# Patient Record
Sex: FEMALE | Race: WHITE | NOT HISPANIC OR LATINO | Employment: FULL TIME | URBAN - NONMETROPOLITAN AREA
[De-identification: names, ages, dates, MRNs, and addresses within clinical notes are randomized per-mention and may not be internally consistent; named-entity substitution may affect disease eponyms.]

---

## 2018-04-03 ENCOUNTER — INITIAL PRENATAL (OUTPATIENT)
Dept: OBSTETRICS AND GYNECOLOGY | Facility: CLINIC | Age: 28
End: 2018-04-03

## 2018-04-03 ENCOUNTER — APPOINTMENT (OUTPATIENT)
Dept: LAB | Facility: HOSPITAL | Age: 28
End: 2018-04-03

## 2018-04-03 VITALS
BODY MASS INDEX: 31.98 KG/M2 | HEIGHT: 68 IN | WEIGHT: 211 LBS | SYSTOLIC BLOOD PRESSURE: 100 MMHG | DIASTOLIC BLOOD PRESSURE: 72 MMHG

## 2018-04-03 DIAGNOSIS — O34.219 MATERNAL CARE FOR SCAR FROM PREVIOUS CESAREAN DELIVERY, UNSPECIFIED PRIOR CESAREAN DELIVERY TYPE: Primary | ICD-10-CM

## 2018-04-03 DIAGNOSIS — Z3A.00 WEEKS OF GESTATION OF PREGNANCY NOT SPECIFIED: ICD-10-CM

## 2018-04-03 LAB
ABO GROUP BLD: NORMAL
AMPHET+METHAMPHET UR QL: NEGATIVE
BARBITURATES UR QL SCN: NEGATIVE
BASOPHILS # BLD AUTO: 0.02 10*3/MM3 (ref 0–0.2)
BASOPHILS NFR BLD AUTO: 0.2 % (ref 0–2)
BENZODIAZ UR QL SCN: NEGATIVE
BILIRUB UR QL STRIP: NEGATIVE
BLD GP AB SCN SERPL QL: NEGATIVE
CANDIDA ALBICANS: NEGATIVE
CANNABINOIDS SERPL QL: NEGATIVE
CLARITY UR: ABNORMAL
COCAINE UR QL: NEGATIVE
COLOR UR: YELLOW
DEPRECATED RDW RBC AUTO: 47.3 FL (ref 36.4–46.3)
EOSINOPHIL # BLD AUTO: 0.1 10*3/MM3 (ref 0–0.7)
EOSINOPHIL NFR BLD AUTO: 0.8 % (ref 0–7)
ERYTHROCYTE [DISTWIDTH] IN BLOOD BY AUTOMATED COUNT: 14.5 % (ref 11.5–14.5)
GARDNERELLA VAGINALIS: POSITIVE
GLUCOSE UR STRIP-MCNC: NEGATIVE MG/DL
HCT VFR BLD AUTO: 36.6 % (ref 35–45)
HGB BLD-MCNC: 12.6 G/DL (ref 12–15.5)
HGB UR QL STRIP.AUTO: NEGATIVE
IMM GRANULOCYTES # BLD: 0.04 10*3/MM3 (ref 0–0.02)
IMM GRANULOCYTES NFR BLD: 0.3 % (ref 0–0.5)
KETONES UR QL STRIP: NEGATIVE
LEUKOCYTE ESTERASE UR QL STRIP.AUTO: ABNORMAL
LYMPHOCYTES # BLD AUTO: 3.08 10*3/MM3 (ref 0.6–4.2)
LYMPHOCYTES NFR BLD AUTO: 26.1 % (ref 10–50)
Lab: NORMAL
MCH RBC QN AUTO: 30.5 PG (ref 26.5–34)
MCHC RBC AUTO-ENTMCNC: 34.4 G/DL (ref 31.4–36)
MCV RBC AUTO: 88.6 FL (ref 80–98)
METHADONE UR QL SCN: NEGATIVE
MONOCYTES # BLD AUTO: 0.77 10*3/MM3 (ref 0–0.9)
MONOCYTES NFR BLD AUTO: 6.5 % (ref 0–12)
NEUTROPHILS # BLD AUTO: 7.77 10*3/MM3 (ref 2–8.6)
NEUTROPHILS NFR BLD AUTO: 66.1 % (ref 37–80)
NITRITE UR QL STRIP: NEGATIVE
OPIATES UR QL: NEGATIVE
OXYCODONE UR QL SCN: NEGATIVE
PH UR STRIP.AUTO: 6 [PH] (ref 5–9)
PLATELET # BLD AUTO: 295 10*3/MM3 (ref 150–450)
PMV BLD AUTO: 11.4 FL (ref 8–12)
PROT UR QL STRIP: NEGATIVE
RBC # BLD AUTO: 4.13 10*6/MM3 (ref 3.77–5.16)
RH BLD: POSITIVE
SP GR UR STRIP: 1.02 (ref 1–1.03)
TRICHOMONAS VAGINALIS PCR: NEGATIVE
UROBILINOGEN UR QL STRIP: ABNORMAL
WBC NRBC COR # BLD: 11.78 10*3/MM3 (ref 3.2–9.8)

## 2018-04-03 PROCEDURE — 82677 ASSAY OF ESTRIOL: CPT | Performed by: OBSTETRICS & GYNECOLOGY

## 2018-04-03 PROCEDURE — 86336 INHIBIN A: CPT | Performed by: OBSTETRICS & GYNECOLOGY

## 2018-04-03 PROCEDURE — 86592 SYPHILIS TEST NON-TREP QUAL: CPT | Performed by: OBSTETRICS & GYNECOLOGY

## 2018-04-03 PROCEDURE — 86901 BLOOD TYPING SEROLOGIC RH(D): CPT | Performed by: OBSTETRICS & GYNECOLOGY

## 2018-04-03 PROCEDURE — 99202 OFFICE O/P NEW SF 15 MIN: CPT | Performed by: OBSTETRICS & GYNECOLOGY

## 2018-04-03 PROCEDURE — 80307 DRUG TEST PRSMV CHEM ANLYZR: CPT | Performed by: OBSTETRICS & GYNECOLOGY

## 2018-04-03 PROCEDURE — 86900 BLOOD TYPING SEROLOGIC ABO: CPT | Performed by: OBSTETRICS & GYNECOLOGY

## 2018-04-03 PROCEDURE — 81003 URINALYSIS AUTO W/O SCOPE: CPT | Performed by: OBSTETRICS & GYNECOLOGY

## 2018-04-03 PROCEDURE — 87086 URINE CULTURE/COLONY COUNT: CPT | Performed by: OBSTETRICS & GYNECOLOGY

## 2018-04-03 PROCEDURE — 85025 COMPLETE CBC W/AUTO DIFF WBC: CPT | Performed by: OBSTETRICS & GYNECOLOGY

## 2018-04-03 PROCEDURE — 87186 SC STD MICRODIL/AGAR DIL: CPT | Performed by: OBSTETRICS & GYNECOLOGY

## 2018-04-03 PROCEDURE — 87491 CHLMYD TRACH DNA AMP PROBE: CPT | Performed by: OBSTETRICS & GYNECOLOGY

## 2018-04-03 PROCEDURE — 82105 ALPHA-FETOPROTEIN SERUM: CPT | Performed by: OBSTETRICS & GYNECOLOGY

## 2018-04-03 PROCEDURE — 86850 RBC ANTIBODY SCREEN: CPT | Performed by: OBSTETRICS & GYNECOLOGY

## 2018-04-03 PROCEDURE — 87591 N.GONORRHOEAE DNA AMP PROB: CPT | Performed by: OBSTETRICS & GYNECOLOGY

## 2018-04-03 PROCEDURE — 87340 HEPATITIS B SURFACE AG IA: CPT | Performed by: OBSTETRICS & GYNECOLOGY

## 2018-04-03 PROCEDURE — 87077 CULTURE AEROBIC IDENTIFY: CPT | Performed by: OBSTETRICS & GYNECOLOGY

## 2018-04-03 PROCEDURE — 36415 COLL VENOUS BLD VENIPUNCTURE: CPT | Performed by: OBSTETRICS & GYNECOLOGY

## 2018-04-03 PROCEDURE — 84702 CHORIONIC GONADOTROPIN TEST: CPT | Performed by: OBSTETRICS & GYNECOLOGY

## 2018-04-03 PROCEDURE — G0123 SCREEN CERV/VAG THIN LAYER: HCPCS | Performed by: OBSTETRICS & GYNECOLOGY

## 2018-04-03 PROCEDURE — 87661 TRICHOMONAS VAGINALIS AMPLIF: CPT | Performed by: OBSTETRICS & GYNECOLOGY

## 2018-04-03 PROCEDURE — 87660 TRICHOMONAS VAGIN DIR PROBE: CPT | Performed by: OBSTETRICS & GYNECOLOGY

## 2018-04-03 PROCEDURE — 87480 CANDIDA DNA DIR PROBE: CPT | Performed by: OBSTETRICS & GYNECOLOGY

## 2018-04-03 PROCEDURE — 86762 RUBELLA ANTIBODY: CPT | Performed by: OBSTETRICS & GYNECOLOGY

## 2018-04-03 PROCEDURE — 86803 HEPATITIS C AB TEST: CPT | Performed by: OBSTETRICS & GYNECOLOGY

## 2018-04-03 PROCEDURE — G0432 EIA HIV-1/HIV-2 SCREEN: HCPCS | Performed by: OBSTETRICS & GYNECOLOGY

## 2018-04-03 PROCEDURE — 87510 GARDNER VAG DNA DIR PROBE: CPT | Performed by: OBSTETRICS & GYNECOLOGY

## 2018-04-03 RX ORDER — ONDANSETRON 8 MG/1
8 TABLET, ORALLY DISINTEGRATING ORAL EVERY 8 HOURS PRN
COMMUNITY
End: 2022-12-01

## 2018-04-03 RX ORDER — PRENATAL VIT/IRON FUM/FOLIC AC 27MG-0.8MG
TABLET ORAL DAILY
COMMUNITY
End: 2022-12-01

## 2018-04-03 RX ORDER — OMEPRAZOLE 20 MG/1
20 CAPSULE, DELAYED RELEASE ORAL DAILY
COMMUNITY
End: 2022-12-01

## 2018-04-03 NOTE — PROGRESS NOTES
Chief Complaint   Patient presents with   • Initial Prenatal Visit       Kath Uribe is a 27 y.o. year old .  Patient's last menstrual period was 2017 (within days).  She presents to be seen to initiate prenatal care.    Smoking status: Current Every Day Smoker                                                   Packs/day: 0.00      Years: 0.00         Types: Electronic Cigarette  Smokeless tobacco: Never Used                          The following portions of the patient's history were reviewed and updated as appropriate:vital signs, allergies, current medications, past medical history, past social history, past surgical history and problem list.    Lab Review   No data reviewed    Imaging   No data reviewed    Assessment/Plan   ASSESSMENT  1. IUP at Unknown  Kath was seen today for initial prenatal visit.    Diagnoses and all orders for this visit:    Maternal care for scar from previous  delivery, unspecified prior  delivery type  -     US Ob 14 + Weeks Single or First Gestation; Future  -     OB Panel With HIV  -     Urine Drug Screen - Urine, Clean Catch  -     Urine Culture - Urine, Urine, Clean Catch  -     Urinalysis - Urine, Clean Catch  -     Gardnerella vaginalis, Trichomonas vaginalis, Candida albicans, PCR - Swab, Vagina  -     Chlamydia trachomatis, Neisseria gonorrhoeae, Trichomonas vaginalis, PCR - Swab, Vagina  -     Liquid-based Pap Smear, Screening  -     Maternal Screen 4  -     RPR  -     CBC Auto Differential  -     Hepatitis B Surface Antigen  -     Rubella Antibody, IgG  -     OB Panel Type & Screen  -     HIV-1 & HIV-2 Antibodies  -     Hepatitis C Antibody    Weeks of gestation of pregnancy not specified    2.     PLAN  1. Tests ordered today:  Orders Placed This Encounter   Procedures   • Urine Culture - Urine, Urine, Clean Catch   • Gardnerella vaginalis, Trichomonas vaginalis, Candida albicans, PCR - Swab, Vagina   • Chlamydia trachomatis, Neisseria  gonorrhoeae, Trichomonas vaginalis, PCR - Swab, Vagina   • US Ob 14 + Weeks Single or First Gestation     Standing Status:   Future     Standing Expiration Date:   4/3/2019     Order Specific Question:   Reason for Exam:     Answer:   Anatomy z36   • OB Panel With HIV   • Urine Drug Screen - Urine, Clean Catch   • Urinalysis - Urine, Clean Catch   • Maternal Screen 4     , edc 9/4/18     Order Specific Question:   [facility] Is egg from donor?     Answer:   No     Order Specific Question:   [facility] Date the gestational age was calculated:     Answer:   4/3/2018     Order Specific Question:   [facility] Gestational age of fetus (weeks):     Answer:   18     Order Specific Question:   [facility] Is patient insulin-dependent     Answer:   No     Order Specific Question:   [facility] Number of fetuses:     Answer:   1     Order Specific Question:   [facility] Reason for screen:     Answer:   OTHER     Order Specific Question:   [facility] Gestational age calculation method:     Answer:   ULTRASOUND     Order Specific Question:   [facility] Patient weight (lbs):     Answer:   211   • RPR   • CBC Auto Differential   • Hepatitis B Surface Antigen   • Rubella Antibody, IgG   • HIV-1 & HIV-2 Antibodies   • Hepatitis C Antibody   • OB Panel Type & Screen     2. Medications prescribed today:  New Medications Ordered This Visit   Medications   • omeprazole (priLOSEC) 20 MG capsule     Sig: Take 20 mg by mouth Daily.   • Prenatal Vit-Fe Fumarate-FA (PRENATAL VITAMIN 27-0.8) 27-0.8 MG tablet tablet     Sig: Take  by mouth Daily.   • ondansetron 24 mg in sodium chloride 0.9 % 50 mL IVPB     Sig: Infuse 24 mg into a venous catheter 1 (One) Time.   • ondansetron ODT (ZOFRAN-ODT) 8 MG disintegrating tablet     Sig: Take 8 mg by mouth Every 8 (Eight) Hours As Needed for Nausea or Vomiting.       Follow up: 2 week(s)       This note was electronically signed.    Adriano Long MD  April 3, 2018

## 2018-04-04 LAB
C TRACH RRNA CVX QL NAA+PROBE: NEGATIVE
HBV SURFACE AG SERPL QL IA: NEGATIVE
HCV AB SER DONR QL: NEGATIVE
HIV1+2 AB SER QL: NEGATIVE
N GONORRHOEA RRNA SPEC QL NAA+PROBE: NEGATIVE
RUBV IGG SER QL: ABNORMAL
RUBV IGG SER-ACNC: 23 IU/ML (ref 0–9.9)
T VAGINALIS DNA VAG QL PROBE+SIG AMP: NEGATIVE

## 2018-04-05 LAB
2ND TRIMESTER 4 SCREEN SERPL-IMP: NORMAL
2ND TRIMESTER 4 SCREEN SERPL-IMP: NORMAL
AFP ADJ MOM SERPL: 0.72
AFP SERPL-MCNC: 22.1 NG/ML
AGE AT DELIVERY: 26.2 YEARS
BACTERIA SPEC AEROBE CULT: ABNORMAL
BACTERIA SPEC AEROBE CULT: ABNORMAL
FET TS 18 RISK FROM MAT AGE: NORMAL
FET TS 21 RISK FROM MAT AGE: 971
GA METHOD: NORMAL
GA: 17.1 WEEKS
HCG ADJ MOM SERPL: 1.27
HCG SERPL-ACNC: NORMAL MIU/ML
IDDM PATIENT QL: NO
INHIBIN A ADJ MOM SERPL: 1.16
INHIBIN A SERPL-MCNC: 157.36 PG/ML
LAB AP CASE REPORT: NORMAL
LAB AP GYN ADDITIONAL INFORMATION: NORMAL
LAB AP GYN OTHER FINDINGS: NORMAL
LABORATORY COMMENT REPORT: NORMAL
Lab: NORMAL
MULTIPLE PREGNANCY: NO
NEURAL TUBE DEFECT RISK FETUS: NORMAL %
PATH INTERP SPEC-IMP: NORMAL
RESULT: NORMAL
RPR SER QL: NORMAL
STAT OF ADQ CVX/VAG CYTO-IMP: NORMAL
TS 18 RISK FETUS: NORMAL
TS 21 RISK FETUS: 2405
U ESTRIOL ADJ MOM SERPL: 1.55
U ESTRIOL SERPL-MCNC: 1.45 NG/ML

## 2022-10-20 ENCOUNTER — TRANSCRIBE ORDERS (OUTPATIENT)
Dept: PHYSICAL THERAPY | Facility: CLINIC | Age: 32
End: 2022-10-20

## 2022-10-20 DIAGNOSIS — M54.50 LOW BACK PAIN, UNSPECIFIED BACK PAIN LATERALITY, UNSPECIFIED CHRONICITY, UNSPECIFIED WHETHER SCIATICA PRESENT: Primary | ICD-10-CM

## 2022-10-24 ENCOUNTER — TREATMENT (OUTPATIENT)
Dept: PHYSICAL THERAPY | Facility: CLINIC | Age: 32
End: 2022-10-24

## 2022-10-24 DIAGNOSIS — M54.50 LEFT LUMBAR PAIN: Primary | ICD-10-CM

## 2022-10-24 PROCEDURE — 97162 PT EVAL MOD COMPLEX 30 MIN: CPT | Performed by: PHYSICAL THERAPIST

## 2022-10-24 PROCEDURE — 97140 MANUAL THERAPY 1/> REGIONS: CPT | Performed by: PHYSICAL THERAPIST

## 2022-10-24 NOTE — PROGRESS NOTES
"                                                        Physical Therapy Initial Evaluation and Plan of Care  [address] [place] [address]    Patient: Kath Uribe               : 1990  Visit Date: 10/24/2022  Referring practitioner: YANN Arias  Date of Initial Visit: 10/24/2022  Patient seen for 1 sessions    Visit Diagnoses:    ICD-10-CM ICD-9-CM   1. Left lumbar pain  M54.50 724.2     Past Medical History:   Diagnosis Date   • Anxiety    • Depression    • History of postpartum depression, currently pregnant    • Kidney stone    • Urinary tract infection      Past Surgical History:   Procedure Laterality Date   •  SECTION      X2   • EAR TUBES           SUBJECTIVE     Subjective Evaluation    History of Present Illness  Date of onset: 2022  Mechanism of injury: She was loading boxes (30-60#) \"by myself). She says the first time she felt pain, it wasn't has bad the first strain. Almost 1-2 weeks later, she was picking up 30-60# boxes and felt a worse strain. She has worked there for 1.5 months. She hadn't \"worked strenuous for a long time\" prior to this job.       Patient Occupation: [employer]  Current pain ratin  At best pain ratin  At worst pain ratin  Location: left lumbar  Quality: burning (swollen, strained)  Relieving factors: rest and support  Exacerbated by: bending/twisting.  Progression: no change    Social Support  Lives with: friend.    Diagnostic Tests  X-ray: normal    Treatments  Current treatment: medication  Patient Goals  Patient goals for therapy: decreased pain, increased motion, independence with ADLs/IADLs, return to sport/leisure activities and return to work         Outcome Measure:   JONATHON:        OBJECTIVE     Objective          Palpation   Left   Hypertonic in the lumbar paraspinals and piriformis.   Muscle spasm in the lumbar paraspinals.   Tenderness of the lumbar paraspinals and piriformis.     Tenderness     Lumbar " "Spine  Tenderness in the facet joint (tender left L5 and left T12).     Left Hip   Tenderness in the sacroiliac joint.     Active Range of Motion     Lumbar   Flexion: Active lumbar flexion: 50% with pain  Extension: Active lumbar extension: 75% with pain    Additional Active Range of Motion Details  Flexion caused more \"pulling in left lumbar\". Extension in standing caused more left mid thoracic pain.     Strength/Myotome Testing     Left Hip   Planes of Motion   Flexion: WFL    Right Hip   Planes of Motion   Flexion: WFL    Left Knee   Flexion: WFL  Extension: WFL    Right Knee   Flexion: WFL  Extension: WFL    Left Ankle/Foot   Dorsiflexion: WFL.   Plantar flexion: WFL.     Right Ankle/Foot   Dorsiflexion: WFL.   Plantar flexion: WFL.     Functional Assessment     Single Leg Stance   Left: 10 seconds  Right: 10 seconds      Manual Therapy     [id number]  Comments   Percussive IASTM using Powerboost to left thoracolumbar paraspinals and left piriformis at L3 using Y attachment      Left hip flexor stretch off edge of mat Modified Jt Test position; low load prolonged stretch               Timed Minutes 15            Therapy Education/Self Care 84293   Education offered today HEP below.   Educated on anatomy of her spine and origin of her pain   [employer] Code    Ongoing HEP   Access Code: [email or web address]  URL: [email or web address]  Date: 10/24/2022  Prepared by: Darrel Koenig    Exercises  Cat Cow - 2 x daily - 7 x weekly - 1 sets - 10 reps  Child's Pose Stretch - 5 x daily - 7 x weekly - 2 sets - 30 hold  Child's Pose with Sidebending - 5 x daily - 7 x weekly - 2 sets - 30 hold  Quadruped Full Range Thoracic Rotation with Reach - 5 x daily - 7 x weekly - 1 sets - 10 reps     Timed Minutes        Total Timed Treatment:     15   mins  Total Time of Visit:            45   mins    ASSESSMENT/PLAN     GOALS:  Goals                                                  Progress Note due by 11/23/22                             "                                  Recert due by 1/21/23   STG by: 3 weeks Comments Date Status   Improve christine thoracolumbar mobility       Improve muscle relaxation of left TL paraspinals       Left hip flexion to passive full ROM              LTG by: 6 weeks       Able to forward bend to touch toes without exacerbation of pain       Reports pain no greater than 1-2/10 when it does occur.       Improve Modified Oswestry to 5 or less      Independent with HEP for flexibility and core/hip stability.           Assessment & Plan     Assessment  Impairments: abnormal muscle firing, abnormal muscle tone, abnormal or restricted ROM, activity intolerance, impaired physical strength, lacks appropriate home exercise program and pain with function  Functional Limitations: lifting, walking, uncomfortable because of pain, sitting, standing, stooping and unable to perform repetitive tasks  Assessment details: Her symptoms appears consistent with a stuck facet in her left lower lumbar as well as her left lower thoracic with secondary muscle guarding. I think if we can get the muscles to relax and let go of the facets, she can get relief quickly.   Prognosis: good    Plan  Therapy options: will be seen for skilled therapy services  Planned modality interventions: dry needling, low level laser therapy, TENS and traction  Planned therapy interventions: abdominal trunk stabilization, flexibility, functional ROM exercises, home exercise program, joint mobilization, manual therapy, motor coordination training, neuromuscular re-education, postural training, soft tissue mobilization, spinal/joint mobilization, strengthening, stretching and therapeutic activities  Frequency: 2-3x/week.  Duration in weeks: 6  Treatment plan discussed with: patient  Plan details: Focus early on pain relief with soft tissue work and modalities as needed. Work on  mobility and flexibility through the spine and hips. Progress with postural/core/hip stability to  improve postural alignment and mechanics.Progress the HEP for the same.        SIGNATURE: Darrel Koenig PT, [place] License #: [id number]  Electronically Signed on 10/24/2022      Initial Certification  Certification Period: 10/24/2022 through 1/21/2023  I certify that the therapy services are furnished while this patient is under my care.  The services outlined above are required by this patient, and will be reviewed every 90 days.     PHYSICIAN: Travis Casillas APRN (NPI: [id number])    Signature____________________________________________DATE: _________     Please sign and return via fax to [phone].   Thank you so much for letting us work with Kath. I appreciate your letting us work with your patients. If you have any questions or concerns, please don't hesitate to contact me.          [address] [address]  [phone]

## 2022-11-04 ENCOUNTER — TREATMENT (OUTPATIENT)
Dept: PHYSICAL THERAPY | Facility: CLINIC | Age: 32
End: 2022-11-04

## 2022-11-04 DIAGNOSIS — M54.50 LEFT LUMBAR PAIN: Primary | ICD-10-CM

## 2022-11-04 PROCEDURE — 97140 MANUAL THERAPY 1/> REGIONS: CPT | Performed by: PHYSICAL THERAPIST

## 2022-11-04 PROCEDURE — 97110 THERAPEUTIC EXERCISES: CPT | Performed by: PHYSICAL THERAPIST

## 2022-11-04 PROCEDURE — 97014 ELECTRIC STIMULATION THERAPY: CPT | Performed by: PHYSICAL THERAPIST

## 2022-11-04 NOTE — PROGRESS NOTES
Physical Therapy Treatment Note  [address] [place] [address]    Patient: Kath Uribe                                                 Visit Date: 2022  :     1990    Referring practitioner:    YANN Arias  Date of Initial Visit:          Type: THERAPY  Noted: 10/24/2022    Patient seen for 2 sessions    Visit Diagnoses:    ICD-10-CM ICD-9-CM   1. Left lumbar pain  M54.50 724.2     SUBJECTIVE     Subjective She reports that TENS has helped with her pain previously. She tolerated L SL best.     PAIN: 4-5/10 > 2/10     OBJECTIVE     Objective      Manual Therapy     02785  Comments   IASTM with pink foam roller, R SL L gluteals, lower lumbar paraspinals, prox ITB   IASTM with double lacrosse ball, R SL Deeper pressure to L gluteals and lower lumbar paraspinals   Thoracic ext mobilizations, prone Pain >gr 2           Timed Minutes 20     Therapeutic Exercises    30728 Units Comments   HL thoracic/pec stretch over towel roll 2 min    HL alternating dead bugs 2 x 10  Ball removed d/t difficulty with coordination                  Timed Minutes 10      Modalities Comments   TENS (IFC) L SL with pillow between knees  MHP to lumbar spine  L lower lumbar/superior gluteals  maxA 9/10       Minutes 10      Therapy Education/Self Care 20978   Education offered today Can purchase home TENS unit   [facility]  [email or web address]   Ongoing HEP   Date: 10/24/2022  Prepared by: Darrel Koenig     Exercises  Cat Cow - 2 x daily - 7 x weekly - 1 sets - 10 reps  Child's Pose Stretch - 5 x daily - 7 x weekly - 2 sets - 30 hold  Child's Pose with Sidebending - 5 x daily - 7 x weekly - 2 sets - 30 hold  Quadruped Full Range Thoracic Rotation with Reach - 5 x daily - 7 x weekly - 1 sets - 10 reps   Timed Minutes       Total Timed Treatment:     30   mins  Total Time of Visit:             40   mins         ASSESSMENT/PLAN     GOALS  Goals                                                   Progress Note due by 11/23/22                                                              Recert due by 1/21/23   STG by: 3 weeks Comments Date Status   Improve christine thoracolumbar mobility  hypomobile throughout thoracic spine, TTP >grade 2 11/4 ongoing   Improve muscle relaxation of left TL paraspinals     ongoing   Left hip flexion to passive full ROM     ongoing   LTG by: 6 weeks         Able to forward bend to touch toes without exacerbation of pain     ongoing   Reports pain no greater than 1-2/10 when it does occur.     ongoing   Improve Modified Oswestry to 5 or less  14/50 on 10/24/2022 10/24 ongoing   Independent with HEP for flexibility and core/hip stability.    ongoing     Assessment/Plan     ASSESSMENT: No goals have been met at this time; however, this was her first tx since her initial evaluation. Patient tolerated exercises, but responded especially well to TENS. She demonstrated some difficulty with coordination during exercises, though did not report pain. She demonstrated increased TTP with >grade 2 during thoracic mobilizations, with best tolerance to indirect pressure over thoracic spine.     PLAN: Assess response to tx today and progress with TENS as needed, though consider addressing spinal and hip mobility as tolerated. If she does purchase home TENS unit and brings it with her, consider educating her on how to utilize it properly. Consider continued hip and core strengthening as tolerated.     SIGNATURE: Alta Zuniga PTA, [place] License #: [id number]  Electronically Signed on 11/4/2022        [place] [address]  [phone]

## 2022-11-11 ENCOUNTER — TREATMENT (OUTPATIENT)
Dept: PHYSICAL THERAPY | Facility: CLINIC | Age: 32
End: 2022-11-11

## 2022-11-11 DIAGNOSIS — M54.50 LEFT LUMBAR PAIN: Primary | ICD-10-CM

## 2022-11-11 PROCEDURE — 97140 MANUAL THERAPY 1/> REGIONS: CPT | Performed by: PHYSICAL THERAPIST

## 2022-11-11 PROCEDURE — 97032 APPL MODALITY 1+ESTIM EA 15: CPT | Performed by: PHYSICAL THERAPIST

## 2022-11-11 NOTE — PROGRESS NOTES
Physical Therapy Treatment Note  [address] [place] [address]    Patient: Kaht Uribe                                                 Visit Date: 2022  :     1990    Referring practitioner:    YANN Arias  Date of Initial Visit:          Type: THERAPY  Noted: 10/24/2022    Patient seen for 3 sessions    Visit Diagnoses:    ICD-10-CM ICD-9-CM   1. Left lumbar pain  M54.50 724.2     SUBJECTIVE     Subjective She reports her back has been o.k. she got a new bed and that effected her back     PAIN: 4/10         OBJECTIVE     Objective      Modalities Comments   Combo cold laser e stem chronic inflammation mode L3-L5 region L SL    Tx 1    Minutes 10     Manual Therapy     84300  Comments   STM B lower lumbar region  L SL   Figure 8 lumbar traction Grade 1 sustained HL               Timed Minutes 35          Therapy Education/Self Care 79403   Education offered today    [employer] Code    Ongoing HEP   Date: 10/24/2022  Prepared by: Darrel Koenig     Exercises  Cat Cow - 2 x daily - 7 x weekly - 1 sets - 10 reps  Child's Pose Stretch - 5 x daily - 7 x weekly - 2 sets - 30 hold  Child's Pose with Sidebending - 5 x daily - 7 x weekly - 2 sets - 30 hold  Quadruped Full Range Thoracic Rotation with Reach - 5 x daily - 7 x weekly - 1 sets - 10 reps   Timed Minutes        Total Timed Treatment:     45   mins  Total Time of Visit:             45   mins         ASSESSMENT/PLAN     GOALS  Goals                                                  Progress Note due by 22                                                              Recert due by 23   STG by: 3 weeks Comments Date Status   Improve christine thoracolumbar mobility  hypomobile throughout thoracic spine, TTP >grade 2  ongoing   Improve muscle relaxation of left TL paraspinals     ongoing   Left hip flexion to passive full ROM  Measured  PROM 102* degrees   ongoing    LTG by: 6 weeks         Able to forward bend to touch toes without exacerbation of pain     ongoing   Reports pain no greater than 1-2/10 when it does occur.     ongoing   Improve Modified Oswestry to 5 or less  14/50 on 10/24/2022 10/24 ongoing   Independent with HEP for flexibility and core/hip stability.     ongoing         Assessment/Plan     ASSESSMENT: Today I opted to utilize combo code laser e stem to combine the benefits of both e stem and inflammation reduction. There was a moderate amount of soft tissue restricitions in her L lower lumbar region and less in comparison on the R today.     PLAN: Continue a conservative progression and consider utilizing combo code laser e stem    SIGNATURE: Aram Lara, [facility], [place] License #: [id number]  Electronically Signed on 11/11/2022        [address] [place] [address]  [phone]

## 2022-11-17 ENCOUNTER — TREATMENT (OUTPATIENT)
Dept: PHYSICAL THERAPY | Facility: CLINIC | Age: 32
End: 2022-11-17

## 2022-11-17 DIAGNOSIS — M54.50 LEFT LUMBAR PAIN: Primary | ICD-10-CM

## 2022-11-17 PROCEDURE — 97140 MANUAL THERAPY 1/> REGIONS: CPT | Performed by: PHYSICAL THERAPIST

## 2022-11-17 NOTE — PROGRESS NOTES
Physical Therapy Treatment Note  [address] [place] [address]    Patient: Kath Uribe                                                 Visit Date: 2022  :     1990    Referring practitioner:    YANN Arias  Date of Initial Visit:          Type: THERAPY  Noted: 10/24/2022    Patient seen for 4 sessions    Visit Diagnoses:    ICD-10-CM ICD-9-CM   1. Left lumbar pain  M54.50 724.2     SUBJECTIVE     Subjective She reports she is the same after last session. She reports her back is a little sore from day to day stuff    PAIN: 4/10         OBJECTIVE     Objective      Modalities Comments   Combo cold laser e stem chronic inflammation mode L3-L5 region L SL     Tx 2    Minutes 10     Manual Therapy     48446  Comments   STM B lower lumbar region  L SL   Figure 8 lumbar traction Grade 1 sustained HL    B hip inferior lateral glides Grade 2 followed by ER stretches  HL               Timed Minutes 35           Therapy Education/Self Care 28076   Education offered today    [employer] Code    Ongoing HEP   Date: 10/24/2022  Prepared by: Darrel Koenig     Exercises  Cat Cow - 2 x daily - 7 x weekly - 1 sets - 10 reps  Child's Pose Stretch - 5 x daily - 7 x weekly - 2 sets - 30 hold  Child's Pose with Sidebending - 5 x daily - 7 x weekly - 2 sets - 30 hold  Quadruped Full Range Thoracic Rotation with Reach - 5 x daily - 7 x weekly - 1 sets - 10 reps   Timed Minutes        Total Timed Treatment:     45   mins  Total Time of Visit:             45   mins         ASSESSMENT/PLAN     GOALS  Goals                                                  Progress Note due by 22                                                              Recert due by 23   STG by: 3 weeks Comments Date Status   Improve christine thoracolumbar mobility  hypomobile throughout thoracic spine, TTP >grade 2  ongoing   Improve muscle relaxation of left TL  paraspinals     ongoing   Left hip flexion to passive full ROM  Measured  PROM 102* degrees  11/11 ongoing   LTG by: 6 weeks         Able to forward bend to touch toes without exacerbation of pain     ongoing   Reports pain no greater than 1-2/10 when it does occur.  4/10  11/17 ongoing   Improve Modified Oswestry to 5 or less  14/50 on 10/24/2022 10/24 ongoing   Independent with HEP for flexibility and core/hip stability.     ongoing         Assessment/Plan     ASSESSMENT: She had reduced soft tissue restrictions in her B lower lumbar today as compared to the last session. We repeated the interventions performed during the previous session and added B hip ER mobilizations and stretches.    PLAN: Continue a conservative progression and consider continuing utilizing combo cold laser E stem    SIGNATURE: Aram Lara, [facility], [place] License #: [id number]  Electronically Signed on 11/17/2022        [address] [place] [address]. [id number]  [phone]

## 2022-11-22 ENCOUNTER — TREATMENT (OUTPATIENT)
Dept: PHYSICAL THERAPY | Facility: CLINIC | Age: 32
End: 2022-11-22

## 2022-11-22 DIAGNOSIS — M54.50 LEFT LUMBAR PAIN: Primary | ICD-10-CM

## 2022-11-22 PROCEDURE — 97140 MANUAL THERAPY 1/> REGIONS: CPT | Performed by: PHYSICAL THERAPIST

## 2022-11-22 PROCEDURE — 97110 THERAPEUTIC EXERCISES: CPT | Performed by: PHYSICAL THERAPIST

## 2022-11-22 PROCEDURE — 97014 ELECTRIC STIMULATION THERAPY: CPT | Performed by: PHYSICAL THERAPIST

## 2022-11-22 NOTE — PROGRESS NOTES
"                                                                Physical Therapy Treatment Note and 30 Day Progress Note  [address] [place] [address]    Patient: Kath Uribe                                                 Visit Date: 2022  :     1990    Referring practitioner:    YANN Arias  Date of Initial Visit:          Type: THERAPY  Noted: 10/24/2022    Patient seen for 5 sessions    Visit Diagnoses:    ICD-10-CM ICD-9-CM   1. Left lumbar pain  M54.50 724.2     SUBJECTIVE     Subjective She reports she is the same after last session. She reports her back is a little sore from day to day stuff. She had increased pain getting OOB today.     PAIN: 4/10       OBJECTIVE     Objective      Manual Therapy     20329  Comments   STM B lower lumbar region  L SL   Prone manual LS distraction    Prone christine sacral JORDYN PA mobs Gr 3 repetitive   Prone thoracic ext mobs Gr 3 repetitive         Timed Minutes 25     Therapeutic Exercises    92390 Units Comments   Prone heel press/GS 10\" x 5    birddog 10\" x5 each    Bridges sustained 10\" x 5 each         Sidelying hip abd SLR 10\" x 5    UBE christine UE     Timed Minutes 25       Modalities Comments   IFC estim unattd christine lower lumbar/upper glutes Prone   (Alta Efrain, PTA applied for me)    MH christine lumbar    Minutes 15       Therapy Education/Self Care 74212   Education offered today Progressed HEP to include more core stability   [employer] Code [email or web address] (she has downloaded Doce[employer] juan)   Ongoing HEP     Date: 2022  Prepared by: Darrel Koenig    Exercises  Cat Cow - 2 x daily - 7 x weekly - 1 sets - 10 reps  Child's Pose Stretch - 5 x daily - 7 x weekly - 2 sets - 30 hold  Child's Pose with Sidebending - 5 x daily - 7 x weekly - 2 sets - 30 hold  Quadruped Full Range Thoracic Rotation with Reach - 5 x daily - 7 x weekly - 1 sets - 10 reps  Bird Dog - 2 x daily - 7 x weekly - 2 sets - 10 reps  Supine Piriformis Stretch with Foot on Ground - 2 " x daily - 7 x weekly - 2 sets - 30 hold  Supine Bridge - 2 x daily - 7 x weekly - 2 sets - 10 reps  Sidelying Hip Abduction - 2 x daily - 7 x weekly - 2 sets - 10 reps     Timed Minutes        Total Timed Treatment:     50   mins  Total Time of Visit:             65   mins         ASSESSMENT/PLAN     GOALS  Goals                                                  Progress Note due by 11/23/22                                                              Recert due by 1/21/23   STG by: 3 weeks Comments Date Status   Improve christine thoracolumbar mobility  hypomobile throughout thoracic spine, TTP >grade 2 11/22 ongoing   Improve muscle relaxation of left TL paraspinals     ongoing   Left hip flexion to passive full ROM  Measured  PROM 102* degrees  11/11 ongoing   LTG by: 6 weeks         Able to forward bend to touch toes without exacerbation of pain Observed forward bending to  bottle of coke off the ground with no hesitation in the lobby;   Full AROM also seen in the treatment room to reach ankles with increased pulling in lumbar 11/22 progressing   Reports pain no greater than 1-2/10 when it does occur.  5/10 this morning getting OOB; 4/10 now 11/22 ongoing   Improve Modified Oswestry to 5 or less  14/50 on 10/24/2022  15/50 on 11/22 11/22 ongoing   Independent with HEP for flexibility and core/hip stability.  progressed her HEP today to include more stability and movement 11/22 progressing         Assessment & Plan     Assessment  Impairments: abnormal muscle firing, abnormal muscle tone, abnormal or restricted ROM, activity intolerance, impaired physical strength, lacks appropriate home exercise program and pain with function  Functional Limitations: lifting, walking, uncomfortable because of pain, sitting, standing, stooping and unable to perform repetitive tasksPrognosis: good    Plan  Therapy options: will be seen for skilled therapy services  Planned modality interventions: dry needling, low level laser  "therapy, TENS and traction  Planned therapy interventions: abdominal trunk stabilization, flexibility, functional ROM exercises, home exercise program, joint mobilization, manual therapy, motor coordination training, neuromuscular re-education, postural training, soft tissue mobilization, spinal/joint mobilization, strengthening, stretching and therapeutic activities  Frequency: 2-3x/week.  Duration in weeks: 6  Treatment plan discussed with: patient         ASSESSMENT: She feels like her back isn't \"a whole lot different but it's only been 3 sessions\". There are some treatments like TENS that give her relief. Other things tend to irritate her pain. I increased her mobility and activity to train her nerve that movement is OK to not guard as heavily. She tolerated these new exercises well.     PLAN: Continue with emphasis on core stability and gentle flexibility.     SIGNATURE: Darrel Koenig, PT, [place] License #: [id number]  Electronically Signed on 11/22/2022        [facility] [place] [address]  [phone]  "

## 2022-12-01 PROCEDURE — U0003 INFECTIOUS AGENT DETECTION BY NUCLEIC ACID (DNA OR RNA); SEVERE ACUTE RESPIRATORY SYNDROME CORONAVIRUS 2 (SARS-COV-2) (CORONAVIRUS DISEASE [COVID-19]), AMPLIFIED PROBE TECHNIQUE, MAKING USE OF HIGH THROUGHPUT TECHNOLOGIES AS DESCRIBED BY CMS-2020-01-R: HCPCS | Performed by: NURSE PRACTITIONER

## 2022-12-02 ENCOUNTER — TREATMENT (OUTPATIENT)
Dept: PHYSICAL THERAPY | Facility: CLINIC | Age: 32
End: 2022-12-02

## 2022-12-02 DIAGNOSIS — M54.50 LEFT LUMBAR PAIN: Primary | ICD-10-CM

## 2022-12-02 PROCEDURE — 97110 THERAPEUTIC EXERCISES: CPT | Performed by: PHYSICAL THERAPIST

## 2022-12-02 PROCEDURE — 97112 NEUROMUSCULAR REEDUCATION: CPT | Performed by: PHYSICAL THERAPIST

## 2022-12-02 PROCEDURE — 97014 ELECTRIC STIMULATION THERAPY: CPT | Performed by: PHYSICAL THERAPIST

## 2022-12-02 NOTE — PROGRESS NOTES
Physical Therapy Treatment Note  [address] [place] [address]    Patient: Kath Uribe                                                 Visit Date: 2022  :     1990    Referring practitioner:    YANN Arias  Date of Initial Visit:          Type: THERAPY  Noted: 10/24/2022    Patient seen for 6 sessions    Visit Diagnoses:    ICD-10-CM ICD-9-CM   1. Left lumbar pain  M54.50 724.2     SUBJECTIVE     Subjective: He states her back pain isn't bad today. She says sitting/standing for long periods of time irritates her back so she has to get up and walk around frequently. She is recovering from a sinus infection     PAIN: 2-3/10 > unchanged   OBJECTIVE     Objective      Neuromuscular Reeducation     88351 Comments   LAQ sitting on yellow physioball 2 x 10    Marching sitting on yellow physioball 2 x 10    OH/chest press with 6lb weighted ball on yellow physioball  2 x 10    Timed Minutes 10     Therapeutic Exercises    45770 Units Comments   Bird dogs with opposite arm/leg extension 2 x 10     HL pushing down on yellow physioball 2 x 10     Bridging  2 x 10     Dead bug 2 x 10     Bridging on orange physioball while rolling ball back with LEs 2 x 10     Timed Minutes 25     Modalities Comments   TENS (IFC) -- christine lower lumbar/upper glutes Prone with use of moist heat to lumbar   Minutes 10     Therapy Education/Self Care 49465   Education offered today Importance of compliance with HEP, informed patient she could purchase at home TENS unit   [employer] Code [email or web address] (she has downloaded [employer] juan)   Ongoing HEP   Date: 2022  Prepared by: Darrel Koenig    Exercises  Cat Cow - 2 x daily - 7 x weekly - 1 sets - 10 reps  Child's Pose Stretch - 5 x daily - 7 x weekly - 2 sets - 30 hold  Child's Pose with Sidebending - 5 x daily - 7 x weekly - 2 sets - 30 hold  Quadruped Full Range Thoracic Rotation with Reach - 5 x daily - 7  x weekly - 1 sets - 10 reps  Bird Dog - 2 x daily - 7 x weekly - 2 sets - 10 reps  Supine Piriformis Stretch with Foot on Ground - 2 x daily - 7 x weekly - 2 sets - 30 hold  Supine Bridge - 2 x daily - 7 x weekly - 2 sets - 10 reps  Sidelying Hip Abduction - 2 x daily - 7 x weekly - 2 sets - 10 reps     Timed Minutes 3     Total Timed Treatment:     38   mins  Total Time of Visit:             48   mins         ASSESSMENT/PLAN        GOALS  Goals                                                  Progress Note due by 12/22/22                                                              Recert due by 1/21/23   STG by: 3 weeks Comments Date Status   Improve christine thoracolumbar mobility  hypomobile throughout thoracic spine, TTP >grade 2 11/22 ongoing   Improve muscle relaxation of left TL paraspinals    ongoing   Left hip flexion to passive full ROM  Measured  PROM 102* degrees 11/11 ongoing   LTG by: 6 weeks         Able to forward bend to touch toes without exacerbation of pain Unable to complete without increase in pain.  12/2 progressing   Reports pain no greater than 1-2/10 when it does occur. 5/10 this morning getting OOB; 4/10 now 11/22 ongoing   Improve Modified Oswestry to 5 or less  14/50 on 10/24/2022  15/50 on 11/22 11/22 ongoing   Independent with HEP for flexibility and core/hip stability. Having difficulty without bird dogs-- worked on during session today. She performed well, but has more difficulty with R arm/L leg extension. Instructed patient to continue practicing at home.  12/2 progressing     Assessment/Plan    ASSESSMENT: Focused today's treatment on core stabilization exercises. Patient is recovering from sinus infection, making it difficult to breath through her nose, so she required frequent breaks between exercises. Patient had no increase in pain during today's exercises, but had pain in L lumbar region when bending forward to attempt to touch toes. Patient stated her pain level seems unchanged,  but after use of TENS and heat, she says she feels less stiff. Patient reported difficulty with bird dogs given for HEP, she performed this during today's treatment and did well, but has more difficulty when extending R arm/L leg. Encouraged patient to continuing practicing this one at home since she does not experience pain and was able to perform correctly with observation.     PLAN: Continue with core stabilization exercises while increasing flexibility.     SIGNATURE: Alta Baptiste PTA Student   Electronically Signed on 12/2/2022     The clinical instructor and/or supervising staff, Alta Zuniga PTA, was present in clinic guiding the visit by approving, concurring, and confirming the skilled judgement for all services rendered.    Signature:  Alta Zuniga PTA, License/Certification#: [id number]  Electronically signed on 12/2/2022        [address] [place] [address] [place] [address]  [phone]

## 2022-12-06 ENCOUNTER — TREATMENT (OUTPATIENT)
Dept: PHYSICAL THERAPY | Facility: CLINIC | Age: 32
End: 2022-12-06

## 2022-12-06 DIAGNOSIS — M54.50 LEFT LUMBAR PAIN: Primary | ICD-10-CM

## 2022-12-06 PROCEDURE — 97110 THERAPEUTIC EXERCISES: CPT | Performed by: PHYSICAL THERAPIST

## 2022-12-06 NOTE — PROGRESS NOTES
Physical Therapy Treatment Note  [address] [place] [address]    Patient: Kath Uribe                                                 Visit Date: 2022  :     1990    Referring practitioner:    YANN Arias  Date of Initial Visit:          Type: THERAPY  Noted: 10/24/2022    Patient seen for 7 sessions    Visit Diagnoses:    ICD-10-CM ICD-9-CM   1. Left lumbar pain  M54.50 724.2     SUBJECTIVE     Subjective No big changes after the last session, her back is doing pretty good and not hurting right now    PAIN: 0/10         OBJECTIVE     Objective      Therapeutic Exercises    66166 Units Comments   B hip ER stretches with intermittent inferior lateral glides     Progressive thoracic stretch on 1/2 foam roller     HL marching on 1/2 foam roller x 15      B SL hip rainbows with #3 x 12 each     Windshield wipers with 45cm SB  x 15               Timed Minutes 40       Therapy Education/Self Care 39015   Education offered today    [facility] Code [email or web address]    Ongoing HEP   Date: 2022  Prepared by: Darrel Koenig     Exercises  Cat Cow - 2 x daily - 7 x weekly - 1 sets - 10 reps  Child's Pose Stretch - 5 x daily - 7 x weekly - 2 sets - 30 hold  Child's Pose with Sidebending - 5 x daily - 7 x weekly - 2 sets - 30 hold  Quadruped Full Range Thoracic Rotation with Reach - 5 x daily - 7 x weekly - 1 sets - 10 reps  Bird Dog - 2 x daily - 7 x weekly - 2 sets - 10 reps  Supine Piriformis Stretch with Foot on Ground - 2 x daily - 7 x weekly - 2 sets - 30 hold  Supine Bridge - 2 x daily - 7 x weekly - 2 sets - 10 reps  Sidelying Hip Abduction - 2 x daily - 7 x weekly - 2 sets - 10 reps      Timed Minutes        Total Timed Treatment:     40   mins  Total Time of Visit:             40   mins         ASSESSMENT/PLAN     GOALS  Goals                                                  Progress Note due by  12/22/22                                                              Recert due by 1/21/23   STG by: 3 weeks Comments Date Status   Improve christine thoracolumbar mobility  hypomobile throughout thoracic spine, TTP >grade 2 11/22 ongoing   Improve muscle relaxation of left TL paraspinals     ongoing   Left hip flexion to passive full ROM  Measured  PROM 102* degrees 11/11 ongoing   LTG by: 6 weeks         Able to forward bend to touch toes without exacerbation of pain Unable to complete without increase in pain.  12/2 progressing   Reports pain no greater than 1-2/10 when it does occur. 5/10 this morning getting OOB; 4/10 now 11/22 ongoing   Improve Modified Oswestry to 5 or less  14/50 on 10/24/2022  15/50 on 11/22 11/22 ongoing   Independent with HEP for flexibility and core/hip stability. Having difficulty without bird dogs-- worked on during session today. She performed well, but has more difficulty with R arm/L leg extension. Instructed patient to continue practicing at home.  12/2 progressing         Assessment/Plan     ASSESSMENT: Her sinus infection was improved today per her report. She was also pain free today which is promising and a sign we're heading in the right direction.    PLAN: Continue with core stabilization exercises while increasing flexibility.     SIGNATURE: Aram Lara, [facility], [place] License #: [id number]  Electronically Signed on 12/6/2022        [address] [place] [address]. [address]  [phone]

## 2022-12-08 ENCOUNTER — TELEPHONE (OUTPATIENT)
Dept: PHYSICAL THERAPY | Facility: CLINIC | Age: 32
End: 2022-12-08

## 2022-12-13 ENCOUNTER — TELEPHONE (OUTPATIENT)
Dept: PHYSICAL THERAPY | Facility: CLINIC | Age: 32
End: 2022-12-13

## 2022-12-13 NOTE — TELEPHONE ENCOUNTER
Caller: MARIELLE MCCARTHY     What was the call regarding: OVER SLEPT AND CAN NOT CATCH A BUS

## 2022-12-14 ENCOUNTER — OFFICE VISIT (OUTPATIENT)
Dept: FAMILY MEDICINE CLINIC | Age: 32
End: 2022-12-14
Payer: MEDICAID

## 2022-12-14 VITALS
HEART RATE: 99 BPM | HEIGHT: 68 IN | WEIGHT: 293 LBS | TEMPERATURE: 98.2 F | OXYGEN SATURATION: 99 % | BODY MASS INDEX: 44.41 KG/M2 | DIASTOLIC BLOOD PRESSURE: 88 MMHG | SYSTOLIC BLOOD PRESSURE: 118 MMHG

## 2022-12-14 DIAGNOSIS — E66.01 CLASS 3 SEVERE OBESITY DUE TO EXCESS CALORIES WITHOUT SERIOUS COMORBIDITY WITH BODY MASS INDEX (BMI) OF 45.0 TO 49.9 IN ADULT (HCC): ICD-10-CM

## 2022-12-14 DIAGNOSIS — Z76.89 ENCOUNTER TO ESTABLISH CARE: Primary | ICD-10-CM

## 2022-12-14 DIAGNOSIS — G47.00 INSOMNIA, UNSPECIFIED TYPE: ICD-10-CM

## 2022-12-14 DIAGNOSIS — R10.84 GENERALIZED ABDOMINAL PAIN: ICD-10-CM

## 2022-12-14 DIAGNOSIS — R63.5 WEIGHT GAIN: ICD-10-CM

## 2022-12-14 DIAGNOSIS — R19.7 DIARRHEA, UNSPECIFIED TYPE: ICD-10-CM

## 2022-12-14 PROCEDURE — 99204 OFFICE O/P NEW MOD 45 MIN: CPT | Performed by: NURSE PRACTITIONER

## 2022-12-14 SDOH — HEALTH STABILITY: PHYSICAL HEALTH: ON AVERAGE, HOW MANY MINUTES DO YOU ENGAGE IN EXERCISE AT THIS LEVEL?: 30 MIN

## 2022-12-14 SDOH — ECONOMIC STABILITY: FOOD INSECURITY: WITHIN THE PAST 12 MONTHS, YOU WORRIED THAT YOUR FOOD WOULD RUN OUT BEFORE YOU GOT MONEY TO BUY MORE.: NEVER TRUE

## 2022-12-14 SDOH — ECONOMIC STABILITY: FOOD INSECURITY: WITHIN THE PAST 12 MONTHS, THE FOOD YOU BOUGHT JUST DIDN'T LAST AND YOU DIDN'T HAVE MONEY TO GET MORE.: NEVER TRUE

## 2022-12-14 SDOH — HEALTH STABILITY: PHYSICAL HEALTH: ON AVERAGE, HOW MANY DAYS PER WEEK DO YOU ENGAGE IN MODERATE TO STRENUOUS EXERCISE (LIKE A BRISK WALK)?: 3 DAYS

## 2022-12-14 ASSESSMENT — PATIENT HEALTH QUESTIONNAIRE - PHQ9
SUM OF ALL RESPONSES TO PHQ QUESTIONS 1-9: 0
SUM OF ALL RESPONSES TO PHQ QUESTIONS 1-9: 0
SUM OF ALL RESPONSES TO PHQ9 QUESTIONS 1 & 2: 0
1. LITTLE INTEREST OR PLEASURE IN DOING THINGS: 0
SUM OF ALL RESPONSES TO PHQ QUESTIONS 1-9: 0
2. FEELING DOWN, DEPRESSED OR HOPELESS: 0
SUM OF ALL RESPONSES TO PHQ QUESTIONS 1-9: 0

## 2022-12-14 ASSESSMENT — ENCOUNTER SYMPTOMS
DIARRHEA: 1
EYES NEGATIVE: 1
ALLERGIC/IMMUNOLOGIC NEGATIVE: 1
RESPIRATORY NEGATIVE: 1
VOMITING: 0
NAUSEA: 0

## 2022-12-14 ASSESSMENT — SOCIAL DETERMINANTS OF HEALTH (SDOH)
WITHIN THE LAST YEAR, HAVE TO BEEN RAPED OR FORCED TO HAVE ANY KIND OF SEXUAL ACTIVITY BY YOUR PARTNER OR EX-PARTNER?: NO
HOW HARD IS IT FOR YOU TO PAY FOR THE VERY BASICS LIKE FOOD, HOUSING, MEDICAL CARE, AND HEATING?: SOMEWHAT HARD
WITHIN THE LAST YEAR, HAVE YOU BEEN KICKED, HIT, SLAPPED, OR OTHERWISE PHYSICALLY HURT BY YOUR PARTNER OR EX-PARTNER?: NO
WITHIN THE LAST YEAR, HAVE YOU BEEN AFRAID OF YOUR PARTNER OR EX-PARTNER?: NO
WITHIN THE LAST YEAR, HAVE YOU BEEN HUMILIATED OR EMOTIONALLY ABUSED IN OTHER WAYS BY YOUR PARTNER OR EX-PARTNER?: NO

## 2022-12-14 NOTE — PROGRESS NOTES
[facility] PHYSICIAN SERVICES  [facility] FAM[facility] MEDICINE  [address] [facility] [address] [address]  Dept: [phone]  Dept Fax: [phone]: [phone]    Cristel English is a 29 y.o. female who presents today for her medical conditions/complaints as noted below. Cristel English is c/o of New Patient (Establish care )        HPI:   Here to establish care. Previous PCP was [facility]wr[facility] in [facility]. States she has seen anyone regularly since 2019. States she has been \"clean\" for the past eight months. Does not drink alcohol. States she has gained weight around 80 pounds. Syringe exchange program.  She goes to [facility] now instead of syringe exchange program. States she was a previous IV drug user. She admits to drinking caffeine and sodas but will drink juice and water. She reports constant diarrhea since May. She reports if she cause, she will have diarrhea. She has been taking 2-4 anti-diarrheal per day with no relief. She states it does not matter what she eats, she has to go to bathroom. She still has her gallbladder. She reports at times the stomach tightens and \"spasm\". She reports she is having a lot of trouble sleeping. She been taking OTC sleep aide. States she has trouble going to sleep. She has tried Melatonin in the past.     She is currently in [facility] on Tuesday and Thursday from a back injury at work. She is going through [facility]. HPI   Chief Complaint   Patient presents with    New Patient     Establish care      No past medical history on file. No past surgical history on file. Vitals 95/22/9081   SYSTOLIC 505   DIASTOLIC 88   Pulse 99   Temp 98.2   SpO2 99   Weight 307 lb 3.2 oz   Height 5' 8\"   Body mass index 46.7 kg/m2       No family history on file.     Social History     Tobacco Use    Smoking status: Some Days     Packs/day: 0.50     Types: Cigarettes    Smokeless tobacco: Never   Substance Use Topics    Alcohol use: Not Currently      No current outpatient medications on file prior to visit. No current facility-administered medications on file prior to visit. Allergies   Allergen Reactions    Morphine Other (See Comments)     Pt states it makes her feel like her body is on fire        Health Maintenance   Topic Date Due    Varicella vaccine (1 of 2 - 2-dose childhood series) Never done    Pneumococcal 0-64 years Vaccine (1 - PCV) Never done    HIV screen  Never done    Hepatitis C screen  Never done    Cervical cancer screen  Never done    Flu vaccine (1) Never done    COVID-19 Vaccine (1) 01/01/2024 (Originally 1990)    Depression Screen  12/14/2023    DTaP/Tdap/Td vaccine (3 - Td or Tdap) 06/21/2028    Hepatitis A vaccine  Aged Out    Hib vaccine  Aged Out    Meningococcal (ACWY) vaccine  Aged Out       Subjective   SUBJECTIVE/OBJECTIVE:  @HPI@    Review of Systems   Constitutional:  Positive for unexpected weight change (weight gain). HENT: Negative. Eyes: Negative. Respiratory: Negative. Cardiovascular: Negative. Gastrointestinal:  Positive for diarrhea. Negative for nausea and vomiting. Endocrine: Negative. Genitourinary: Negative. Musculoskeletal: Negative. Skin: Negative. Allergic/Immunologic: Negative. Neurological: Negative. Hematological: Negative. Psychiatric/Behavioral:  Positive for sleep disturbance. Objective   Physical Exam  Vitals and nursing note reviewed. Constitutional:       Appearance: Normal appearance. She is obese. HENT:      Head: Normocephalic. Right Ear: Tympanic membrane normal.      Left Ear: Tympanic membrane normal.      Nose: Nose normal.   Eyes:      General:         Right eye: No discharge. Left eye: No discharge. Pupils: Pupils are equal, round, and reactive to light. Cardiovascular:      Rate and Rhythm: Normal rate and regular rhythm. Pulses: Normal pulses. Heart sounds: Normal heart sounds. Pulmonary:      Effort: Pulmonary effort is normal.      Breath sounds: Normal breath sounds. No wheezing or rhonchi. Abdominal:      General: Abdomen is flat. Bowel sounds are normal.      Palpations: Abdomen is soft. Tenderness: There is no abdominal tenderness. Musculoskeletal:         General: Normal range of motion. Cervical back: Normal range of motion. Skin:     General: Skin is warm and dry. Neurological:      Mental Status: She is alert and oriented to person, place, and time. Psychiatric:         Mood and Affect: Mood normal.         Behavior: Behavior normal.         Thought Content: Thought content normal.         Judgment: Judgment normal.          ASSESSMENT/PLAN:  1. Encounter to establish care  2. Diarrhea, unspecified type  -     CBC with Auto Differential; Future  -     Comprehensive Metabolic Panel; Future  -     US GALLBLADDER RUQ; Future  3. Generalized abdominal pain  -     CBC with Auto Differential; Future  -     TSH with Reflex to FT4; Future  -     US GALLBLADDER RUQ; Future  4. Weight gain  -     TSH with Reflex to FT4; Future  5. Class 3 severe obesity due to excess calories without serious comorbidity with body mass index (BMI) of 45.0 to 49.9 in adult (HCC)  -     CBC with Auto Differential; Future  -     Comprehensive Metabolic Panel; Future  -     TSH with Reflex to FT4; Future  6. Insomnia, unspecified type  -     CBC with Auto Differential; Future  -     Comprehensive Metabolic Panel; Future  -     TSH with Reflex to FT4; Future    Return in about 1 month (around 1/14/2023) for follow up with PCP. More than 50% of the time was spent counseling and coordinating care for a total time of 25-30min face to face. Fasting labs and will call with results. Will try Melatonin  Decrease intake of sodas and healthier eating  Schedule RUQ u/s  Follow up in one month on weight gain and insomnia.   PDMP Monitoring:    Last PDMP [place] as Reviewed:  Review User Review Instant Review Result            Urine Drug Screenings (1 yr)    No resulted procedures found. Medication Contract and Consent for Opioid Use Documents Filed        No documents found                     Patient given educational materials -see patient instructions. Discussed use, benefit, and side effects of prescribed medications. All patient questions answered. Pt voiced understanding. Reviewed health maintenance. Instructed to continue currentmedications, diet and exercise. Patient agreed with treatment plan. Follow up as directed. MEDICATIONS:  No orders of the defined types were placed in this encounter. ORDERS:  Orders Placed This Encounter   Procedures    US GALLBLADDER RUQ    CBC with Auto Differential    Comprehensive Metabolic Panel    TSH with Reflex to FT4       Follow-up:  Return in about 1 month (around 1/14/2023) for follow up with PCP. PATIENT INSTRUCTIONS:  There are no Patient Instructions on file for this visit. Electronically signed by YINKA Mchugh on 12/15/2022 at 1:44 PM    EMR Dragon/transcription disclaimer:  Much of thisencounter note is electronic transcription/translation of spoken language to printed texts. The electronic translation of spoken language may be erroneous, or at times, nonsensical words or phrases may be inadvertentlytranscribed.   Although I have reviewed the note for such errors, some may still exist.

## 2022-12-15 ENCOUNTER — TREATMENT (OUTPATIENT)
Dept: PHYSICAL THERAPY | Facility: CLINIC | Age: 32
End: 2022-12-15

## 2022-12-15 DIAGNOSIS — M54.50 LEFT LUMBAR PAIN: Primary | ICD-10-CM

## 2022-12-15 PROCEDURE — 97110 THERAPEUTIC EXERCISES: CPT | Performed by: PHYSICAL THERAPIST

## 2022-12-15 NOTE — PROGRESS NOTES
"                                                                Physical Therapy Treatment Note  [address] [place] [address]    Patient: Kath Uribe                                                 Visit Date: 12/15/2022  :     1990    Referring practitioner:    YANN Arias  Date of Initial Visit:          Type: THERAPY  Noted: 10/24/2022    Patient seen for 8 sessions    Visit Diagnoses:    ICD-10-CM ICD-9-CM   1. Left lumbar pain  M54.50 724.2     SUBJECTIVE     Subjective She reports doing\"good\" her back hasn't been bothering her    PAIN: 0/10         OBJECTIVE     Objective      Therapeutic Exercises    42774 Units Comments   B hip ER stretches with intermittent inferior lateral glides     B open books x 15 in SL     Progressive thoracic stretch     LTR's     Bridging x 20                     Timed Minutes 45       Therapy Education/Self Care 25683   Education offered today    [facility] [email or web address]    Ongoing HEP   Date: 2022  Prepared by: Darrel Koenig     Exercises  Cat Cow - 2 x daily - 7 x weekly - 1 sets - 10 reps  Child's Pose Stretch - 5 x daily - 7 x weekly - 2 sets - 30 hold  Child's Pose with Sidebending - 5 x daily - 7 x weekly - 2 sets - 30 hold  Quadruped Full Range Thoracic Rotation with Reach - 5 x daily - 7 x weekly - 1 sets - 10 reps  Bird Dog - 2 x daily - 7 x weekly - 2 sets - 10 reps  Supine Piriformis Stretch with Foot on Ground - 2 x daily - 7 x weekly - 2 sets - 30 hold  Supine Bridge - 2 x daily - 7 x weekly - 2 sets - 10 reps  Sidelying Hip Abduction - 2 x daily - 7 x weekly - 2 sets - 10 reps      Timed Minutes        Total Timed Treatment:     45   mins  Total Time of Visit:             45   mins         ASSESSMENT/PLAN     GOALS  Goals                                                  Progress Note due by 22                                                              Recert due by 23   STG by: 3 weeks Comments Date Status   Improve christine " thoracolumbar mobility  hypomobile throughout thoracic spine, TTP >grade 2 11/22 ongoing   Improve muscle relaxation of left TL paraspinals     ongoing   Left hip flexion to passive full ROM  Measured  PROM 102* degrees 11/11 ongoing   LTG by: 6 weeks         Able to forward bend to touch toes without exacerbation of pain Unable to complete without increase in pain.  12/2 progressing   Reports pain no greater than 1-2/10 when it does occur. 0/10 12/15 ongoing   Improve Modified Oswestry to 5 or less  14/50 on 10/24/2022  15/50 on 11/22 11/22 ongoing   Independent with HEP for flexibility and core/hip stability. Having difficulty without bird dogs-- worked on during session today. She performed well, but has more difficulty with R arm/L leg extension. Instructed patient to continue practicing at home.  12/2 progressing         Assessment/Plan     ASSESSMENT:She is doing very well and has a follow up with the referring physician before her next PT appointment. I suspect she is approaching a D/C status.    PLAN: Inquire about her physician follow up, continue therex    SIGNATURE: Aram Lara [facility], [place] License #: [id number]  Electronically Signed on 12/15/2022        [address] [place] [address]. [address]  [phone]

## 2022-12-20 ENCOUNTER — TREATMENT (OUTPATIENT)
Dept: PHYSICAL THERAPY | Facility: CLINIC | Age: 32
End: 2022-12-20

## 2022-12-20 DIAGNOSIS — M54.50 LEFT LUMBAR PAIN: Primary | ICD-10-CM

## 2022-12-20 PROCEDURE — 97110 THERAPEUTIC EXERCISES: CPT | Performed by: PHYSICAL THERAPIST

## 2022-12-20 NOTE — PROGRESS NOTES
Physical Therapy Treatment Note  [address] [place] [address]    Patient: Kath Uribe                                                 Visit Date: 2022  :     1990    Referring practitioner:    YANN Arias  Date of Initial Visit:          Type: THERAPY  Noted: 10/24/2022    Patient seen for 9 sessions    Visit Diagnoses:    ICD-10-CM ICD-9-CM   1. Left lumbar pain  M54.50 724.2     SUBJECTIVE     Subjective She reports she had a follow up with her Dr.     PAIN: 0/10         OBJECTIVE     Objective      Therapeutic Exercises    01948 Units Comments   Obtained an updated JONATHON     B LE Shuttle Press 8 bands with eccentric focus x 5 min     B unilateral LE Shuttle Press 6 bands with eccentric focus x 5 min each     Hi/low wood chops @ [employer] #2 x      Low/hi wood chops @ [employer] #2 x     B hip ER stretches with intermittent inferior lateral glides     Bridging with 55 cm SB x 10          Timed Minutes 44       Therapy Education/Self Care 52129   Education offered today POC   [facility] Code [email or web address]    Ongoing HEP   Date: 2022  Prepared by: Darrel Koenig     Exercises  Cat Cow - 2 x daily - 7 x weekly - 1 sets - 10 reps  Child's Pose Stretch - 5 x daily - 7 x weekly - 2 sets - 30 hold  Child's Pose with Sidebending - 5 x daily - 7 x weekly - 2 sets - 30 hold  Quadruped Full Range Thoracic Rotation with Reach - 5 x daily - 7 x weekly - 1 sets - 10 reps  Bird Dog - 2 x daily - 7 x weekly - 2 sets - 10 reps  Supine Piriformis Stretch with Foot on Ground - 2 x daily - 7 x weekly - 2 sets - 30 hold  Supine Bridge - 2 x daily - 7 x weekly - 2 sets - 10 reps  Sidelying Hip Abduction - 2 x daily - 7 x weekly - 2 sets - 10 reps      Timed Minutes        Total Timed Treatment:     44   mins  Total Time of Visit:             44   mins         ASSESSMENT/PLAN     GOALS    Goals                                                  Progress  Note due by 12/22/22                                                              Recert due by 1/21/23   STG by: 3 weeks Comments Date Status   Improve christine thoracolumbar mobility  hypomobile throughout thoracic spine, TTP >grade 2 11/22 ongoing   Improve muscle relaxation of left TL paraspinals     ongoing   Left hip flexion to passive full ROM  Measured  PROM 102* degrees 11/11 ongoing   LTG by: 6 weeks         Able to forward bend to touch toes without exacerbation of pain Unable to complete without increase in pain.  12/2 progressing   Reports pain no greater than 1-2/10 when it does occur. 0/10 12/15 ongoing   Improve Modified Oswestry to 5 or less  14/50 on 10/24/2022  15/50 on 11/22  8/50 on 12/20 12/20 ongoing   Independent with HEP for flexibility and core/hip stability. Having difficulty without bird dogs-- worked on during session today. She performed well, but has more difficulty with R arm/L leg extension. Instructed patient to continue practicing at home.  12/2 progressing       Assessment/Plan     ASSESSMENT: She had another decrease in her JONATHON score and reported being pain free today.    PLAN: Review all goals for progress note and likely D/C.    SIGNATURE: Arma Lara PTA, [place] License #: [id number]  Electronically Signed on 12/20/2022        Neyda [place] [address]  [phone]

## 2022-12-21 ENCOUNTER — HOSPITAL ENCOUNTER (OUTPATIENT)
Dept: ULTRASOUND IMAGING | Age: 32
Discharge: HOME | End: 2022-12-21
Payer: MEDICAID

## 2022-12-21 DIAGNOSIS — R19.7 DIARRHEA, UNSPECIFIED TYPE: ICD-10-CM

## 2022-12-21 DIAGNOSIS — E66.01 CLASS 3 SEVERE OBESITY DUE TO EXCESS CALORIES WITHOUT SERIOUS COMORBIDITY WITH BODY MASS INDEX (BMI) OF 45.0 TO 49.9 IN ADULT (HCC): ICD-10-CM

## 2022-12-21 DIAGNOSIS — R10.84 GENERALIZED ABDOMINAL PAIN: ICD-10-CM

## 2022-12-21 DIAGNOSIS — G47.00 INSOMNIA, UNSPECIFIED TYPE: ICD-10-CM

## 2022-12-21 DIAGNOSIS — R63.5 WEIGHT GAIN: ICD-10-CM

## 2022-12-21 LAB
ALBUMIN SERPL-MCNC: 4 G/DL (ref 3.5–5.2)
ALP BLD-CCNC: 129 U/L (ref 35–104)
ALT SERPL-CCNC: 26 U/L (ref 5–33)
ANION GAP SERPL CALCULATED.3IONS-SCNC: 13 MMOL/L (ref 7–19)
AST SERPL-CCNC: 20 U/L (ref 5–32)
BASOPHILS ABSOLUTE: 0.1 K/UL (ref 0–0.2)
BASOPHILS RELATIVE PERCENT: 0.6 % (ref 0–1)
BILIRUB SERPL-MCNC: 0.3 MG/DL (ref 0.2–1.2)
BUN BLDV-MCNC: 12 MG/DL (ref 6–20)
CALCIUM SERPL-MCNC: 9.5 MG/DL (ref 8.6–10)
CHLORIDE BLD-SCNC: 105 MMOL/L (ref 98–111)
CO2: 23 MMOL/L (ref 22–29)
CREAT SERPL-MCNC: 0.6 MG/DL (ref 0.5–0.9)
EOSINOPHILS ABSOLUTE: 0.3 K/UL (ref 0–0.6)
EOSINOPHILS RELATIVE PERCENT: 2.7 % (ref 0–5)
GFR SERPL CREATININE-BSD FRML MDRD: >60 ML/MIN/{1.73_M2}
GLUCOSE BLD-MCNC: 81 MG/DL (ref 74–109)
HCT VFR BLD CALC: 47.8 % (ref 37–47)
HEMOGLOBIN: 14.6 G/DL (ref 12–16)
IMMATURE GRANULOCYTES #: 0.1 K/UL
LYMPHOCYTES ABSOLUTE: 3.1 K/UL (ref 1.1–4.5)
LYMPHOCYTES RELATIVE PERCENT: 28.6 % (ref 20–40)
MCH RBC QN AUTO: 28 PG (ref 27–31)
MCHC RBC AUTO-ENTMCNC: 30.5 G/DL (ref 33–37)
MCV RBC AUTO: 91.7 FL (ref 81–99)
MONOCYTES ABSOLUTE: 0.7 K/UL (ref 0–0.9)
MONOCYTES RELATIVE PERCENT: 6.3 % (ref 0–10)
NEUTROPHILS ABSOLUTE: 6.7 K/UL (ref 1.5–7.5)
NEUTROPHILS RELATIVE PERCENT: 61.2 % (ref 50–65)
PDW BLD-RTO: 13.8 % (ref 11.5–14.5)
PLATELET # BLD: 339 K/UL (ref 130–400)
PMV BLD AUTO: 11.2 FL (ref 9.4–12.3)
POTASSIUM SERPL-SCNC: 4.8 MMOL/L (ref 3.5–5)
RBC # BLD: 5.21 M/UL (ref 4.2–5.4)
SODIUM BLD-SCNC: 141 MMOL/L (ref 136–145)
TOTAL PROTEIN: 6.7 G/DL (ref 6.6–8.7)
TSH REFLEX FT4: 1.87 UIU/ML (ref 0.35–5.5)
WBC # BLD: 10.9 K/UL (ref 4.8–10.8)

## 2022-12-21 PROCEDURE — 76705 ECHO EXAM OF ABDOMEN: CPT

## 2022-12-22 ENCOUNTER — TREATMENT (OUTPATIENT)
Dept: PHYSICAL THERAPY | Facility: CLINIC | Age: 32
End: 2022-12-22

## 2022-12-22 DIAGNOSIS — M54.50 LEFT LUMBAR PAIN: Primary | ICD-10-CM

## 2022-12-22 PROCEDURE — 97110 THERAPEUTIC EXERCISES: CPT | Performed by: PHYSICAL THERAPIST

## 2022-12-22 NOTE — PROGRESS NOTES
Physical Therapy Treatment Note, 30 Day Progress Note and Discharge Note  [address] [place] [address]    Patient: Kath rUibe                                                 Visit Date: 2022  :     1990    Referring practitioner:    YANN Arias  Date of Initial Visit:          Type: THERAPY  Noted: 10/24/2022    Patient seen for 10 sessions    Visit Diagnoses:    ICD-10-CM ICD-9-CM   1. Left lumbar pain  M54.50 724.2     SUBJECTIVE     Subjective No new complaints reports 85-90% improved.     PAIN: 0/10         OBJECTIVE     Objective      Therapeutic Exercises    23079 Units Comments   Progressive thoracic stretch on 1/2 foam roller     Push/pull #40 sled x 100 ft     B LE Shuttle Press 8 bands with eccentric focus x 5 min     B unilateral LE Shuttle Press 6 bands with eccentric focus x 5 min each     Windshield wipers with a twist with 45 cm SB and #4 x15     B SL hip ext with 45 cm SB     Timed Minutes 45       Therapy Education/Self Care 35514   Education offered today    [facility] Code [email or web address]    Ongoing HEP   Date: 2022  Prepared by: Darrel Koenig     Exercises  Cat Cow - 2 x daily - 7 x weekly - 1 sets - 10 reps  Child's Pose Stretch - 5 x daily - 7 x weekly - 2 sets - 30 hold  Child's Pose with Sidebending - 5 x daily - 7 x weekly - 2 sets - 30 hold  Quadruped Full Range Thoracic Rotation with Reach - 5 x daily - 7 x weekly - 1 sets - 10 reps  Bird Dog - 2 x daily - 7 x weekly - 2 sets - 10 reps  Supine Piriformis Stretch with Foot on Ground - 2 x daily - 7 x weekly - 2 sets - 30 hold  Supine Bridge - 2 x daily - 7 x weekly - 2 sets - 10 reps  Sidelying Hip Abduction - 2 x daily - 7 x weekly - 2 sets - 10 reps      Timed Minutes        Total Timed Treatment:     45   mins  Total Time of Visit:             45   mins         ASSESSMENT/PLAN     GOALS  Goals                                                   Progress Note due by 12/22/22                                                              Recert due by 1/21/23   STG by: 3 weeks Comments Date Status   Improve christine thoracolumbar mobility  addressed with foam roller stretches 12/22 Met   Improve muscle relaxation of left TL paraspinals  no issues reported 12/22 Met   Left hip flexion to passive full ROM  107AROM 12/22 Not Met   LTG by: 6 weeks         Able to forward bend to touch toes without exacerbation of pain Able to bend forward to ankle level and no pain 12/22 Partially met   Reports pain no greater than 1-2/10 when it does occur. 0/10 12/22 Met   Improve Modified Oswestry to 5 or less  14/50 on 10/24/2022  15/50 on 11/22  8/50 on 12/20 12/20 Not Met   Independent with HEP for flexibility and core/hip stability. Reinforced today 12/22 Met         Assessment/Plan     ASSESSMENT: She has progressed rather well and is pleased with the results of the PT POC. She has met or partially met all but two of her goals at this point.    PLAN: DC POC    SIGNATURE: Aram Lara PTA, [place] License #: [id number]  Electronically Signed on 12/22/2022        [address] [place] [address] [place] [address]  [phone]

## 2022-12-23 NOTE — PROGRESS NOTES
I have reviewed the notes, assessments, and/or procedures performed by Aram Lara PTA, I concur with her/his documentation of Kath Uribe.

## 2022-12-27 DIAGNOSIS — K80.20 CALCULUS OF GALLBLADDER WITHOUT CHOLECYSTITIS WITHOUT OBSTRUCTION: Primary | ICD-10-CM

## 2022-12-28 DIAGNOSIS — R74.8 ELEVATED ALKALINE PHOSPHATASE LEVEL: Primary | ICD-10-CM

## 2023-01-05 ENCOUNTER — OFFICE VISIT (OUTPATIENT)
Dept: SURGERY | Age: 33
End: 2023-01-05
Payer: MEDICAID

## 2023-01-05 VITALS
HEIGHT: 68 IN | WEIGHT: 293 LBS | OXYGEN SATURATION: 99 % | BODY MASS INDEX: 44.41 KG/M2 | HEART RATE: 80 BPM | TEMPERATURE: 97.6 F

## 2023-01-05 DIAGNOSIS — E66.01 CLASS 3 SEVERE OBESITY DUE TO EXCESS CALORIES WITH BODY MASS INDEX (BMI) OF 45.0 TO 49.9 IN ADULT, UNSPECIFIED WHETHER SERIOUS COMORBIDITY PRESENT (HCC): ICD-10-CM

## 2023-01-05 DIAGNOSIS — K21.9 GASTROESOPHAGEAL REFLUX DISEASE WITHOUT ESOPHAGITIS: ICD-10-CM

## 2023-01-05 DIAGNOSIS — K80.10 CALCULUS OF GALLBLADDER WITH CHRONIC CHOLECYSTITIS WITHOUT OBSTRUCTION: Primary | ICD-10-CM

## 2023-01-05 PROCEDURE — 99204 OFFICE O/P NEW MOD 45 MIN: CPT | Performed by: SURGERY

## 2023-01-05 RX ORDER — SODIUM CHLORIDE 9 MG/ML
INJECTION, SOLUTION INTRAVENOUS PRN
OUTPATIENT
Start: 2023-01-05

## 2023-01-05 RX ORDER — IBUPROFEN 800 MG/1
TABLET ORAL
COMMUNITY

## 2023-01-05 RX ORDER — SODIUM CHLORIDE 0.9 % (FLUSH) 0.9 %
5-40 SYRINGE (ML) INJECTION EVERY 12 HOURS SCHEDULED
OUTPATIENT
Start: 2023-01-05

## 2023-01-05 RX ORDER — CELECOXIB 200 MG/1
200 CAPSULE ORAL ONCE
OUTPATIENT
Start: 2023-01-05 | End: 2023-01-05

## 2023-01-05 RX ORDER — BUSPIRONE HYDROCHLORIDE 15 MG/1
TABLET ORAL
COMMUNITY
Start: 2022-12-15

## 2023-01-05 RX ORDER — ACETAMINOPHEN 325 MG/1
1000 TABLET ORAL ONCE
OUTPATIENT
Start: 2023-01-05 | End: 2023-01-05

## 2023-01-05 RX ORDER — SODIUM CHLORIDE 0.9 % (FLUSH) 0.9 %
5-40 SYRINGE (ML) INJECTION PRN
OUTPATIENT
Start: 2023-01-05

## 2023-01-05 RX ORDER — SODIUM CHLORIDE, SODIUM LACTATE, POTASSIUM CHLORIDE, CALCIUM CHLORIDE 600; 310; 30; 20 MG/100ML; MG/100ML; MG/100ML; MG/100ML
INJECTION, SOLUTION INTRAVENOUS CONTINUOUS
OUTPATIENT
Start: 2023-01-05

## 2023-01-05 RX ORDER — LAMOTRIGINE 100 MG/1
TABLET ORAL
COMMUNITY
Start: 2022-12-15

## 2023-01-05 ASSESSMENT — ENCOUNTER SYMPTOMS
CHEST TIGHTNESS: 1
VOMITING: 0
SORE THROAT: 0
ABDOMINAL DISTENTION: 0
EYE REDNESS: 0
NAUSEA: 0
DIARRHEA: 0
EYE PAIN: 0
SHORTNESS OF BREATH: 0
CONSTIPATION: 0
BACK PAIN: 0
ABDOMINAL PAIN: 1
COLOR CHANGE: 0
COUGH: 0

## 2023-01-05 NOTE — H&P (VIEW-ONLY)
SUBJECTIVE:  Ms. Eugenio Joshua is a 29 y.o. female who presents today with epigastric abdominal pain and diarrhea that has been present for 7 months. She states the pain is sharp and stabbing in the upper, central abdomen and radiates through to her back. She states it is worse at night, and she lays on her left side \"because she read that on google\". She is unsure of what causes this pain, but maybe [place] food last night. She notes significant heartburn as well, with burning pain in the epigastric region and up her chest. She states spicy foods make this worse. She has had diarrhea for 7 months and this is with any foods. Past Medical History:   Diagnosis Date    Anxiety     Headache      Past Surgical History:   Procedure Laterality Date     SECTION      MYRINGOTOMY W/ TUBES      TUBAL LIGATION       Current Outpatient Medications   Medication Sig Dispense Refill    busPIRone (BUSPAR) 15 MG tablet       lamoTRIgine (LAMICTAL) 100 MG tablet       ibuprofen (ADVIL;MOTRIN) 800 MG tablet ibuprofen 800 mg tablet       No current facility-administered medications for this visit. Allergies: Morphine    No family history on file. Social History     Tobacco Use    Smoking status: Some Days     Packs/day: 0.50     Types: Cigarettes    Smokeless tobacco: Never   Substance Use Topics    Alcohol use: Not Currently       Review of Systems   Constitutional:  Positive for fatigue. Negative for fever and unexpected weight change. HENT:  Negative for hearing loss, nosebleeds and sore throat. Eyes:  Negative for pain, redness and visual disturbance. Respiratory:  Positive for chest tightness. Negative for cough and shortness of breath. Cardiovascular:  Negative for chest pain, palpitations and leg swelling. Gastrointestinal:  Positive for abdominal pain. Negative for abdominal distention, constipation, diarrhea, nausea and vomiting.    Endocrine: Negative for cold intolerance, heat intolerance and polydipsia. Genitourinary:  Negative for difficulty urinating, frequency and urgency. Musculoskeletal:  Negative for back pain, joint swelling and neck pain. Skin:  Negative for color change, rash and wound. Allergic/Immunologic: Negative for environmental allergies and food allergies. Neurological:  Positive for headaches. Negative for seizures and light-headedness. Hematological:  Negative for adenopathy. Does not bruise/bleed easily. Psychiatric/Behavioral:  Positive for dysphoric mood. Negative for confusion, sleep disturbance and suicidal ideas. The patient is nervous/anxious. OBJECTIVE:  Pulse 80   Temp 97.6 °F (36.4 °C) (Temporal)   Ht 5' 8\" (1.727 m)   Wt (!) 314 lb (142.4 kg)   SpO2 99%   BMI 47.74 kg/m²   Physical Exam  Vitals reviewed. Constitutional:       Appearance: Normal appearance. She is well-developed. She is obese. HENT:      Head: Normocephalic and atraumatic. Eyes:      Pupils: Pupils are equal, round, and reactive to light. Cardiovascular:      Rate and Rhythm: Normal rate and regular rhythm. Heart sounds: Normal heart sounds. Pulmonary:      Effort: Pulmonary effort is normal.      Breath sounds: Normal breath sounds. No wheezing or rales. Abdominal:      General: Abdomen is protuberant. Bowel sounds are normal. There is no distension. Palpations: Abdomen is soft. Tenderness: There is abdominal tenderness in the right upper quadrant and epigastric area. There is no guarding or rebound. Musculoskeletal:         General: Normal range of motion. Cervical back: Normal range of motion. Lymphadenopathy:      Cervical: No cervical adenopathy. Skin:     General: Skin is warm and dry. Neurological:      Mental Status: She is alert and oriented to person, place, and time. Deep Tendon Reflexes: Reflexes are normal and symmetric.    Psychiatric:         Attention and Perception: Attention normal.         Mood and Affect: Mood normal. Behavior: Behavior normal.         Thought Content: Thought content normal.         Judgment: Judgment normal.       12/21/2022 RUQ US       Impression   Cholelithiasis   Otherwise Remaining right upper quadrant sonogram appears to be normal         ASSESSMENT:   Diagnosis Orders   1. Calculus of gallbladder with chronic cholecystitis without obstruction        2. Gastroesophageal reflux disease without esophagitis  Greg Wheeler MD, Gastroenterology, [place]      3. Class 3 severe obesity due to excess calories with body mass index (BMI) of 45.0 to 49.9 in adult, unspecified whether serious comorbidity present [facility])            PLAN:  Orders Placed This Encounter   Procedures    Greg Wheeler MD, Gastroenterology, [place]     Referral Priority:   Routine     Referral Type:   Eval and Treat     Referral Reason:   Specialty Services Required     Referred to Provider:   Geronimo Tiwari MD     Requested Specialty:   Gastroenterology     Number of Visits Requested:   1       No orders of the defined types were placed in this encounter. Reviewed biliary and PUD/GERD pathophysiology with the patient who notes understanding. Discussed that she has an overlap of symptoms and some may remain following gallbladder surgery. Discussed lifestyle modifications and OTC medications for additional symptom management. The risks, benefits, and alternatives were discussed with the pt. She is willing to accept the risks and proceed with a robotic cholecystectomy. The surgical risks included but not limited to bleeding, infection, perforation, bile duct injury or leak, risk of needing further surgery, etc. The anesthetic risks included heart attacks, strokes, pneumonia, phlebitis, etc.  She is willing to accept all risks and proceed with surgery. No guarantees have been given. Return for Post-operative Visit.     Anil Perez DO 0/6/3185 10:56 AM

## 2023-01-05 NOTE — LETTER
SCHEDULING INSTRUCTIONS:     Patient: Oliver Ponce  : 1990    Hospital: Hospital    Admitting Physician:  Dr. Carile Mo    Diagnosis: Chronic Cholecystitis with Cholelithiasis    Procedure: Robotic assisted laparoscopic cholecystectomy with ICG    ALLOW ADDITIONAL 30 MINS FOR TURNOVER EXCEPT FOR PHYSICIAN'S BOUNCE DAY    Anesthesia: GEN    Admission:Outpatient     Date: 2023                 NOT TO BE USED OUTSIDE OF THE OFFICE

## 2023-01-05 NOTE — PROGRESS NOTES
SUBJECTIVE:  Ms. Patti Franklin is a 29 y.o. female who presents today with epigastric abdominal pain and diarrhea that has been present for 7 months. She states the pain is sharp and stabbing in the upper, central abdomen and radiates through to her back. She states it is worse at night, and she lays on her left side \"because she read that on google\". She is unsure of what causes this pain, but maybe [place] food last night. She notes significant heartburn as well, with burning pain in the epigastric region and up her chest. She states spicy foods make this worse. She has had diarrhea for 7 months and this is with any foods. Past Medical History:   Diagnosis Date    Anxiety     Headache      Past Surgical History:   Procedure Laterality Date     SECTION      MYRINGOTOMY W/ TUBES      TUBAL LIGATION       Current Outpatient Medications   Medication Sig Dispense Refill    busPIRone (BUSPAR) 15 MG tablet       lamoTRIgine (LAMICTAL) 100 MG tablet       ibuprofen (ADVIL;MOTRIN) 800 MG tablet ibuprofen 800 mg tablet       No current facility-administered medications for this visit. Allergies: Morphine    No family history on file. Social History     Tobacco Use    Smoking status: Some Days     Packs/day: 0.50     Types: Cigarettes    Smokeless tobacco: Never   Substance Use Topics    Alcohol use: Not Currently       Review of Systems   Constitutional:  Positive for fatigue. Negative for fever and unexpected weight change. HENT:  Negative for hearing loss, nosebleeds and sore throat. Eyes:  Negative for pain, redness and visual disturbance. Respiratory:  Positive for chest tightness. Negative for cough and shortness of breath. Cardiovascular:  Negative for chest pain, palpitations and leg swelling. Gastrointestinal:  Positive for abdominal pain. Negative for abdominal distention, constipation, diarrhea, nausea and vomiting.    Endocrine: Negative for cold intolerance, heat intolerance and polydipsia. Genitourinary:  Negative for difficulty urinating, frequency and urgency. Musculoskeletal:  Negative for back pain, joint swelling and neck pain. Skin:  Negative for color change, rash and wound. Allergic/Immunologic: Negative for environmental allergies and food allergies. Neurological:  Positive for headaches. Negative for seizures and light-headedness. Hematological:  Negative for adenopathy. Does not bruise/bleed easily. Psychiatric/Behavioral:  Positive for dysphoric mood. Negative for confusion, sleep disturbance and suicidal ideas. The patient is nervous/anxious. OBJECTIVE:  Pulse 80   Temp 97.6 °F (36.4 °C) (Temporal)   Ht 5' 8\" (1.727 m)   Wt (!) 314 lb (142.4 kg)   SpO2 99%   BMI 47.74 kg/m²   Physical Exam  Vitals reviewed. Constitutional:       Appearance: Normal appearance. She is well-developed. She is obese. HENT:      Head: Normocephalic and atraumatic. Eyes:      Pupils: Pupils are equal, round, and reactive to light. Cardiovascular:      Rate and Rhythm: Normal rate and regular rhythm. Heart sounds: Normal heart sounds. Pulmonary:      Effort: Pulmonary effort is normal.      Breath sounds: Normal breath sounds. No wheezing or rales. Abdominal:      General: Abdomen is protuberant. Bowel sounds are normal. There is no distension. Palpations: Abdomen is soft. Tenderness: There is abdominal tenderness in the right upper quadrant and epigastric area. There is no guarding or rebound. Musculoskeletal:         General: Normal range of motion. Cervical back: Normal range of motion. Lymphadenopathy:      Cervical: No cervical adenopathy. Skin:     General: Skin is warm and dry. Neurological:      Mental Status: She is alert and oriented to person, place, and time. Deep Tendon Reflexes: Reflexes are normal and symmetric.    Psychiatric:         Attention and Perception: Attention normal.         Mood and Affect: Mood normal. Behavior: Behavior normal.         Thought Content: Thought content normal.         Judgment: Judgment normal.       12/21/2022 RUQ US       Impression   Cholelithiasis   Otherwise Remaining right upper quadrant sonogram appears to be normal         ASSESSMENT:   Diagnosis Orders   1. Calculus of gallbladder with chronic cholecystitis without obstruction        2. Gastroesophageal reflux disease without esophagitis  David Butler MD, Gastroenterology, [place]      3. Class 3 severe obesity due to excess calories with body mass index (BMI) of 45.0 to 49.9 in adult, unspecified whether serious comorbidity present [facility])            PLAN:  Orders Placed This Encounter   Procedures    David Butler MD, Gastroenterology, [place]     Referral Priority:   Routine     Referral Type:   Eval and Treat     Referral Reason:   Specialty Services Required     Referred to Provider:   Elaina Cavanaugh MD     Requested Specialty:   Gastroenterology     Number of Visits Requested:   1       No orders of the defined types were placed in this encounter. Reviewed biliary and PUD/GERD pathophysiology with the patient who notes understanding. Discussed that she has an overlap of symptoms and some may remain following gallbladder surgery. Discussed lifestyle modifications and OTC medications for additional symptom management. The risks, benefits, and alternatives were discussed with the pt. She is willing to accept the risks and proceed with a robotic cholecystectomy. The surgical risks included but not limited to bleeding, infection, perforation, bile duct injury or leak, risk of needing further surgery, etc. The anesthetic risks included heart attacks, strokes, pneumonia, phlebitis, etc.  She is willing to accept all risks and proceed with surgery. No guarantees have been given. Return for Post-operative Visit.     DO [place] 3/0/3731 10:56 AM

## 2023-01-11 ENCOUNTER — HOSPITAL ENCOUNTER (OUTPATIENT)
Dept: GENERAL RADIOLOGY | Age: 33
Discharge: HOME | End: 2023-01-11
Payer: MEDICAID

## 2023-01-11 ENCOUNTER — OFFICE VISIT (OUTPATIENT)
Dept: PRIMARY CARE CLINIC | Age: 33
End: 2023-01-11
Payer: MEDICAID

## 2023-01-11 VITALS
OXYGEN SATURATION: 99 % | HEART RATE: 96 BPM | BODY MASS INDEX: 47.41 KG/M2 | DIASTOLIC BLOOD PRESSURE: 84 MMHG | TEMPERATURE: 97.9 F | SYSTOLIC BLOOD PRESSURE: 122 MMHG | WEIGHT: 293 LBS

## 2023-01-11 DIAGNOSIS — M23.8X1 CREPITUS OF JOINT OF RIGHT KNEE: ICD-10-CM

## 2023-01-11 DIAGNOSIS — S33.5XXA LUMBAR SPRAIN, INITIAL ENCOUNTER: ICD-10-CM

## 2023-01-11 DIAGNOSIS — G89.29 CHRONIC PAIN OF LEFT KNEE: ICD-10-CM

## 2023-01-11 DIAGNOSIS — M25.562 CHRONIC PAIN OF LEFT KNEE: ICD-10-CM

## 2023-01-11 DIAGNOSIS — M25.561 CHRONIC PAIN OF RIGHT KNEE: ICD-10-CM

## 2023-01-11 DIAGNOSIS — M23.8X2 CREPITUS OF JOINT OF LEFT KNEE: ICD-10-CM

## 2023-01-11 DIAGNOSIS — G89.29 CHRONIC PAIN OF LEFT KNEE: Primary | ICD-10-CM

## 2023-01-11 DIAGNOSIS — G89.29 CHRONIC PAIN OF RIGHT KNEE: ICD-10-CM

## 2023-01-11 DIAGNOSIS — M25.562 CHRONIC PAIN OF LEFT KNEE: Primary | ICD-10-CM

## 2023-01-11 DIAGNOSIS — G43.909 MIGRAINE WITHOUT STATUS MIGRAINOSUS, NOT INTRACTABLE, UNSPECIFIED MIGRAINE TYPE: ICD-10-CM

## 2023-01-11 PROCEDURE — 73562 X-RAY EXAM OF KNEE 3: CPT

## 2023-01-11 PROCEDURE — 99214 OFFICE O/P EST MOD 30 MIN: CPT | Performed by: NURSE PRACTITIONER

## 2023-01-11 PROCEDURE — 73562 X-RAY EXAM OF KNEE 3: CPT | Performed by: RADIOLOGY

## 2023-01-11 RX ORDER — TIZANIDINE 4 MG/1
4 TABLET ORAL 3 TIMES DAILY PRN
Qty: 90 TABLET | Refills: 0 | Status: SHIPPED | OUTPATIENT
Start: 2023-01-11

## 2023-01-11 RX ORDER — ATOGEPANT 10 MG/1
10 TABLET ORAL DAILY
Qty: 30 TABLET | Refills: 0 | Status: SHIPPED | OUTPATIENT
Start: 2023-01-11

## 2023-01-11 ASSESSMENT — ENCOUNTER SYMPTOMS
ALLERGIC/IMMUNOLOGIC NEGATIVE: 1
EYES NEGATIVE: 1
RESPIRATORY NEGATIVE: 1
GASTROINTESTINAL NEGATIVE: 1
BACK PAIN: 1

## 2023-01-11 ASSESSMENT — PATIENT HEALTH QUESTIONNAIRE - PHQ9
SUM OF ALL RESPONSES TO PHQ QUESTIONS 1-9: 0
SUM OF ALL RESPONSES TO PHQ QUESTIONS 1-9: 0
2. FEELING DOWN, DEPRESSED OR HOPELESS: 0
SUM OF ALL RESPONSES TO PHQ QUESTIONS 1-9: 0
SUM OF ALL RESPONSES TO PHQ9 QUESTIONS 1 & 2: 0
1. LITTLE INTEREST OR PLEASURE IN DOING THINGS: 0
SUM OF ALL RESPONSES TO PHQ QUESTIONS 1-9: 0

## 2023-01-11 NOTE — PROGRESS NOTES
[address] [facility]  [address] [address] [place] [address]  Dept: [phone]  Dept Fax: [phone]  Loc: [phone]    Sawyer Weiss is a 29 y.o. female who presents today for her medical conditions/complaints as noted below. Sawyer Weiss is c/o of Follow-up        HPI:   She presents for follow up on weight gain and insomnia. She is down few pounds over the last week. States she is down to one soda a day, decreased her portion sizes. She reports migraines. She reports pain behind her eyes and travels up her head. States she has taken Maxalt and Imitrex with no relief. States she has been taking two Tylenol and three Ibuprofen, to help her function. She states when she has these headaches, she does not want to do anything. She tried Melatonin. States she is taking two 5mg and it is working. Scheduled for gallbladder surgery on 23. Has appointment with GI doctor 23. She reports she was told at age 15 she needed double knee replacement. She states she has \"no cartilage\". She has had physical therapy in the past.  She is taking Ibuprofen and or Tylenol with no relief. She has had TENS unit therapy to knees. States she has worn braces in the past \"because my knees will pop out of place\". States she exercises the knees. Workers compensation with [employer] for lumbar strain. Physical therapy and muscle relaxers. Workers comp has released her.   HPI   Chief Complaint   Patient presents with    Follow-up     Past Medical History:   Diagnosis Date    Anxiety     Headache       Past Surgical History:   Procedure Laterality Date     SECTION      MYRINGOTOMY W/ TUBES      TUBAL LIGATION         Vitals 2023    SYSTOLIC 213 597 - - 685   DIASTOLIC 65 84 - - 88   Site Left Upper Arm - - - -   Pulse 82 96 - 80 99   Temp - 97.9 - 97.6 98.2   SpO2 98 99 - 99 99   Weight 310 lb 311 lb 12.8 oz 317 lb 314 lb 307 lb 3.2 oz   Height 5' 8\" - - 5' 8\" 5' 8\"   Body mass index 47.13 kg/m2 - - 47.74 kg/m2 46.7 kg/m2       Family History   Problem Relation Age of Onset    High Blood Pressure Mother     Heart Disease Father     High Blood Pressure Father     Cancer Paternal Grandmother     Colon Polyps Neg Hx     Colon Cancer Neg Hx        Social History     Tobacco Use    Smoking status: Former     Packs/day: 0.50     Types: Cigarettes     Quit date: 2023     Years since quittin.0    Smokeless tobacco: Never   Substance Use Topics    Alcohol use: Not Currently      Current Outpatient Medications on File Prior to Visit   Medication Sig Dispense Refill    busPIRone (BUSPAR) 15 MG tablet Take 15 mg by mouth 3 times daily      lamoTRIgine (LAMICTAL) 100 MG tablet Take 100 mg by mouth daily      ibuprofen (ADVIL;MOTRIN) 800 MG tablet Take 800 mg by mouth every 6 hours as needed       No current facility-administered medications on file prior to visit. Allergies   Allergen Reactions    Morphine Other (See Comments)     Pt states it makes her feel like her body is on fire        Health Maintenance   Topic Date Due    Varicella vaccine (1 of 2 - 2-dose childhood series) Never done    HIV screen  Never done    Hepatitis C screen  Never done    Cervical cancer screen  Never done    Flu vaccine (1) Never done    COVID-19 Vaccine (1) 2024 (Originally 1992)    Depression Screen  2024    DTaP/Tdap/Td vaccine (3 - Td or Tdap) 2028    Hepatitis A vaccine  Aged Out    Hib vaccine  Aged Out    Meningococcal (ACWY) vaccine  Aged Out    Pneumococcal 0-64 years Vaccine  Aged Out       Subjective   SUBJECTIVE/OBJECTIVE:  @HPI@    Review of Systems   Constitutional:  Positive for unexpected weight change. HENT: Negative. Eyes: Negative. Visual disturbance: weight gain. Respiratory: Negative. Cardiovascular: Negative. Gastrointestinal: Negative. Endocrine: Negative. Genitourinary: Negative. Musculoskeletal:  Positive for back pain. Knee pain and grinding     Skin: Negative. Allergic/Immunologic: Negative. Neurological: Negative. Hematological: Negative. Psychiatric/Behavioral: Negative. Objective   Physical Exam  Vitals and nursing note reviewed. Constitutional:       Appearance: Normal appearance. She is obese. HENT:      Head: Normocephalic. Nose: Nose normal.   Cardiovascular:      Rate and Rhythm: Normal rate and regular rhythm. Pulses: Normal pulses. Heart sounds: Normal heart sounds. Pulmonary:      Effort: Pulmonary effort is normal.      Breath sounds: Normal breath sounds. No wheezing or rhonchi. Abdominal:      General: Abdomen is flat. Bowel sounds are normal.      Palpations: Abdomen is soft. Tenderness: There is no abdominal tenderness. Musculoskeletal:         General: Normal range of motion. Cervical back: Normal range of motion. Right knee: Crepitus present. Tenderness present over the medial joint line. Left knee: Crepitus present. Tenderness present over the medial joint line. Skin:     General: Skin is warm and dry. Neurological:      Mental Status: She is alert and oriented to person, place, and time. Psychiatric:         Mood and Affect: Mood normal.         Behavior: Behavior normal.          ASSESSMENT/PLAN:  1. Chronic pain of left knee  -     XR KNEE LEFT (3 VIEWS); Future  2. Crepitus of joint of left knee  -     XR KNEE LEFT (3 VIEWS); Future  3. Chronic pain of right knee  -     XR KNEE RIGHT (3 VIEWS); Future  4. Crepitus of joint of right knee  -     XR KNEE RIGHT (3 VIEWS); Future  5. Lumbar sprain, initial encounter  6. Migraine without status migrainosus, not intractable, unspecified migraine type  -     Atogepant (QULIPTA) 10 MG TABS; Take 10 mg by mouth daily, Disp-30 tablet, R-0Normal    Return in about 1 month (around 2/11/2023) for follow up with PCP. More than 50% of the time was spent counseling and coordinating care for a total time of 25-30min face to face. Xrays of both knees, will consider MRI  Start Quilipta 10mg daily  Zanaflex 4mg tid prn muscle spasm/lumbar  Advised to continue home exercises  Follow up in one month  PDMP Monitoring:    Last PDMP [place] as Reviewed:  Review User Review Instant Review Result            Urine Drug Screenings (1 yr)    No resulted procedures found. Medication Contract and Consent for Opioid Use Documents Filed        No documents found                     Patient given educational materials -see patient instructions. Discussed use, benefit, and side effects of prescribed medications. All patient questions answered. Pt voiced understanding. Reviewed health maintenance. Instructed to continue currentmedications, diet and exercise. Patient agreed with treatment plan. Follow up as directed. MEDICATIONS:  Orders Placed This Encounter   Medications    Atogepant (QULIPTA) 10 MG TABS     Sig: Take 10 mg by mouth daily     Dispense:  30 tablet     Refill:  0    tiZANidine (ZANAFLEX) 4 MG tablet     Sig: Take 1 tablet by mouth 3 times daily as needed (muscle spasm/lumbar)     Dispense:  90 tablet     Refill:  0           ORDERS:  Orders Placed This Encounter   Procedures    XR KNEE LEFT (3 VIEWS)    XR KNEE RIGHT (3 VIEWS)         Follow-up:  Return in about 1 month (around 2/11/2023) for follow up with PCP. PATIENT INSTRUCTIONS:  Patient Instructions   Start Qulipta 10mg daily  Xray both knees, will call with results  Electronically signed by YINKA Álvarez on 1/15/2023 at 11:24 AM    EMR Dragon/transcription disclaimer:  Much of thisencounter note is electronic transcription/translation of spoken language to printed texts. The electronic translation of spoken language may be erroneous, or at times, nonsensical words or phrases may be inadvertentlytranscribed.   Although I have reviewed the note for such errors, some may still exist.

## 2023-01-12 ENCOUNTER — OFFICE VISIT (OUTPATIENT)
Dept: GASTROENTEROLOGY | Age: 33
End: 2023-01-12
Payer: MEDICAID

## 2023-01-12 VITALS
OXYGEN SATURATION: 98 % | WEIGHT: 293 LBS | HEART RATE: 82 BPM | DIASTOLIC BLOOD PRESSURE: 65 MMHG | BODY MASS INDEX: 44.41 KG/M2 | HEIGHT: 68 IN | SYSTOLIC BLOOD PRESSURE: 120 MMHG

## 2023-01-12 DIAGNOSIS — K21.9 GASTROESOPHAGEAL REFLUX DISEASE, UNSPECIFIED WHETHER ESOPHAGITIS PRESENT: Primary | ICD-10-CM

## 2023-01-12 DIAGNOSIS — R13.10 DYSPHAGIA, UNSPECIFIED TYPE: ICD-10-CM

## 2023-01-12 DIAGNOSIS — R19.7 DIARRHEA, UNSPECIFIED TYPE: ICD-10-CM

## 2023-01-12 PROCEDURE — 99204 OFFICE O/P NEW MOD 45 MIN: CPT | Performed by: NURSE PRACTITIONER

## 2023-01-12 RX ORDER — OMEPRAZOLE 20 MG/1
20 CAPSULE, DELAYED RELEASE ORAL
Qty: 90 CAPSULE | Refills: 1 | Status: SHIPPED | OUTPATIENT
Start: 2023-01-12

## 2023-01-12 ASSESSMENT — ENCOUNTER SYMPTOMS
DIARRHEA: 0
SHORTNESS OF BREATH: 0
CHOKING: 0
ABDOMINAL PAIN: 0
ABDOMINAL DISTENTION: 0
TROUBLE SWALLOWING: 0
NAUSEA: 0
VOMITING: 0
COUGH: 0
RECTAL PAIN: 0
ANAL BLEEDING: 0
BLOOD IN STOOL: 0
CONSTIPATION: 0

## 2023-01-12 NOTE — PATIENT INSTRUCTIONS
Schedule colonoscopy and endoscopy. Do not eat or drink after midnight the day of the procedure. Allowed medications can be taken with a small sip of water. Please review your prep instructions for allowed medications. You will not be able to drive for 24 hours after the procedure due to sedation. Must have a responsible adult, 18 years or older, accompany you to drive you home the day of your procedure. If you are on blood thinners, clearance from the prescribing physician will be obtained before your procedure is scheduled. If it is determined it is not safe to hold these medications for a short time an alternative procedure for evaluation may be recommended. No aspirin, ibuprofen, naproxen, fish oil or vitamin E for 5 days before procedure. Risks of colonoscopy and endoscopy include, but are not limited to, perforation, bleeding, and infection, Risk of perforation and bleeding are increased if there is a polyp removed or dilation completed. Anesthesia risks will be reviewed with you before the procedure by a member of the anesthesia department. Your physician may also schedule a follow up appointment with the nurse practitioner to discuss pathology, symptoms or to check if you have had any problems related to your procedure. If you prefer not to return to the office after your procedure please discuss this with your physician on the day of your colonoscopy. The physician will talk with you and/or your family after the procedure is completed. Final recommendations are based on the pathologist report if biopsies or specimens are taken. If polyps are removed during the procedure they will be sent to a pathologist for analysis. Unless you have a follow up appointment scheduled, you will be notified by mail of the pathology results within 4 weeks. If you have not received results after 4 weeks you may call the office to obtain this information. For Colonoscopy:   You will be given specific directions regarding restrictions to diet and bowel prep instructions including laxatives. Please read these instructions one week prior to your scheduled procedure to ensure that you are prepared. If you have any questions regarding these instructions please call our office Mon through Fri from 8:00 am to 4:00 pm.     Follow prep instructions provided for bowel prep. Take all of the bowel prep as directed. If you are having problems with nausea, stop your prep for 30-45 min to allow the nausea to subside before resuming your prep. It is important to drink plenty of fluids throughout the day before taking your laxatives. This will help to protect your kidneys, prevent dehydration and maximize the effect of the bowel prep. Your diet before a colonoscopy bowel preparation is very important to ensure a successful colon exam. It is recommended to consider certain changes to your diet three to four days prior to the procedure. Remember that your bowels need to be completely empty for the exam.    What foods are good to eat? Cut down on heavy solid foods three to four days before the procedure and start introducing lighter meals to your diet. The following food suggestions are a good part of your diet before a colonoscopy bowel preparation. Light meat that is easily digestible such as chicken (without the skin)   Potatoes without skin   Cheese   Eggs   A light meal of steamed white fish   Light clear soups    Foods and drinks to avoid  Avoid foods that contain too much fiber. Stay clear of dark colored beverages. They can stick to the walls of the digestive tract and make it difficult to differentiate from blood.  Some of these foods are:  Red meat, rice, nuts and vegetables   Milk, other milk based fluids and cream   Most fruit and puddings   Whole grain pasta   Cereals, bran and seeds   Colored beverages, especially those that are red or purple in color   Red colored Jell-O   On the day before the colonoscopy, continue to drink plenty of clear fluids. It is important   to keep yourself hydrated before the exam.     Please follow all instructions as provided for cleansing the bowel. Failure to have an adequately prepped colon may cause you to have incomplete exam with further testing required.      [email or web address]

## 2023-01-12 NOTE — PROGRESS NOTES
Subjective:     Patient ID: Debbi Pham is a 29 y.o. female  PCP: YINKA Godinez  Referring Provider: DO FORREST Lux  Patient presents to the office today with the following complaints: New Patient and Gastroesophageal Reflux  Patient seen in the office today related to issues with acid reflux. Reports she has episodes of regurgitation. Reports she has trouble swallowing some food and medication. 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 Assessment:     1. Gastroesophageal reflux disease, unspecified whether esophagitis present  2. Dysphagia, unspecified type  3. Diarrhea, unspecified type         Plan:   Schedule Colonoscopy and EGD  Instruct on bowel prep. Nothing to eat or drink after midnight the day of the exam.  Unable to drive for 24 hours after the procedure. No aspirin or nonsteroidal anti-inflammatories for 5 days before procedure. I have discussed the benefits, alternatives, and risks (including bleeding, perforation and death)  for pursuing Endoscopy (EGD/Colonscopy/EUS/ERCP) with the patient and they are willing to continue. We also discussed the need for anesthesia, IV access, proper dietary changes, medication changes if necessary, and need for bowel prep (if ordered) prior to their Endoscopic procedure. They are aware they must have someone accompany them to their scheduled procedure to drive them home - they agree to the above and are willing to continue. Orders  No orders of the defined types were placed in this encounter.     Medications  Orders Placed This Encounter   Medications    omeprazole (PRILOSEC) 20 MG delayed release capsule     Sig: Take 1 capsule by mouth every morning (before breakfast)     Dispense:  90 capsule     Refill:  1           Patient History:     Past Medical History:   Diagnosis Date    Anxiety Headache        Past Surgical History:   Procedure Laterality Date     SECTION      MYRINGOTOMY W/ TUBES      TUBAL LIGATION         Family History   Problem Relation Age of Onset    High Blood Pressure Mother     Heart Disease Father     High Blood Pressure Father     Cancer Paternal Grandmother     Colon Polyps Neg Hx     Colon Cancer Neg Hx        Social History     Socioeconomic History    Marital status: Single   Tobacco Use    Smoking status: Former     Packs/day: 0.50     Types: Cigarettes     Quit date: 2023     Years since quittin.0    Smokeless tobacco: Never   Vaping Use    Vaping Use: Every day    Substances: Nicotine   Substance and Sexual Activity    Alcohol use: Not Currently    Drug use: Not Currently     Social Determinants of Health     Financial Resource Strain: Medium Risk    Difficulty of Paying Living Expenses: Somewhat hard   Food Insecurity: No Food Insecurity    Worried About Running Out of Food in the Last Year: Never true    Ran Out of Food in the Last Year: Never true   Physical Activity: Insufficiently Active    Days of Exercise per Week: 3 days    Minutes of Exercise per Session: 30 min   Intimate Partner Violence: Not At Risk    Fear of Current or Ex-Partner: No    Emotionally Abused: No    Physically Abused: No    Sexually Abused: No       Current Outpatient Medications   Medication Sig Dispense Refill    omeprazole (PRILOSEC) 20 MG delayed release capsule Take 1 capsule by mouth every morning (before breakfast) 90 capsule 1    Atogepant (QULIPTA) 10 MG TABS Take 10 mg by mouth daily 30 tablet 0    tiZANidine (ZANAFLEX) 4 MG tablet Take 1 tablet by mouth 3 times daily as needed (muscle spasm/lumbar) 90 tablet 0    busPIRone (BUSPAR) 15 MG tablet Take 15 mg by mouth 3 times daily      lamoTRIgine (LAMICTAL) 100 MG tablet Take 100 mg by mouth daily      ibuprofen (ADVIL;MOTRIN) 800 MG tablet Take 800 mg by mouth every 6 hours as needed       No current facility-administered medications for this visit. Allergies   Allergen Reactions    Morphine Other (See Comments)     Pt states it makes her feel like her body is on fire        Review of Systems   Constitutional:  Negative for activity change, appetite change, fatigue, fever and unexpected weight change. HENT:  Negative for trouble swallowing. Respiratory:  Negative for cough, choking and shortness of breath. Cardiovascular:  Negative for chest pain. Gastrointestinal:  Negative for abdominal distention, abdominal pain, anal bleeding, blood in stool, constipation, diarrhea, nausea, rectal pain and vomiting. Allergic/Immunologic: Negative for food allergies. All other systems reviewed and are negative. Objective:     /65 (Site: Left Upper Arm)   Pulse 82   Ht 5' 8\" (1.727 m)   Wt (!) 310 lb (140.6 kg)   SpO2 98%   BMI 47.14 kg/m²     Physical Exam  Vitals reviewed. Constitutional:       General: She is not in acute distress. Appearance: She is well-developed. HENT:      Head: Normocephalic and atraumatic. Right Ear: External ear normal.      Left Ear: External ear normal.      Nose: Nose normal.   Eyes:      General: No scleral icterus. Right eye: No discharge. Left eye: No discharge. Conjunctiva/sclera: Conjunctivae normal.      Pupils: Pupils are equal, round, and reactive to light. Cardiovascular:      Rate and Rhythm: Normal rate and regular rhythm. Heart sounds: Normal heart sounds. No murmur heard. Pulmonary:      Effort: Pulmonary effort is normal. No respiratory distress. Breath sounds: Normal breath sounds. No wheezing or rales. Abdominal:      General: Bowel sounds are normal. There is no distension. Palpations: Abdomen is soft. There is no mass. Tenderness: There is no abdominal tenderness. There is no guarding or rebound. Musculoskeletal:         General: Normal range of motion.       Cervical back: Normal range of motion and neck supple. Skin:     General: Skin is warm and dry. Coloration: Skin is not pale. Neurological:      Mental Status: She is alert and oriented to person, place, and time.    Psychiatric:         Behavior: Behavior normal.

## 2023-01-16 ENCOUNTER — TELEPHONE (OUTPATIENT)
Dept: SURGERY | Age: 33
End: 2023-01-16

## 2023-01-16 NOTE — TELEPHONE ENCOUNTER
1/16/23 Patient called and stated she is scheduled for surgery on 1/20/23 and is needing a letter to turn in to office of the surgery date and the amount of time she is expected to be off.     Callback # [phone]  stacie

## 2023-01-17 ENCOUNTER — TELEPHONE (OUTPATIENT)
Dept: PRIMARY CARE CLINIC | Age: 33
End: 2023-01-17

## 2023-01-17 DIAGNOSIS — R19.7 DIARRHEA, UNSPECIFIED TYPE: ICD-10-CM

## 2023-01-17 LAB
ADENOVIRUS F 40 41 PCR: NOT DETECTED
ASTROVIRUS PCR: NOT DETECTED
CAMPYLOBACTER PCR: NOT DETECTED
CLOSTRIDIUM DIFFICILE, PCR: NOT DETECTED
CRYPTOSPORIDIUM PCR: NOT DETECTED
CYCLOSPORA CAYETANENSIS PCR: NOT DETECTED
E COLI ENTEROAGGREGATIVE PCR: NOT DETECTED
E COLI ENTEROPATHOGENIC PCR: NOT DETECTED
E COLI ENTEROTOXIGENIC PCR: NOT DETECTED
E COLI SHIGELLA/ENTEROINVASIVE PCR: NOT DETECTED
ENTAMOEBA HISTOLYTICA PCR: NOT DETECTED
GIARDIA LAMBLIA PCR: NOT DETECTED
NOROVIRUS GI GII PCR: NOT DETECTED
PLESIOMONAS SHIGELLOIDES PCR: NOT DETECTED
ROTAVIRUS A PCR: NOT DETECTED
SALMONELLA PCR: NOT DETECTED
SAPOVIRUS PCR: NOT DETECTED
SHIGA-LIKE TOXIN-PRODUCING E. COLI (STEC) STX1/STX2: NOT DETECTED
VIBRIO CHOLERAE PCR: NOT DETECTED
VIBRIO PCR: NOT DETECTED
YERSINIA ENTEROCOLITICA PCR: NOT DETECTED

## 2023-01-17 NOTE — TELEPHONE ENCOUNTER
Patient is scheduled for gallbladder removal on 1/20/23.  Do you want me to do a letter saying she's to be excused from work 2 weeks? (1/20/23-2/3/23) Forwarding to Dr Demi Fuentes

## 2023-01-18 ENCOUNTER — TELEPHONE (OUTPATIENT)
Dept: GASTROENTEROLOGY | Age: 33
End: 2023-01-18

## 2023-01-20 ENCOUNTER — HOSPITAL ENCOUNTER (OUTPATIENT)
Age: 33
Setting detail: OUTPATIENT SURGERY
Discharge: HOME | End: 2023-01-20
Attending: SURGERY | Admitting: SURGERY
Payer: MEDICAID

## 2023-01-20 ENCOUNTER — APPOINTMENT (OUTPATIENT)
Dept: CT IMAGING | Age: 33
End: 2023-01-20
Payer: MEDICAID

## 2023-01-20 ENCOUNTER — HOSPITAL ENCOUNTER (OUTPATIENT)
Age: 33
Setting detail: OBSERVATION
Discharge: HOME | End: 2023-01-21
Attending: EMERGENCY MEDICINE | Admitting: SURGERY
Payer: MEDICAID

## 2023-01-20 ENCOUNTER — ANESTHESIA EVENT (OUTPATIENT)
Dept: OPERATING ROOM | Age: 33
End: 2023-01-20
Payer: MEDICAID

## 2023-01-20 ENCOUNTER — ANESTHESIA (OUTPATIENT)
Dept: OPERATING ROOM | Age: 33
End: 2023-01-20
Payer: MEDICAID

## 2023-01-20 VITALS
HEART RATE: 78 BPM | DIASTOLIC BLOOD PRESSURE: 87 MMHG | WEIGHT: 293 LBS | RESPIRATION RATE: 18 BRPM | HEIGHT: 68 IN | SYSTOLIC BLOOD PRESSURE: 131 MMHG | TEMPERATURE: 97 F | BODY MASS INDEX: 44.41 KG/M2 | OXYGEN SATURATION: 95 %

## 2023-01-20 DIAGNOSIS — R52 INTRACTABLE PAIN: ICD-10-CM

## 2023-01-20 DIAGNOSIS — K80.10 CALCULUS OF GALLBLADDER WITH CHRONIC CHOLECYSTITIS WITHOUT OBSTRUCTION: Primary | ICD-10-CM

## 2023-01-20 DIAGNOSIS — G89.18 ACUTE POST-OPERATIVE PAIN: Primary | ICD-10-CM

## 2023-01-20 LAB
ALBUMIN SERPL-MCNC: 3.3 G/DL (ref 3.5–5.2)
ALP BLD-CCNC: 99 U/L (ref 35–104)
ALT SERPL-CCNC: 38 U/L (ref 5–33)
ANION GAP SERPL CALCULATED.3IONS-SCNC: 10 MMOL/L (ref 7–19)
AST SERPL-CCNC: 34 U/L (ref 5–32)
BACTERIA: ABNORMAL /HPF
BASOPHILS ABSOLUTE: 0 K/UL (ref 0–0.2)
BASOPHILS RELATIVE PERCENT: 0.2 % (ref 0–1)
BILIRUB SERPL-MCNC: <0.2 MG/DL (ref 0.2–1.2)
BILIRUBIN URINE: NEGATIVE
BLOOD, URINE: ABNORMAL
BUN BLDV-MCNC: 11 MG/DL (ref 6–20)
CALCIUM SERPL-MCNC: 8.7 MG/DL (ref 8.6–10)
CHLORIDE BLD-SCNC: 106 MMOL/L (ref 98–111)
CLARITY: CLEAR
CO2: 23 MMOL/L (ref 22–29)
COLOR: YELLOW
CREAT SERPL-MCNC: 0.6 MG/DL (ref 0.5–0.9)
EOSINOPHILS ABSOLUTE: 0 K/UL (ref 0–0.6)
EOSINOPHILS RELATIVE PERCENT: 0 % (ref 0–5)
EPITHELIAL CELLS, UA: ABNORMAL /HPF
GFR SERPL CREATININE-BSD FRML MDRD: >60 ML/MIN/{1.73_M2}
GLUCOSE BLD-MCNC: 133 MG/DL (ref 74–109)
GLUCOSE URINE: NEGATIVE MG/DL
HCG QUALITATIVE: NEGATIVE
HCG(URINE) PREGNANCY TEST: NEGATIVE
HCT VFR BLD CALC: 40 % (ref 37–47)
HEMOGLOBIN: 12.9 G/DL (ref 12–16)
IMMATURE GRANULOCYTES #: 0.1 K/UL
KETONES, URINE: NEGATIVE MG/DL
LEUKOCYTE ESTERASE, URINE: ABNORMAL
LIPASE: 16 U/L (ref 13–60)
LYMPHOCYTES ABSOLUTE: 1.2 K/UL (ref 1.1–4.5)
LYMPHOCYTES RELATIVE PERCENT: 9.6 % (ref 20–40)
MCH RBC QN AUTO: 27.8 PG (ref 27–31)
MCHC RBC AUTO-ENTMCNC: 32.3 G/DL (ref 33–37)
MCV RBC AUTO: 86.2 FL (ref 81–99)
MONOCYTES ABSOLUTE: 0.3 K/UL (ref 0–0.9)
MONOCYTES RELATIVE PERCENT: 1.9 % (ref 0–10)
MUCUS: ABNORMAL /LPF
NEUTROPHILS ABSOLUTE: 11.4 K/UL (ref 1.5–7.5)
NEUTROPHILS RELATIVE PERCENT: 87.9 % (ref 50–65)
NITRITE, URINE: NEGATIVE
PDW BLD-RTO: 13.9 % (ref 11.5–14.5)
PH UA: 7 (ref 5–8)
PLATELET # BLD: 377 K/UL (ref 130–400)
PMV BLD AUTO: 10.3 FL (ref 9.4–12.3)
POTASSIUM SERPL-SCNC: 3.9 MMOL/L (ref 3.5–5)
PROTEIN UA: NEGATIVE MG/DL
RBC # BLD: 4.64 M/UL (ref 4.2–5.4)
RBC UA: ABNORMAL /HPF (ref 0–2)
SODIUM BLD-SCNC: 139 MMOL/L (ref 136–145)
SPECIFIC GRAVITY UA: 1.01 (ref 1–1.03)
TOTAL PROTEIN: 6.1 G/DL (ref 6.6–8.7)
UROBILINOGEN, URINE: 0.2 E.U./DL
WBC # BLD: 12.9 K/UL (ref 4.8–10.8)
WBC UA: ABNORMAL /HPF (ref 0–5)

## 2023-01-20 PROCEDURE — 6360000002 HC RX W HCPCS: Performed by: SURGERY

## 2023-01-20 PROCEDURE — 6360000002 HC RX W HCPCS: Performed by: ANESTHESIOLOGY

## 2023-01-20 PROCEDURE — 81001 URINALYSIS AUTO W/SCOPE: CPT

## 2023-01-20 PROCEDURE — 2500000003 HC RX 250 WO HCPCS: Performed by: NURSE ANESTHETIST, CERTIFIED REGISTERED

## 2023-01-20 PROCEDURE — 7100000000 HC PACU RECOVERY - FIRST 15 MIN: Performed by: SURGERY

## 2023-01-20 PROCEDURE — 96375 TX/PRO/DX INJ NEW DRUG ADDON: CPT

## 2023-01-20 PROCEDURE — 2580000003 HC RX 258: Performed by: SURGERY

## 2023-01-20 PROCEDURE — 7100000010 HC PHASE II RECOVERY - FIRST 15 MIN: Performed by: SURGERY

## 2023-01-20 PROCEDURE — 3600000009 HC SURGERY ROBOT BASE: Performed by: SURGERY

## 2023-01-20 PROCEDURE — 96374 THER/PROPH/DIAG INJ IV PUSH: CPT

## 2023-01-20 PROCEDURE — 7100000011 HC PHASE II RECOVERY - ADDTL 15 MIN: Performed by: SURGERY

## 2023-01-20 PROCEDURE — 36415 COLL VENOUS BLD VENIPUNCTURE: CPT

## 2023-01-20 PROCEDURE — 83690 ASSAY OF LIPASE: CPT

## 2023-01-20 PROCEDURE — 3600000019 HC SURGERY ROBOT ADDTL 15MIN: Performed by: SURGERY

## 2023-01-20 PROCEDURE — 3700000001 HC ADD 15 MINUTES (ANESTHESIA): Performed by: SURGERY

## 2023-01-20 PROCEDURE — 96376 TX/PRO/DX INJ SAME DRUG ADON: CPT

## 2023-01-20 PROCEDURE — 6360000004 HC RX CONTRAST MEDICATION: Performed by: EMERGENCY MEDICINE

## 2023-01-20 PROCEDURE — 6370000000 HC RX 637 (ALT 250 FOR IP): Performed by: SURGERY

## 2023-01-20 PROCEDURE — 2580000003 HC RX 258: Performed by: EMERGENCY MEDICINE

## 2023-01-20 PROCEDURE — 74177 CT ABD & PELVIS W/CONTRAST: CPT

## 2023-01-20 PROCEDURE — 84703 CHORIONIC GONADOTROPIN ASSAY: CPT

## 2023-01-20 PROCEDURE — 2709999900 HC NON-CHARGEABLE SUPPLY: Performed by: SURGERY

## 2023-01-20 PROCEDURE — C1889 IMPLANT/INSERT DEVICE, NOC: HCPCS | Performed by: SURGERY

## 2023-01-20 PROCEDURE — C9290 INJ, BUPIVACAINE LIPOSOME: HCPCS | Performed by: SURGERY

## 2023-01-20 PROCEDURE — 2500000003 HC RX 250 WO HCPCS: Performed by: SURGERY

## 2023-01-20 PROCEDURE — 47562 LAPAROSCOPIC CHOLECYSTECTOMY: CPT | Performed by: SURGERY

## 2023-01-20 PROCEDURE — 80053 COMPREHEN METABOLIC PANEL: CPT

## 2023-01-20 PROCEDURE — 85025 COMPLETE CBC W/AUTO DIFF WBC: CPT

## 2023-01-20 PROCEDURE — 88304 TISSUE EXAM BY PATHOLOGIST: CPT

## 2023-01-20 PROCEDURE — 74177 CT ABD & PELVIS W/CONTRAST: CPT | Performed by: RADIOLOGY

## 2023-01-20 PROCEDURE — 7100000001 HC PACU RECOVERY - ADDTL 15 MIN: Performed by: SURGERY

## 2023-01-20 PROCEDURE — S2900 ROBOTIC SURGICAL SYSTEM: HCPCS | Performed by: SURGERY

## 2023-01-20 PROCEDURE — 3700000000 HC ANESTHESIA ATTENDED CARE: Performed by: SURGERY

## 2023-01-20 PROCEDURE — 2720000010 HC SURG SUPPLY STERILE: Performed by: SURGERY

## 2023-01-20 PROCEDURE — 6360000002 HC RX W HCPCS: Performed by: EMERGENCY MEDICINE

## 2023-01-20 PROCEDURE — 6360000002 HC RX W HCPCS: Performed by: NURSE ANESTHETIST, CERTIFIED REGISTERED

## 2023-01-20 DEVICE — CLIP INT L POLYMER LOK LIG HEM O LOK: Type: IMPLANTABLE DEVICE | Site: ABDOMEN | Status: FUNCTIONAL

## 2023-01-20 RX ORDER — ONDANSETRON 2 MG/ML
4 INJECTION INTRAMUSCULAR; INTRAVENOUS ONCE
Status: COMPLETED | OUTPATIENT
Start: 2023-01-20 | End: 2023-01-20

## 2023-01-20 RX ORDER — BUPIVACAINE HYDROCHLORIDE 2.5 MG/ML
INJECTION, SOLUTION INFILTRATION; PERINEURAL PRN
Status: DISCONTINUED | OUTPATIENT
Start: 2023-01-20 | End: 2023-01-20 | Stop reason: ALTCHOICE

## 2023-01-20 RX ORDER — INDOCYANINE GREEN AND WATER 25 MG
KIT INJECTION PRN
Status: DISCONTINUED | OUTPATIENT
Start: 2023-01-20 | End: 2023-01-20 | Stop reason: ALTCHOICE

## 2023-01-20 RX ORDER — 0.9 % SODIUM CHLORIDE 0.9 %
1000 INTRAVENOUS SOLUTION INTRAVENOUS ONCE
Status: COMPLETED | OUTPATIENT
Start: 2023-01-20 | End: 2023-01-20

## 2023-01-20 RX ORDER — GLYCOPYRROLATE 1 MG/5 ML
SYRINGE (ML) INTRAVENOUS PRN
Status: DISCONTINUED | OUTPATIENT
Start: 2023-01-20 | End: 2023-01-20 | Stop reason: SDUPTHER

## 2023-01-20 RX ORDER — DEXAMETHASONE SODIUM PHOSPHATE 10 MG/ML
8 INJECTION, SOLUTION INTRAMUSCULAR; INTRAVENOUS ONCE
Status: COMPLETED | OUTPATIENT
Start: 2023-01-20 | End: 2023-01-20

## 2023-01-20 RX ORDER — DEXAMETHASONE SODIUM PHOSPHATE 10 MG/ML
INJECTION, SOLUTION INTRAMUSCULAR; INTRAVENOUS PRN
Status: DISCONTINUED | OUTPATIENT
Start: 2023-01-20 | End: 2023-01-20 | Stop reason: SDUPTHER

## 2023-01-20 RX ORDER — LORAZEPAM 2 MG/ML
0.5 INJECTION INTRAMUSCULAR
Status: DISCONTINUED | OUTPATIENT
Start: 2023-01-20 | End: 2023-01-20 | Stop reason: HOSPADM

## 2023-01-20 RX ORDER — SUCCINYLCHOLINE/SOD CL,ISO/PF 100 MG/5ML
SYRINGE (ML) INTRAVENOUS PRN
Status: DISCONTINUED | OUTPATIENT
Start: 2023-01-20 | End: 2023-01-20 | Stop reason: SDUPTHER

## 2023-01-20 RX ORDER — SODIUM CHLORIDE 0.9 % (FLUSH) 0.9 %
5-40 SYRINGE (ML) INJECTION EVERY 12 HOURS SCHEDULED
Status: DISCONTINUED | OUTPATIENT
Start: 2023-01-20 | End: 2023-01-20 | Stop reason: HOSPADM

## 2023-01-20 RX ORDER — KETOROLAC TROMETHAMINE 30 MG/ML
INJECTION, SOLUTION INTRAMUSCULAR; INTRAVENOUS PRN
Status: DISCONTINUED | OUTPATIENT
Start: 2023-01-20 | End: 2023-01-20 | Stop reason: SDUPTHER

## 2023-01-20 RX ORDER — FENTANYL CITRATE 50 UG/ML
INJECTION, SOLUTION INTRAMUSCULAR; INTRAVENOUS PRN
Status: DISCONTINUED | OUTPATIENT
Start: 2023-01-20 | End: 2023-01-20 | Stop reason: SDUPTHER

## 2023-01-20 RX ORDER — HYDROMORPHONE HYDROCHLORIDE 1 MG/ML
1 INJECTION, SOLUTION INTRAMUSCULAR; INTRAVENOUS; SUBCUTANEOUS ONCE
Status: COMPLETED | OUTPATIENT
Start: 2023-01-20 | End: 2023-01-20

## 2023-01-20 RX ORDER — PROPOFOL 10 MG/ML
INJECTION, EMULSION INTRAVENOUS PRN
Status: DISCONTINUED | OUTPATIENT
Start: 2023-01-20 | End: 2023-01-20 | Stop reason: SDUPTHER

## 2023-01-20 RX ORDER — LIDOCAINE HYDROCHLORIDE 10 MG/ML
INJECTION, SOLUTION INFILTRATION; PERINEURAL PRN
Status: DISCONTINUED | OUTPATIENT
Start: 2023-01-20 | End: 2023-01-20 | Stop reason: SDUPTHER

## 2023-01-20 RX ORDER — ACETAMINOPHEN 500 MG
1000 TABLET ORAL ONCE
Status: COMPLETED | OUTPATIENT
Start: 2023-01-20 | End: 2023-01-20

## 2023-01-20 RX ORDER — FENTANYL CITRATE 50 UG/ML
50 INJECTION, SOLUTION INTRAMUSCULAR; INTRAVENOUS
Status: DISCONTINUED | OUTPATIENT
Start: 2023-01-20 | End: 2023-01-20 | Stop reason: HOSPADM

## 2023-01-20 RX ORDER — LABETALOL HYDROCHLORIDE 5 MG/ML
10 INJECTION, SOLUTION INTRAVENOUS
Status: DISCONTINUED | OUTPATIENT
Start: 2023-01-20 | End: 2023-01-20 | Stop reason: HOSPADM

## 2023-01-20 RX ORDER — ONDANSETRON 2 MG/ML
INJECTION INTRAMUSCULAR; INTRAVENOUS PRN
Status: DISCONTINUED | OUTPATIENT
Start: 2023-01-20 | End: 2023-01-20 | Stop reason: SDUPTHER

## 2023-01-20 RX ORDER — OXYCODONE HYDROCHLORIDE AND ACETAMINOPHEN 5; 325 MG/1; MG/1
1 TABLET ORAL EVERY 6 HOURS PRN
Qty: 12 TABLET | Refills: 0 | Status: SHIPPED | OUTPATIENT
Start: 2023-01-20 | End: 2023-01-23

## 2023-01-20 RX ORDER — ROCURONIUM BROMIDE 10 MG/ML
INJECTION, SOLUTION INTRAVENOUS PRN
Status: DISCONTINUED | OUTPATIENT
Start: 2023-01-20 | End: 2023-01-20 | Stop reason: SDUPTHER

## 2023-01-20 RX ORDER — MIDAZOLAM HYDROCHLORIDE 1 MG/ML
INJECTION INTRAMUSCULAR; INTRAVENOUS PRN
Status: DISCONTINUED | OUTPATIENT
Start: 2023-01-20 | End: 2023-01-20 | Stop reason: SDUPTHER

## 2023-01-20 RX ORDER — CELECOXIB 200 MG/1
200 CAPSULE ORAL ONCE
Status: COMPLETED | OUTPATIENT
Start: 2023-01-20 | End: 2023-01-20

## 2023-01-20 RX ORDER — OXYCODONE HYDROCHLORIDE AND ACETAMINOPHEN 5; 325 MG/1; MG/1
1 TABLET ORAL
Status: COMPLETED | OUTPATIENT
Start: 2023-01-20 | End: 2023-01-20

## 2023-01-20 RX ORDER — SODIUM CHLORIDE, SODIUM LACTATE, POTASSIUM CHLORIDE, CALCIUM CHLORIDE 600; 310; 30; 20 MG/100ML; MG/100ML; MG/100ML; MG/100ML
INJECTION, SOLUTION INTRAVENOUS CONTINUOUS
Status: DISCONTINUED | OUTPATIENT
Start: 2023-01-20 | End: 2023-01-20 | Stop reason: HOSPADM

## 2023-01-20 RX ORDER — SODIUM CHLORIDE 0.9 % (FLUSH) 0.9 %
5-40 SYRINGE (ML) INJECTION PRN
Status: DISCONTINUED | OUTPATIENT
Start: 2023-01-20 | End: 2023-01-20 | Stop reason: HOSPADM

## 2023-01-20 RX ORDER — SODIUM CHLORIDE 9 MG/ML
INJECTION, SOLUTION INTRAVENOUS PRN
Status: DISCONTINUED | OUTPATIENT
Start: 2023-01-20 | End: 2023-01-20 | Stop reason: HOSPADM

## 2023-01-20 RX ADMIN — IOPAMIDOL 90 ML: 755 INJECTION, SOLUTION INTRAVENOUS at 22:14

## 2023-01-20 RX ADMIN — ONDANSETRON 4 MG: 2 INJECTION INTRAMUSCULAR; INTRAVENOUS at 11:28

## 2023-01-20 RX ADMIN — ROCURONIUM BROMIDE 15 MG: 10 INJECTION, SOLUTION INTRAVENOUS at 11:07

## 2023-01-20 RX ADMIN — Medication 0.2 MG: at 10:05

## 2023-01-20 RX ADMIN — DEXAMETHASONE SODIUM PHOSPHATE 8 MG: 10 INJECTION, SOLUTION INTRAMUSCULAR; INTRAVENOUS at 08:23

## 2023-01-20 RX ADMIN — SODIUM CHLORIDE, SODIUM LACTATE, POTASSIUM CHLORIDE, AND CALCIUM CHLORIDE: 600; 310; 30; 20 INJECTION, SOLUTION INTRAVENOUS at 11:10

## 2023-01-20 RX ADMIN — FENTANYL CITRATE 150 MCG: 50 INJECTION, SOLUTION INTRAMUSCULAR; INTRAVENOUS at 10:05

## 2023-01-20 RX ADMIN — HYDROMORPHONE HYDROCHLORIDE 1 MG: 1 INJECTION, SOLUTION INTRAMUSCULAR; INTRAVENOUS; SUBCUTANEOUS at 20:38

## 2023-01-20 RX ADMIN — ROCURONIUM BROMIDE 40 MG: 10 INJECTION, SOLUTION INTRAVENOUS at 10:20

## 2023-01-20 RX ADMIN — KETOROLAC TROMETHAMINE 30 MG: 30 INJECTION, SOLUTION INTRAMUSCULAR; INTRAVENOUS at 11:47

## 2023-01-20 RX ADMIN — LIDOCAINE HYDROCHLORIDE 50 MG: 10 INJECTION, SOLUTION INFILTRATION; PERINEURAL at 10:05

## 2023-01-20 RX ADMIN — ROCURONIUM BROMIDE 10 MG: 10 INJECTION, SOLUTION INTRAVENOUS at 10:05

## 2023-01-20 RX ADMIN — DEXAMETHASONE SODIUM PHOSPHATE 8 MG: 10 INJECTION, SOLUTION INTRAMUSCULAR; INTRAVENOUS at 10:05

## 2023-01-20 RX ADMIN — MIDAZOLAM 2 MG: 1 INJECTION INTRAMUSCULAR; INTRAVENOUS at 10:03

## 2023-01-20 RX ADMIN — FENTANYL CITRATE 100 MCG: 50 INJECTION, SOLUTION INTRAMUSCULAR; INTRAVENOUS at 10:21

## 2023-01-20 RX ADMIN — CELECOXIB 200 MG: 200 CAPSULE ORAL at 08:23

## 2023-01-20 RX ADMIN — SUGAMMADEX 200 MG: 100 INJECTION, SOLUTION INTRAVENOUS at 11:53

## 2023-01-20 RX ADMIN — ONDANSETRON 4 MG: 2 INJECTION INTRAMUSCULAR; INTRAVENOUS at 20:38

## 2023-01-20 RX ADMIN — Medication 100 MG: at 10:05

## 2023-01-20 RX ADMIN — FENTANYL CITRATE 50 MCG: 50 INJECTION INTRAMUSCULAR; INTRAVENOUS at 12:18

## 2023-01-20 RX ADMIN — Medication 3000 MG: at 10:12

## 2023-01-20 RX ADMIN — OXYCODONE HYDROCHLORIDE AND ACETAMINOPHEN 1 TABLET: 5; 325 TABLET ORAL at 12:59

## 2023-01-20 RX ADMIN — ACETAMINOPHEN 1000 MG: 500 TABLET ORAL at 08:23

## 2023-01-20 RX ADMIN — HYDROMORPHONE HYDROCHLORIDE 1 MG: 1 INJECTION, SOLUTION INTRAMUSCULAR; INTRAVENOUS; SUBCUTANEOUS at 22:48

## 2023-01-20 RX ADMIN — SODIUM CHLORIDE, SODIUM LACTATE, POTASSIUM CHLORIDE, AND CALCIUM CHLORIDE: 600; 310; 30; 20 INJECTION, SOLUTION INTRAVENOUS at 08:18

## 2023-01-20 RX ADMIN — PROPOFOL 200 MG: 10 INJECTION, EMULSION INTRAVENOUS at 10:05

## 2023-01-20 RX ADMIN — SODIUM CHLORIDE 1000 ML: 9 INJECTION, SOLUTION INTRAVENOUS at 20:37

## 2023-01-20 ASSESSMENT — PAIN DESCRIPTION - LOCATION
LOCATION: ABDOMEN

## 2023-01-20 ASSESSMENT — ENCOUNTER SYMPTOMS
DIARRHEA: 0
ABDOMINAL PAIN: 1
SHORTNESS OF BREATH: 0
BACK PAIN: 0
NAUSEA: 0
SORE THROAT: 0
VOMITING: 0
RHINORRHEA: 0
SHORTNESS OF BREATH: 0

## 2023-01-20 ASSESSMENT — PAIN DESCRIPTION - FREQUENCY
FREQUENCY: CONTINUOUS

## 2023-01-20 ASSESSMENT — LIFESTYLE VARIABLES: SMOKING_STATUS: 0

## 2023-01-20 ASSESSMENT — PAIN SCALES - GENERAL
PAINLEVEL_OUTOF10: 7
PAINLEVEL_OUTOF10: 5
PAINLEVEL_OUTOF10: 8
PAINLEVEL_OUTOF10: 2
PAINLEVEL_OUTOF10: 8
PAINLEVEL_OUTOF10: 2
PAINLEVEL_OUTOF10: 7
PAINLEVEL_OUTOF10: 7
PAINLEVEL_OUTOF10: 10

## 2023-01-20 ASSESSMENT — PAIN DESCRIPTION - ONSET
ONSET: ON-GOING

## 2023-01-20 ASSESSMENT — PAIN DESCRIPTION - DESCRIPTORS
DESCRIPTORS: ACHING

## 2023-01-20 ASSESSMENT — PAIN - FUNCTIONAL ASSESSMENT: PAIN_FUNCTIONAL_ASSESSMENT: 0-10

## 2023-01-20 ASSESSMENT — PAIN DESCRIPTION - PAIN TYPE
TYPE: SURGICAL PAIN

## 2023-01-20 NOTE — ANESTHESIA PRE PROCEDURE
Department of Anesthesiology  Preprocedure Note       Name:  Nohemi Anderson   Age:  29 y.o.  :  1990                                          MRN:  [id number]         Date:  2023      Surgeon: Bryson Olvera):  Hermilo Perez DO    Procedure: Procedure(s):  ROBOTIC ASSISTED LAPAROSCOPIC CHOLECYSTECTOMY WITH ICG    Medications prior to admission:   Prior to Admission medications    Medication Sig Start Date End Date Taking? Authorizing Provider   omeprazole (PRILOSEC) 20 MG delayed release capsule Take 1 capsule by mouth every morning (before breakfast) 23   Cessinee Mounika Blush, APRN   Atogepant (QULIPTA) 10 MG TABS Take 10 mg by mouth daily 23   Sallie L Crumble, APRN   tiZANidine (ZANAFLEX) 4 MG tablet Take 1 tablet by mouth 3 times daily as needed (muscle spasm/lumbar) 23   Sallie L Crumble, APRN   busPIRone (BUSPAR) 15 MG tablet Take 15 mg by mouth 3 times daily 12/15/22   Historical Provider, MD   lamoTRIgine (LAMICTAL) 100 MG tablet Take 100 mg by mouth daily 12/15/22   Historical Provider, MD   ibuprofen (ADVIL;MOTRIN) 800 MG tablet Take 800 mg by mouth every 6 hours as needed    Historical Provider, MD       Current medications:    Current Facility-Administered Medications   Medication Dose Route Frequency Provider Last Rate Last Admin    lactated ringers IV soln infusion   IntraVENous Continuous Genevieve Molina,  656 mL/hr at 23 0818 New Bag at 23 0818    sodium chloride flush 0.9 % injection 5-40 mL  5-40 mL IntraVENous 2 times per day Genevieve Molina DO        sodium chloride flush 0.9 % injection 5-40 mL  5-40 mL IntraVENous PRN Genevieve Molina, DO        0.9 % sodium chloride infusion   IntraVENous PRN Genevieve Molina, DO        ceFAZolin (ANCEF) 3000 mg in dextrose 5 % 100 mL IVPB  3,000 mg IntraVENous On Call to [address], DO           Allergies:     Allergies   Allergen Reactions    Morphine Other (See Comments)     Pt states it makes her feel like her body is on fire        Problem List:    Patient Active Problem List   Diagnosis Code    Calculus of gallbladder with chronic cholecystitis without obstruction K80.10       Past Medical History:        Diagnosis Date    Anxiety     GERD (gastroesophageal reflux disease)     Headache     History of alcohol abuse     sober since 2022       Past Surgical History:        Procedure Laterality Date     SECTION      MYRINGOTOMY W/ TUBES      TUBAL LIGATION         Social History:    Social History     Tobacco Use    Smoking status: Former     Packs/day: 0.50     Types: Cigarettes     Quit date: 2022     Years since quittin.1    Smokeless tobacco: Never   Substance Use Topics    Alcohol use: Not Currently     Comment: sober 2022                                Counseling given: Not Answered      Vital Signs (Current):   Vitals:    23 1544 23 0815   BP:  (!) 130/54   Pulse:  73   Resp:  18   Temp:  97.5 °F (36.4 °C)   TempSrc:  Temporal   SpO2:  97%   Weight: (!) 317 lb (143.8 kg) (!) 307 lb (139.3 kg)   Height:  5' 8\" (1.727 m)                                              BP Readings from Last 3 Encounters:   23 (!) 130/54   23 120/65   23 122/84       NPO Status: Time of last liquid consumption: 0000                        Time of last solid consumption: 0000                        Date of last liquid consumption: 23                        Date of last solid food consumption: 23    BMI:   Wt Readings from Last 3 Encounters:   23 (!) 307 lb (139.3 kg)   23 (!) 310 lb (140.6 kg)   23 (!) 311 lb 12.8 oz (141.4 kg)     Body mass index is 46.68 kg/m².     CBC:   Lab Results   Component Value Date/Time    WBC 10.9 2022 10:39 AM    RBC 5.21 2022 10:39 AM    HGB 14.6 2022 10:39 AM    HCT 47.8 2022 10:39 AM    MCV 91.7 2022 10:39 AM    RDW 13.8 2022 10:39 AM     2022 10:39 AM       CMP:   Lab Results Component Value Date/Time     12/21/2022 10:39 AM    K 4.8 12/21/2022 10:39 AM     12/21/2022 10:39 AM    CO2 23 12/21/2022 10:39 AM    BUN 12 12/21/2022 10:39 AM    CREATININE 0.6 12/21/2022 10:39 AM    LABGLOM >60 12/21/2022 10:39 AM    GLUCOSE 81 12/21/2022 10:39 AM    PROT 6.7 12/21/2022 10:39 AM    CALCIUM 9.5 12/21/2022 10:39 AM    BILITOT 0.3 12/21/2022 10:39 AM    ALKPHOS 129 12/21/2022 10:39 AM    AST 20 12/21/2022 10:39 AM    ALT 26 12/21/2022 10:39 AM       POC Tests: No results for input(s): POCGLU, POCNA, POCK, POCCL, POCBUN, POCHEMO, POCHCT in the last 72 hours. Coags: No results found for: PROTIME, INR, APTT    HCG (If Applicable):   Lab Results   Component Value Date    PREGTESTUR Negative 01/20/2023        ABGs: No results found for: PHART, PO2ART, IAU3DCR, LJM4ABL, BEART, M8KJKHCX     Type & Screen (If Applicable):  No results found for: LABABO, LABRH    Drug/Infectious Status (If Applicable):  No results found for: HIV, HEPCAB    COVID-19 Screening (If Applicable): No results found for: COVID19        Anesthesia Evaluation  Patient summary reviewed and Nursing notes reviewed no history of anesthetic complications:   Airway: Mallampati: II  TM distance: >3 FB   Neck ROM: full  Mouth opening: > = 3 FB   Dental: normal exam         Pulmonary:Negative Pulmonary ROS and normal exam  breath sounds clear to auscultation      (-) shortness of breath and not a current smoker          Patient did not smoke on day of surgery. Cardiovascular:        (-) hypertension, CAD,  angina and  CHF    NYHA Classification: I  ECG reviewed  Rhythm: regular  Rate: normal           Beta Blocker:  Not on Beta Blocker         Neuro/Psych:   Negative Neuro/Psych ROS  (+) headaches:,    (-) seizures, CVA and depression/anxiety            GI/Hepatic/Renal: Neg GI/Hepatic/Renal ROS  (+) GERD:,      (-) hiatal hernia       Endo/Other: Negative Endo/Other ROS             Pt had PAT visit. Abdominal:       Abdomen: soft. Vascular: Other Findings:           Anesthesia Plan      general     ASA 2     (Iv zofran within 30 min of closing )  Induction: intravenous. BIS  MIPS: Postoperative opioids intended and Prophylactic antiemetics administered. Anesthetic plan and risks discussed with patient. Use of blood products discussed with patient whom. Plan discussed with CRNA.     Attending anesthesiologist reviewed and agrees with Pre Eval content                Ismael Fu MD   1/20/2023

## 2023-01-20 NOTE — ANESTHESIA POSTPROCEDURE EVALUATION
Department of Anesthesiology  Postprocedure Note    Patient: Keysha Mustafa  MRN: [id number]  YOB: 1990  Date of evaluation: 1/20/2023      Procedure Summary     Date: 01/20/23 Room / Location: [address]    Anesthesia Start: 1000 Anesthesia Stop: 5252    Procedure: ROBOTIC ASSISTED LAPAROSCOPIC CHOLECYSTECTOMY WITH ICG Diagnosis:       Calculus of gallbladder with chronic cholecystitis without obstruction      (Calculus of gallbladder with chronic cholecystitis without obstruction [R87.71])    Surgeons: Faby Womack DO Responsible Provider: YINKA Gross CRNA    Anesthesia Type: general ASA Status: 2          Anesthesia Type: No value filed.     Sravani Phase I:      Sravani Phase II:        Anesthesia Post Evaluation    Patient location during evaluation: PACU  Patient participation: waiting for patient participation  Level of consciousness: responsive to painful stimuli  Airway patency: patent  Nausea & Vomiting: no vomiting and no nausea  Complications: no  Cardiovascular status: hemodynamically stable  Respiratory status: acceptable, oral airway and face mask  Hydration status: stable

## 2023-01-20 NOTE — INTERVAL H&P NOTE
Update History & Physical    The patient's History and Physical of January 5, 2023 was reviewed with the patient and I examined the patient. There was no change. The surgical site was confirmed by the patient and me. Plan: The risks, benefits, expected outcome, and alternative to the recommended procedure have been discussed with the patient. Patient understands and wants to proceed with the procedure.      Electronically signed by Yariel Valadez DO on 7/02/3000 at 9:31 AM

## 2023-01-20 NOTE — OP NOTE
Operative Note      Patient: Laura Guzman  YOB: 1990  MRN: [id number]    Date of Procedure: 1/20/2023    Pre-Op Diagnosis: Calculus of gallbladder with chronic cholecystitis without obstruction [K80.10]    Post-Op Diagnosis: Same       Procedure(s):  ROBOTIC ASSISTED LAPAROSCOPIC CHOLECYSTECTOMY WITH ICG    Surgeon(s):  Danya Desai DO    Assistant:   First Assistant: Misa Bach    Anesthesia: General    Estimated Blood Loss (mL): Minimal    Complications: None    Specimens:   ID Type Source Tests Collected by Time Destination   A : gallbladder Tissue Gallbladder SURGICAL PATHOLOGY Danya Desai DO 0/73/1109 1135        Implants:  Implant Name Type Inv. Item Serial No.  Lot No. LRB No. Used Action   CLIP INT L POLYMER SHAYY LIG HEM O SHAYY - [id number]  CLIP INT L POLYMER SHAYY LIG HEM O SHAYY  [address] [id number] N/A 2 Implanted         Drains:   Closed/Suction Drain Superior Stomach Bulb (Active)       Findings: impacted stone in the distal cystic duct. Critical View of safety obtained. Detailed Description of Procedure:   Ms. Anthony Bledsoe was taken to the main operating room and placed on the operating table supine. After the induction of adequate general endotracheal anesthesia the  abdomen was prepped in the usual sterile fashion. Time out performed identifing the correct patient, preoperative antibiotics, and necessary equipment. Local anesthestic was administered to the inferior portion of the umbilicus, and incision was made. Disection was performed to the fasica and a 8 mm robotic trocar was inserted. The laparoscope was advanced and inspection undertaken. There was no evidence of trauma from the trocar insertion. The liver was normal in appearance. The gallbladder was not initially seen. Eight millimeter Working ports were placed, 10 mm lateral to the umbilicus on each side and on the right anterior axillary line.   The patient was placed in reverse trendelenberg position and rotated to the left. The Da Nicole X operating system was brought into the field and docked. Working instruments were advanced and the fundus of the gallbladder was grasped and elevated. The neck was grabbed, placed on stretch and the triangle of Calot opened. Firefly vision was used to visualize the cystic duct and biliary anatomy. The cystic duct that was visible and the gallbladder did not fill, but distally on the cystic duct there was firefly visible. There appeared to be a large stone impacted in the duct. The neck of the gallbladder was dissected with blunt and electorcautery to visualize the cystic duct. This was cleared of  surrounding tissue. Adjacent to this the cystic artery was identified and also cleared of surrounding tissue. The triangle of Calot was completely dissected and an excellent critical view of safety obtained. The cystic duct was clipped proximally and distally divided with cautery. The stone was then milked out of the duct and the duct was then over-sewn with a 000 Stratafix suture. The cystic artery was divided with cautery. The gallbladder was then released from the undersurface of the liver using cautery. Once free, it was placed it in an Endocatch retrieval bag and removed through the umbilical incision without problem. The gallbladder was then sent to pathology. Dome of the liver, right gutter, and subhepatic space were irrigated and the irrigant removed with suction. There was a small laceration to the liver which was cauterized and covered with mary. The gallbladder fossa and liver were without bleeding. The cystic duct and cystic artery stumps were well seen with clips across each and no evidence of bleeding or bile leak. A 15 [employer] drain was placed through the right lateral port site. The working ports were removed under vision with no bleeding noted. The pneumoperitoneum was released and the umbilical port removed. Fascia at the umbilicus was reapproximated with 0 Vicryl suture. Each port site was injected with 10 mL Experel. Skin incisions were closed with interrupted 4-0 Monocryl subcuticular suture followed by Dermabond dressing. Sponge, needle, instrument count correct on 2 occasions. Estimated intraoperative blood loss, minimal.    Ms. Matilde Grant tolerated her surgery well and she was taken to PACU in satisfactory condition.         ________________________________  Sarai Nicholas DO          Electronically signed by Sarai Nicholas DO on 0/06/6985 at 11:59 AM

## 2023-01-21 VITALS
HEART RATE: 85 BPM | WEIGHT: 293 LBS | SYSTOLIC BLOOD PRESSURE: 142 MMHG | HEIGHT: 68 IN | TEMPERATURE: 98.1 F | RESPIRATION RATE: 18 BRPM | BODY MASS INDEX: 44.41 KG/M2 | OXYGEN SATURATION: 97 % | DIASTOLIC BLOOD PRESSURE: 86 MMHG

## 2023-01-21 PROBLEM — G89.18 POST-OPERATIVE PAIN: Status: ACTIVE | Noted: 2023-01-21

## 2023-01-21 PROBLEM — G89.18 POST-OP PAIN: Status: ACTIVE | Noted: 2023-01-21

## 2023-01-21 LAB
ALBUMIN SERPL-MCNC: 3.3 G/DL (ref 3.5–5.2)
ALP BLD-CCNC: 96 U/L (ref 35–104)
ALT SERPL-CCNC: 34 U/L (ref 5–33)
ANION GAP SERPL CALCULATED.3IONS-SCNC: 7 MMOL/L (ref 7–19)
AST SERPL-CCNC: 26 U/L (ref 5–32)
BILIRUB SERPL-MCNC: <0.2 MG/DL (ref 0.2–1.2)
BUN BLDV-MCNC: 13 MG/DL (ref 6–20)
CALCIUM SERPL-MCNC: 8.6 MG/DL (ref 8.6–10)
CHLORIDE BLD-SCNC: 108 MMOL/L (ref 98–111)
CO2: 25 MMOL/L (ref 22–29)
CREAT SERPL-MCNC: 0.6 MG/DL (ref 0.5–0.9)
GFR SERPL CREATININE-BSD FRML MDRD: >60 ML/MIN/{1.73_M2}
GLUCOSE BLD-MCNC: 113 MG/DL (ref 74–109)
POTASSIUM SERPL-SCNC: 4 MMOL/L (ref 3.5–5)
SARS-COV-2, NAAT: NOT DETECTED
SODIUM BLD-SCNC: 140 MMOL/L (ref 136–145)
TOTAL PROTEIN: 5.8 G/DL (ref 6.6–8.7)

## 2023-01-21 PROCEDURE — 2580000003 HC RX 258: Performed by: SURGERY

## 2023-01-21 PROCEDURE — 6360000002 HC RX W HCPCS: Performed by: SURGERY

## 2023-01-21 PROCEDURE — 80053 COMPREHEN METABOLIC PANEL: CPT

## 2023-01-21 PROCEDURE — 36415 COLL VENOUS BLD VENIPUNCTURE: CPT

## 2023-01-21 PROCEDURE — 96375 TX/PRO/DX INJ NEW DRUG ADDON: CPT

## 2023-01-21 PROCEDURE — 6370000000 HC RX 637 (ALT 250 FOR IP): Performed by: SURGERY

## 2023-01-21 PROCEDURE — 99024 POSTOP FOLLOW-UP VISIT: CPT | Performed by: SURGERY

## 2023-01-21 PROCEDURE — 94760 N-INVAS EAR/PLS OXIMETRY 1: CPT

## 2023-01-21 PROCEDURE — 6360000002 HC RX W HCPCS: Performed by: EMERGENCY MEDICINE

## 2023-01-21 PROCEDURE — 96376 TX/PRO/DX INJ SAME DRUG ADON: CPT

## 2023-01-21 PROCEDURE — 87635 SARS-COV-2 COVID-19 AMP PRB: CPT

## 2023-01-21 PROCEDURE — G0378 HOSPITAL OBSERVATION PER HR: HCPCS

## 2023-01-21 PROCEDURE — 96372 THER/PROPH/DIAG INJ SC/IM: CPT

## 2023-01-21 RX ORDER — SODIUM CHLORIDE 0.9 % (FLUSH) 0.9 %
5-40 SYRINGE (ML) INJECTION PRN
Status: DISCONTINUED | OUTPATIENT
Start: 2023-01-21 | End: 2023-01-21 | Stop reason: HOSPADM

## 2023-01-21 RX ORDER — HYDROMORPHONE HYDROCHLORIDE 1 MG/ML
1 INJECTION, SOLUTION INTRAMUSCULAR; INTRAVENOUS; SUBCUTANEOUS
Status: DISCONTINUED | OUTPATIENT
Start: 2023-01-21 | End: 2023-01-21

## 2023-01-21 RX ORDER — ACETAMINOPHEN 325 MG/1
650 TABLET ORAL EVERY 4 HOURS PRN
Status: DISCONTINUED | OUTPATIENT
Start: 2023-01-21 | End: 2023-01-21

## 2023-01-21 RX ORDER — KETOROLAC TROMETHAMINE 30 MG/ML
30 INJECTION, SOLUTION INTRAMUSCULAR; INTRAVENOUS EVERY 6 HOURS PRN
Status: DISCONTINUED | OUTPATIENT
Start: 2023-01-21 | End: 2023-01-21 | Stop reason: HOSPADM

## 2023-01-21 RX ORDER — ONDANSETRON 2 MG/ML
4 INJECTION INTRAMUSCULAR; INTRAVENOUS EVERY 6 HOURS PRN
Status: DISCONTINUED | OUTPATIENT
Start: 2023-01-21 | End: 2023-01-21 | Stop reason: HOSPADM

## 2023-01-21 RX ORDER — METHOCARBAMOL 500 MG/1
500 TABLET, FILM COATED ORAL 3 TIMES DAILY
Qty: 15 TABLET | Refills: 0 | Status: ON HOLD | OUTPATIENT
Start: 2023-01-21 | End: 2023-01-26

## 2023-01-21 RX ORDER — SODIUM CHLORIDE 0.9 % (FLUSH) 0.9 %
5-40 SYRINGE (ML) INJECTION EVERY 12 HOURS SCHEDULED
Status: DISCONTINUED | OUTPATIENT
Start: 2023-01-21 | End: 2023-01-21 | Stop reason: HOSPADM

## 2023-01-21 RX ORDER — IBUPROFEN 800 MG/1
800 TABLET ORAL
Qty: 15 TABLET | Refills: 0 | Status: ON HOLD | OUTPATIENT
Start: 2023-01-21 | End: 2023-01-26

## 2023-01-21 RX ORDER — HYDROMORPHONE HYDROCHLORIDE 1 MG/ML
0.25 INJECTION, SOLUTION INTRAMUSCULAR; INTRAVENOUS; SUBCUTANEOUS
Status: DISCONTINUED | OUTPATIENT
Start: 2023-01-21 | End: 2023-01-21

## 2023-01-21 RX ORDER — ENOXAPARIN SODIUM 100 MG/ML
30 INJECTION SUBCUTANEOUS 2 TIMES DAILY
Status: DISCONTINUED | OUTPATIENT
Start: 2023-01-21 | End: 2023-01-21 | Stop reason: HOSPADM

## 2023-01-21 RX ORDER — ONDANSETRON 4 MG/1
4 TABLET, ORALLY DISINTEGRATING ORAL EVERY 8 HOURS PRN
Status: DISCONTINUED | OUTPATIENT
Start: 2023-01-21 | End: 2023-01-21 | Stop reason: HOSPADM

## 2023-01-21 RX ORDER — SODIUM CHLORIDE 9 MG/ML
INJECTION, SOLUTION INTRAVENOUS PRN
Status: DISCONTINUED | OUTPATIENT
Start: 2023-01-21 | End: 2023-01-21 | Stop reason: HOSPADM

## 2023-01-21 RX ORDER — OXYCODONE AND ACETAMINOPHEN 7.5; 325 MG/1; MG/1
1 TABLET ORAL EVERY 4 HOURS PRN
Status: DISCONTINUED | OUTPATIENT
Start: 2023-01-21 | End: 2023-01-21 | Stop reason: HOSPADM

## 2023-01-21 RX ORDER — ENOXAPARIN SODIUM 100 MG/ML
40 INJECTION SUBCUTANEOUS DAILY
Status: DISCONTINUED | OUTPATIENT
Start: 2023-01-21 | End: 2023-01-21 | Stop reason: DRUGHIGH

## 2023-01-21 RX ORDER — METHOCARBAMOL 500 MG/1
500 TABLET, FILM COATED ORAL 4 TIMES DAILY
Status: DISCONTINUED | OUTPATIENT
Start: 2023-01-21 | End: 2023-01-21 | Stop reason: HOSPADM

## 2023-01-21 RX ADMIN — HYDROMORPHONE HYDROCHLORIDE 1 MG: 1 INJECTION, SOLUTION INTRAMUSCULAR; INTRAVENOUS; SUBCUTANEOUS at 01:36

## 2023-01-21 RX ADMIN — SODIUM CHLORIDE, PRESERVATIVE FREE 10 ML: 5 INJECTION INTRAVENOUS at 08:56

## 2023-01-21 RX ADMIN — OXYCODONE HYDROCHLORIDE AND ACETAMINOPHEN 1 TABLET: 7.5; 325 TABLET ORAL at 08:50

## 2023-01-21 RX ADMIN — ONDANSETRON 4 MG: 2 INJECTION INTRAMUSCULAR; INTRAVENOUS at 05:15

## 2023-01-21 RX ADMIN — HYDROMORPHONE HYDROCHLORIDE 0.25 MG: 1 INJECTION, SOLUTION INTRAMUSCULAR; INTRAVENOUS; SUBCUTANEOUS at 11:49

## 2023-01-21 RX ADMIN — METHOCARBAMOL 500 MG: 500 TABLET ORAL at 08:50

## 2023-01-21 RX ADMIN — KETOROLAC TROMETHAMINE 30 MG: 30 INJECTION, SOLUTION INTRAMUSCULAR; INTRAVENOUS at 08:50

## 2023-01-21 RX ADMIN — ENOXAPARIN SODIUM 30 MG: 100 INJECTION SUBCUTANEOUS at 08:51

## 2023-01-21 RX ADMIN — HYDROMORPHONE HYDROCHLORIDE 1 MG: 1 INJECTION, SOLUTION INTRAMUSCULAR; INTRAVENOUS; SUBCUTANEOUS at 05:11

## 2023-01-21 ASSESSMENT — ENCOUNTER SYMPTOMS
EYE DISCHARGE: 0
WHEEZING: 0
DIARRHEA: 0
SORE THROAT: 0
FACIAL SWELLING: 0
EYE PAIN: 0
ABDOMINAL DISTENTION: 1
BACK PAIN: 0
SHORTNESS OF BREATH: 0
CHOKING: 0
CHEST TIGHTNESS: 0
VOMITING: 0
ABDOMINAL PAIN: 1
EYE REDNESS: 0
CONSTIPATION: 0

## 2023-01-21 ASSESSMENT — PAIN DESCRIPTION - ORIENTATION
ORIENTATION: LOWER
ORIENTATION: MID
ORIENTATION: LOWER

## 2023-01-21 ASSESSMENT — PAIN DESCRIPTION - DESCRIPTORS
DESCRIPTORS: DISCOMFORT
DESCRIPTORS: ACHING
DESCRIPTORS: ACHING;DISCOMFORT

## 2023-01-21 ASSESSMENT — PAIN DESCRIPTION - LOCATION
LOCATION: ABDOMEN
LOCATION: HEAD;ABDOMEN
LOCATION: ABDOMEN

## 2023-01-21 ASSESSMENT — PAIN SCALES - GENERAL
PAINLEVEL_OUTOF10: 10

## 2023-01-21 NOTE — PROGRESS NOTES
Automatic Dose Adjustment of                Subcutaneous Anticoagulant for Prophylaxis    Frannie Kent is a 29 y.o. female. Recent Labs     01/20/23  2150   CREATININE 0.6       Estimated Creatinine Clearance: 204 mL/min (based on SCr of 0.6 mg/dL). Weight:  Wt Readings from Last 1 Encounters:   01/20/23 (!) 307 lb (139.3 kg)           Pharmacy has adjusted subcutaneous anticoagulant for prophylaxis to Enoxaparin 30 mg SC twice daily based on the patient's weight and estimated CrCl per [facility] policy.                Electronically signed by Lenka Olivas [facility] on 1/21/2023 at 1:42 AM

## 2023-01-21 NOTE — ED NOTES
PT assisted back to bed without incident, clear, yellow urine returned. AGNIESZKA drain site cleansed with NS soaked sterile 4x4's then betadine swabs. PT tolerated well. Dressing applied consisting of sterile fenestrated gauze and Tegaderm. Dressing C/D/I, no bleeding present at site.       Griselda Watson, CHIARA  01/20/23 4957

## 2023-01-21 NOTE — ED NOTES
Venipuncture attempted by Candice Norton RN without success.  Phlebotomist messaged via perfectserve secure messaging system to draw blood      Magdalena Thapa RN  01/20/23 4576

## 2023-01-21 NOTE — ED PROVIDER NOTES
[facility] EMERGENCY DEPT  eMERGENCY dEPARTMENT eNCOUnter      Pt Name: Laura Guzman  MRN: [id number]  [place] 1990  Date of evaluation: 1/20/2023  Provider: Sadie Taylro MD    CHIEF COMPLAINT       Chief Complaint   Patient presents with    Post-op Problem     Pt reports having gallbladder surgery today by Dr. Modesta Krause. Pt reports increased pain to upper abdomen. HISTORY OF PRESENT ILLNESS   (Location/Symptom, Timing/Onset,Context/Setting, Quality, Duration, Modifying Factors, Severity)  Note limiting factors. Laura Guzman is a 29 y.o. female who presents to the emergency department with severe right upper quadrant abdominal pain. Patient had a cholecystectomy today with Dr. Modesta Krause. She had a AGNIESZKA drain placed. Her pain is located at the site of her drain. She has had some red serosanguineous drainage. She has chills but no definite fever. She took a 5 mg Percocet with no improvement of her pain. HPI    NursingNotes were reviewed. REVIEW OF SYSTEMS    (2-9 systems for level 4, 10 or more for level 5)     Review of Systems   Constitutional:  Positive for chills. Negative for fever. HENT:  Negative for rhinorrhea and sore throat. Respiratory:  Negative for shortness of breath. Cardiovascular:  Negative for chest pain and leg swelling. Gastrointestinal:  Positive for abdominal pain. Negative for diarrhea, nausea and vomiting. Genitourinary:  Negative for difficulty urinating. Musculoskeletal:  Negative for back pain and neck pain. Skin:  Negative for rash. Neurological:  Negative for weakness and headaches. Psychiatric/Behavioral:  Negative for confusion. A complete review of systems was performed and is negative except as noted above in the HPI.        PAST MEDICAL HISTORY     Past Medical History:   Diagnosis Date    Anxiety     GERD (gastroesophageal reflux disease)     Headache     History of alcohol abuse     sober since 5/5/2022         SURGICAL HISTORY       Past Surgical History:   Procedure Laterality Date     SECTION      CHOLECYSTECTOMY      MYRINGOTOMY W/ TUBES      TUBAL LIGATION           CURRENT MEDICATIONS       Previous Medications    ATOGEPANT (QULIPTA) 10 MG TABS    Take 10 mg by mouth daily    BUSPIRONE (BUSPAR) 15 MG TABLET    Take 15 mg by mouth 3 times daily    IBUPROFEN (ADVIL;MOTRIN) 800 MG TABLET    Take 800 mg by mouth every 6 hours as needed    LAMOTRIGINE (LAMICTAL) 100 MG TABLET    Take 100 mg by mouth daily    OMEPRAZOLE (PRILOSEC) 20 MG DELAYED RELEASE CAPSULE    Take 1 capsule by mouth every morning (before breakfast)    OXYCODONE-ACETAMINOPHEN (PERCOCET) 5-325 MG PER TABLET    Take 1 tablet by mouth every 6 hours as needed for Pain for up to 3 days. Intended supply: 3 days.  Take lowest dose possible to manage pain Max Daily Amount: 4 tablets    TIZANIDINE (ZANAFLEX) 4 MG TABLET    Take 1 tablet by mouth 3 times daily as needed (muscle spasm/lumbar)       ALLERGIES     Morphine    FAMILY HISTORY       Family History   Problem Relation Age of Onset    High Blood Pressure Mother     Heart Disease Father     High Blood Pressure Father     Cancer Paternal Grandmother     Colon Polyps Neg Hx     Colon Cancer Neg Hx           SOCIAL HISTORY       Social History     Socioeconomic History    Marital status: Single     Spouse name: None    Number of children: None    Years of education: None    Highest education level: None   Tobacco Use    Smoking status: Former     Packs/day: 0.50     Types: Cigarettes     Quit date: 2022     Years since quittin.1    Smokeless tobacco: Never   Vaping Use    Vaping Use: Every day    Substances: Nicotine, Flavoring    Devices: Refillable tank   Substance and Sexual Activity    Alcohol use: Not Currently     Comment: sober 2022    Drug use: Not Currently     Social Determinants of Health     Financial Resource Strain: Medium Risk    Difficulty of Paying Living Expenses: Somewhat hard   Food Insecurity: No Food Insecurity    Worried About Running Out of Food in the Last Year: Never true    Ran Out of Food in the Last Year: Never true   Physical Activity: Insufficiently Active    Days of Exercise per Week: 3 days    Minutes of Exercise per Session: 30 min   Intimate Partner Violence: Not At Risk    Fear of Current or Ex-Partner: No    Emotionally Abused: No    Physically Abused: No    Sexually Abused: No       SCREENINGS    Marcelo Coma Scale  Eye Opening: Spontaneous  Best Verbal Response: Oriented  Best Motor Response: Obeys commands  Novi Coma Scale Score: 15        PHYSICAL EXAM    (up to 7 for level 4, 8 or more for level 5)     ED Triage Vitals [01/20/23 1933]   BP Temp Temp Source Heart Rate Resp SpO2 Height Weight   (!) 140/80 98.3 °F (36.8 °C) Oral 100 18 95 % -- --       Physical Exam  Vitals and nursing note reviewed. Constitutional:       General: She is not in acute distress. Appearance: She is well-developed. She is not diaphoretic. HENT:      Head: Normocephalic and atraumatic. Eyes:      Pupils: Pupils are equal, round, and reactive to light. Cardiovascular:      Rate and Rhythm: Normal rate and regular rhythm. Heart sounds: Normal heart sounds. Pulmonary:      Effort: Pulmonary effort is normal. No respiratory distress. Breath sounds: Normal breath sounds. Abdominal:      General: Bowel sounds are normal. There is no distension. Palpations: Abdomen is soft. Tenderness: There is abdominal tenderness. Comments: TTP RUQ, surgical sites c/d/I, drain in place with red serosanguinous fluid   Musculoskeletal:         General: Normal range of motion. Cervical back: Normal range of motion and neck supple. Skin:     General: Skin is warm and dry. Findings: No rash. Neurological:      Mental Status: She is alert and oriented to person, place, and time. Cranial Nerves: No cranial nerve deficit. Motor: No abnormal muscle tone.       Coordination: Coordination normal.   Psychiatric:         Behavior: Behavior normal.       DIAGNOSTIC RESULTS     EKG: All EKG's are interpreted by the Emergency Department Physician who either signs or Co-signs this chart in the absence of a cardiologist.        RADIOLOGY:   Non-plain film images such as CT, Ultrasound and MRI are read by the radiologist. Geovanny Neves images are visualized and preliminarily interpreted by the emergency physician with the below findings:        Interpretation per the Radiologist below, if available at the time of this note:    CT ABDOMEN PELVIS W IV CONTRAST Additional Contrast? None   Final Result   1. Expected postoperative changes status post laparoscopic cholecystectomy. 2. Percutaneous drain terminating lateral to the right hepatic lobe. 3. Trace pleural effusions. Bibasilar atelectasis. Electronically signed by Gregg Oden M.D.    on 01-20-23 at [phone]            ED BEDSIDE ULTRASOUND:   Performed by ED Physician - none    LABS:  Labs Reviewed   CBC WITH AUTO DIFFERENTIAL - Abnormal; Notable for the following components:       Result Value    WBC 12.9 (*)     MCHC 32.3 (*)     Neutrophils % 87.9 (*)     Lymphocytes % 9.6 (*)     Neutrophils Absolute 11.4 (*)     All other components within normal limits   COMPREHENSIVE METABOLIC PANEL - Abnormal; Notable for the following components:    Glucose 133 (*)     Total Protein 6.1 (*)     Albumin 3.3 (*)     ALT 38 (*)     AST 34 (*)     All other components within normal limits   URINALYSIS WITH REFLEX TO CULTURE - Abnormal; Notable for the following components:    Blood, Urine TRACE (*)     Leukocyte Esterase, Urine SMALL (*)     All other components within normal limits   MICROSCOPIC URINALYSIS - Abnormal; Notable for the following components:    Mucus, UA Rare (*)     WBC, UA 3-5 (*)     Bacteria, UA Rare (*)     All other components within normal limits   COVID-19, RAPID   LIPASE   HCG, SERUM, QUALITATIVE       All other labs were within normal range or not returned as of this dictation. EMERGENCY DEPARTMENT COURSE and DIFFERENTIALDIAGNOSIS/MDM:   Vitals:    Vitals:    01/20/23 1933 01/20/23 2001 01/20/23 2330   BP: (!) 140/80 (!) 141/81 (!) 147/88   Pulse: 100 98 97   Resp: 18 16 18   Temp: 98.3 °F (36.8 °C)     TempSrc: Oral     SpO2: 95% 96% 96%       MDM    D/w Dr Nicholas Glass. Pt states she is still in severe pain after 2 rounds of dilaudid. CT and labwork are reassuring. He states she can go home and take 2 percocet q4 hours, or if her pain is too severe, can admit for observation and he can see her in the morning. Too soon to do any further imaging. D/w patient and she states her pain is too bad, she has no transportation to come back to hospital and would have to call an ambulance to come back. CONSULTS:  None    PROCEDURES:  Unless otherwise notedbelow, none     Procedures    FINAL IMPRESSION     1. Acute post-operative pain    2.  Intractable pain          DISPOSITION/PLAN   DISPOSITION Decision To Admit 01/20/2023 11:54:20 PM      PATIENT REFERRED TO:  @FUP@    DISCHARGE MEDICATIONS:  New Prescriptions    No medications on file          (Please note that portions of this note were completed with a voice recognition program.  Efforts were made to edit the dictations butoccasionally words are mis-transcribed.)    Uzair Doe MD (electronically signed)  AttendingEmergency Physician         Uzair Doe MD  01/21/23 0000

## 2023-01-21 NOTE — ED NOTES
Attempted to call report to 5th floor, no answer after 3 minutes, will attempt to call back.       Rico Jack RN  01/21/23 2481

## 2023-01-21 NOTE — ED NOTES
Lab called again for phlebotomist to draw blood at this time by unit secretary.       Alona Peoples RN  01/20/23 8518

## 2023-01-21 NOTE — DISCHARGE INSTR - DIET

## 2023-01-21 NOTE — ED NOTES
Pt assisted to b/s commode, clear yellow urine returned. PT assisted back to bed with friend without incident.      Bari Meredith RN  01/20/23 2690

## 2023-01-21 NOTE — DISCHARGE INSTRUCTIONS
No heavy lifting for 3 weeks. May resume normal diet. No driving while on pain medication. May shower 24 hours postoperatively. Do not soak in tub. No swimming. Allow glue on incision to fall off on its own.     Please call the office if you have:   - Fever or chills   - Difficulty with bowel movements   - Increased pain   - Nausea and/or vomiting   - Redness or drainage from the incision sites

## 2023-01-21 NOTE — ED NOTES
Pt unable to provide urine sample at this time, pt advised to call out when she feels she can attempt to void.       Gale Winn RN  01/20/23 2003

## 2023-01-21 NOTE — ED NOTES
IV established, unable to collect labs with IV start. Venipuncture attempted to RAC x 1 without success. PT tolerated well.  Warm blanket applied, pt changed into barreran      Ileana Toth RN  01/20/23 2000

## 2023-01-21 NOTE — H&P
[address] [facility] Surgery History & Physical    Chief Complaint:  Chief Complaint   Patient presents with    Post-op Problem     Pt reports having gallbladder surgery today by Dr. Dwain Chase. Pt reports increased pain to upper abdomen. SUBJECTIVE:  Ms. Cassie Patel is a 29 y.o. female who presents today with abdominal pain. Patient underwent robotic cholecystectomy yesterday by Dr. Dwain Chase. States after she got home she did not feel like her pain was controlled with p.o. pain medication. She did present back to the ER last night with complaints of discomfort. Underwent lab work and CT scan. All imaging and lab work consistent with postoperative state. Patient admitted overnight for pain control. Doing better this AM.  Drain in place with serosanguineous output. Denies nausea or vomiting. Denies fevers or chills.       Past Medical History:   Diagnosis Date    Anxiety     GERD (gastroesophageal reflux disease)     Headache     History of alcohol abuse     sober since 2022     Past Surgical History:   Procedure Laterality Date     SECTION      CHOLECYSTECTOMY      MYRINGOTOMY W/ TUBES      TUBAL LIGATION       Current Facility-Administered Medications   Medication Dose Route Frequency Provider Last Rate Last Admin    sodium chloride flush 0.9 % injection 5-40 mL  5-40 mL IntraVENous 2 times per day Suzon Nice, DO   10 mL at 23 0856    sodium chloride flush 0.9 % injection 5-40 mL  5-40 mL IntraVENous PRN Suzon Nice, DO        0.9 % sodium chloride infusion   IntraVENous PRN Suzon Nice, DO        ondansetron (ZOFRAN-ODT) disintegrating tablet 4 mg  4 mg Oral Q8H PRN Aislinnon Nice, DO        Or    ondansetron (ZOFRAN) injection 4 mg  4 mg IntraVENous Q6H PRN Aislinnon Nice, DO   4 mg at 23 0515    enoxaparin Sodium (LOVENOX) injection 30 mg  30 mg SubCUTAneous BID Charline Carcamo MD   30 mg at 23 0851    oxyCODONE-acetaminophen (PERCOCET) 7.5-325 MG per tablet 1 tablet  1 tablet Oral Q4H PRN Marnee [place], DO   1 tablet at 23 0850    methocarbamol (ROBAXIN) tablet 500 mg  500 mg Oral 4x Daily Marnee [place], DO   500 mg at 23 3913    ketorolac (TORADOL) injection 30 mg  30 mg IntraVENous Q6H PRN Marnee [place], DO   30 mg at 23 1241     Allergies: Morphine    Family History   Problem Relation Age of Onset    High Blood Pressure Mother     Heart Disease Father     High Blood Pressure Father     Cancer Paternal Grandmother     Colon Polyps Neg Hx     Colon Cancer Neg Hx        Social History     Tobacco Use    Smoking status: Former     Packs/day: 0.50     Types: Cigarettes     Quit date: 2022     Years since quittin.1    Smokeless tobacco: Never   Substance Use Topics    Alcohol use: Not Currently     Comment: sober 2022       Review of Systems   Constitutional:  Negative for activity change, chills, fatigue and fever. HENT:  Negative for facial swelling, hearing loss and sore throat. Eyes:  Negative for pain, discharge and redness. Respiratory:  Negative for choking, chest tightness, shortness of breath and wheezing. Cardiovascular:  Negative for chest pain and palpitations. Gastrointestinal:  Positive for abdominal distention and abdominal pain. Negative for constipation, diarrhea and vomiting. Musculoskeletal:  Negative for back pain, neck pain and neck stiffness. Neurological:  Negative for dizziness, speech difficulty, weakness and numbness. Psychiatric/Behavioral:  Negative for agitation, hallucinations and suicidal ideas. OBJECTIVE:  BP (!) 142/86   Pulse 85   Temp 98.1 °F (36.7 °C) (Temporal)   Resp 18   Ht 5' 8\" (1.727 m)   Wt (!) 307 lb (139.3 kg)   LMP 2023   SpO2 97%   BMI 46.68 kg/m²   CONSTITUTIONAL: Alert, appropriate, no acute distress  SKIN: warm, dry with no rashes or lesions  HEENT: NCAT, Non icteric, PERR. Trachea Midline.   CHEST/LUNGS: Normal respiratory effort. CARDIOVASCULAR:  No edema. ABDOMEN: Appropriate tenderness to palpation postoperatively. AGNIESZKA with serosanguineous output. Dermabond in place over robotic incisions. NEUROLOGIC: CN II-XI grossly intact, no motor or sensory deficits   MUSCULOSKELETAL: No clubbing or cyanosis. PSYCHIATRIC:  Normal Mood and Affect. Alert and Oriented. LABS:  CBC:   Recent Labs     01/20/23 2150   WBC 12.9*   HGB 12.9        BMP:    Recent Labs     01/20/23 2150 01/21/23  0740    140   K 3.9 4.0    108   CO2 23 25   BUN 11 13   CREATININE 0.6 0.6   GLUCOSE 133* 113*       ASSESSMENT:  Principal Problem:    Post-operative pain  Active Problems:    Post-op pain  Resolved Problems:    * No resolved hospital problems.  *      PLAN:  Advance diet  DC IV pain medication  P.o. pain medication and muscle relaxers  Plan to DC home later today  Follow-up with Dr. Danisha Valadez in 1 week for drain check    Makayla Foote DO   Electronically Signed on 1/21/2023 at 12:07 PM

## 2023-01-23 ENCOUNTER — HOSPITAL ENCOUNTER (EMERGENCY)
Age: 33
Discharge: HOME | DRG: 394 | End: 2023-01-24
Attending: PEDIATRICS
Payer: MEDICAID

## 2023-01-23 ENCOUNTER — APPOINTMENT (OUTPATIENT)
Dept: CT IMAGING | Age: 33
DRG: 394 | End: 2023-01-23
Payer: MEDICAID

## 2023-01-23 ENCOUNTER — CARE COORDINATION (OUTPATIENT)
Dept: CASE MANAGEMENT | Age: 33
End: 2023-01-23

## 2023-01-23 DIAGNOSIS — N39.0 ACUTE URINARY TRACT INFECTION: ICD-10-CM

## 2023-01-23 DIAGNOSIS — G89.18 POST-OPERATIVE PAIN: Primary | ICD-10-CM

## 2023-01-23 DIAGNOSIS — R10.11 RIGHT UPPER QUADRANT ABDOMINAL PAIN: Primary | ICD-10-CM

## 2023-01-23 LAB
ALBUMIN SERPL-MCNC: 3.3 G/DL (ref 3.5–5.2)
ALP BLD-CCNC: 105 U/L (ref 35–104)
ALT SERPL-CCNC: 37 U/L (ref 5–33)
ANION GAP SERPL CALCULATED.3IONS-SCNC: 9 MMOL/L (ref 7–19)
AST SERPL-CCNC: 25 U/L (ref 5–32)
BACTERIA: NEGATIVE /HPF
BASOPHILS ABSOLUTE: 0.1 K/UL (ref 0–0.2)
BASOPHILS RELATIVE PERCENT: 0.5 % (ref 0–1)
BILIRUB SERPL-MCNC: <0.2 MG/DL (ref 0.2–1.2)
BILIRUBIN URINE: NEGATIVE
BLOOD, URINE: NEGATIVE
BUN BLDV-MCNC: 12 MG/DL (ref 6–20)
CALCIUM SERPL-MCNC: 8.4 MG/DL (ref 8.6–10)
CHLORIDE BLD-SCNC: 104 MMOL/L (ref 98–111)
CLARITY: CLEAR
CO2: 25 MMOL/L (ref 22–29)
COLOR: YELLOW
CREAT SERPL-MCNC: 0.6 MG/DL (ref 0.5–0.9)
CRYSTALS, UA: ABNORMAL /HPF
EOSINOPHILS ABSOLUTE: 0.4 K/UL (ref 0–0.6)
EOSINOPHILS RELATIVE PERCENT: 3.2 % (ref 0–5)
EPITHELIAL CELLS, UA: 8 /HPF (ref 0–5)
GFR SERPL CREATININE-BSD FRML MDRD: >60 ML/MIN/{1.73_M2}
GLUCOSE BLD-MCNC: 87 MG/DL (ref 74–109)
GLUCOSE URINE: NEGATIVE MG/DL
HCG QUALITATIVE: NEGATIVE
HCT VFR BLD CALC: 39 % (ref 37–47)
HEMOGLOBIN: 12.3 G/DL (ref 12–16)
HYALINE CASTS: 2 /HPF (ref 0–8)
IMMATURE GRANULOCYTES #: 0.1 K/UL
KETONES, URINE: NEGATIVE MG/DL
LEUKOCYTE ESTERASE, URINE: ABNORMAL
LIPASE: 20 U/L (ref 13–60)
LYMPHOCYTES ABSOLUTE: 3.5 K/UL (ref 1.1–4.5)
LYMPHOCYTES RELATIVE PERCENT: 29.5 % (ref 20–40)
MCH RBC QN AUTO: 28.1 PG (ref 27–31)
MCHC RBC AUTO-ENTMCNC: 31.5 G/DL (ref 33–37)
MCV RBC AUTO: 89.2 FL (ref 81–99)
MONOCYTES ABSOLUTE: 1 K/UL (ref 0–0.9)
MONOCYTES RELATIVE PERCENT: 8.6 % (ref 0–10)
NEUTROPHILS ABSOLUTE: 6.8 K/UL (ref 1.5–7.5)
NEUTROPHILS RELATIVE PERCENT: 57.8 % (ref 50–65)
NITRITE, URINE: NEGATIVE
PDW BLD-RTO: 14.3 % (ref 11.5–14.5)
PH UA: 6.5 (ref 5–8)
PLATELET # BLD: 375 K/UL (ref 130–400)
PMV BLD AUTO: 10.7 FL (ref 9.4–12.3)
POTASSIUM SERPL-SCNC: 4 MMOL/L (ref 3.5–5)
PROTEIN UA: NEGATIVE MG/DL
RBC # BLD: 4.37 M/UL (ref 4.2–5.4)
RBC UA: 3 /HPF (ref 0–4)
SODIUM BLD-SCNC: 138 MMOL/L (ref 136–145)
SPECIFIC GRAVITY UA: 1.01 (ref 1–1.03)
TOTAL PROTEIN: 6.1 G/DL (ref 6.6–8.7)
UROBILINOGEN, URINE: 0.2 E.U./DL
WBC # BLD: 11.7 K/UL (ref 4.8–10.8)
WBC UA: 13 /HPF (ref 0–5)

## 2023-01-23 PROCEDURE — 80053 COMPREHEN METABOLIC PANEL: CPT

## 2023-01-23 PROCEDURE — 85025 COMPLETE CBC W/AUTO DIFF WBC: CPT

## 2023-01-23 PROCEDURE — 83690 ASSAY OF LIPASE: CPT

## 2023-01-23 PROCEDURE — 6360000002 HC RX W HCPCS: Performed by: PEDIATRICS

## 2023-01-23 PROCEDURE — 74177 CT ABD & PELVIS W/CONTRAST: CPT

## 2023-01-23 PROCEDURE — 87086 URINE CULTURE/COLONY COUNT: CPT

## 2023-01-23 PROCEDURE — 96374 THER/PROPH/DIAG INJ IV PUSH: CPT

## 2023-01-23 PROCEDURE — 81001 URINALYSIS AUTO W/SCOPE: CPT

## 2023-01-23 PROCEDURE — 99285 EMERGENCY DEPT VISIT HI MDM: CPT

## 2023-01-23 PROCEDURE — 6360000004 HC RX CONTRAST MEDICATION: Performed by: PEDIATRICS

## 2023-01-23 PROCEDURE — 96375 TX/PRO/DX INJ NEW DRUG ADDON: CPT

## 2023-01-23 PROCEDURE — 36415 COLL VENOUS BLD VENIPUNCTURE: CPT

## 2023-01-23 PROCEDURE — 74177 CT ABD & PELVIS W/CONTRAST: CPT | Performed by: RADIOLOGY

## 2023-01-23 PROCEDURE — 84703 CHORIONIC GONADOTROPIN ASSAY: CPT

## 2023-01-23 PROCEDURE — 2580000003 HC RX 258: Performed by: PEDIATRICS

## 2023-01-23 PROCEDURE — 93005 ELECTROCARDIOGRAM TRACING: CPT | Performed by: PEDIATRICS

## 2023-01-23 PROCEDURE — 6370000000 HC RX 637 (ALT 250 FOR IP): Performed by: PEDIATRICS

## 2023-01-23 RX ORDER — MAGNESIUM HYDROXIDE/ALUMINUM HYDROXICE/SIMETHICONE 120; 1200; 1200 MG/30ML; MG/30ML; MG/30ML
30 SUSPENSION ORAL ONCE
Status: COMPLETED | OUTPATIENT
Start: 2023-01-23 | End: 2023-01-23

## 2023-01-23 RX ORDER — LORAZEPAM 2 MG/ML
0.5 INJECTION INTRAMUSCULAR ONCE
Status: COMPLETED | OUTPATIENT
Start: 2023-01-23 | End: 2023-01-23

## 2023-01-23 RX ORDER — OXYCODONE HYDROCHLORIDE 5 MG/1
5 TABLET ORAL EVERY 6 HOURS PRN
Qty: 12 TABLET | Refills: 0 | Status: ON HOLD | OUTPATIENT
Start: 2023-01-23 | End: 2023-01-30 | Stop reason: HOSPADM

## 2023-01-23 RX ORDER — ONDANSETRON 2 MG/ML
4 INJECTION INTRAMUSCULAR; INTRAVENOUS ONCE
Status: COMPLETED | OUTPATIENT
Start: 2023-01-23 | End: 2023-01-23

## 2023-01-23 RX ORDER — KETOROLAC TROMETHAMINE 30 MG/ML
15 INJECTION, SOLUTION INTRAMUSCULAR; INTRAVENOUS ONCE
Status: COMPLETED | OUTPATIENT
Start: 2023-01-23 | End: 2023-01-23

## 2023-01-23 RX ORDER — CEPHALEXIN 500 MG/1
500 CAPSULE ORAL 2 TIMES DAILY
Qty: 14 CAPSULE | Refills: 0 | Status: ON HOLD | OUTPATIENT
Start: 2023-01-23 | End: 2023-01-30 | Stop reason: HOSPADM

## 2023-01-23 RX ORDER — HYDROMORPHONE HYDROCHLORIDE 1 MG/ML
0.5 INJECTION, SOLUTION INTRAMUSCULAR; INTRAVENOUS; SUBCUTANEOUS ONCE
Status: COMPLETED | OUTPATIENT
Start: 2023-01-23 | End: 2023-01-23

## 2023-01-23 RX ORDER — OXYCODONE HYDROCHLORIDE AND ACETAMINOPHEN 5; 325 MG/1; MG/1
1 TABLET ORAL ONCE
Status: COMPLETED | OUTPATIENT
Start: 2023-01-23 | End: 2023-01-23

## 2023-01-23 RX ADMIN — ALUMINUM HYDROXIDE, MAGNESIUM HYDROXIDE, AND SIMETHICONE 30 ML: 200; 200; 20 SUSPENSION ORAL at 23:45

## 2023-01-23 RX ADMIN — LORAZEPAM 0.5 MG: 2 INJECTION INTRAMUSCULAR; INTRAVENOUS at 23:45

## 2023-01-23 RX ADMIN — IOPAMIDOL 90 ML: 755 INJECTION, SOLUTION INTRAVENOUS at 22:22

## 2023-01-23 RX ADMIN — HYDROMORPHONE HYDROCHLORIDE 0.5 MG: 1 INJECTION, SOLUTION INTRAMUSCULAR; INTRAVENOUS; SUBCUTANEOUS at 23:04

## 2023-01-23 RX ADMIN — CEFTRIAXONE 1000 MG: 1 INJECTION, POWDER, FOR SOLUTION INTRAMUSCULAR; INTRAVENOUS at 23:35

## 2023-01-23 RX ADMIN — KETOROLAC TROMETHAMINE 15 MG: 30 INJECTION, SOLUTION INTRAMUSCULAR; INTRAVENOUS at 23:34

## 2023-01-23 RX ADMIN — ONDANSETRON 4 MG: 2 INJECTION INTRAMUSCULAR; INTRAVENOUS at 23:03

## 2023-01-23 RX ADMIN — OXYCODONE HYDROCHLORIDE AND ACETAMINOPHEN 1 TABLET: 5; 325 TABLET ORAL at 23:35

## 2023-01-23 ASSESSMENT — PAIN DESCRIPTION - ORIENTATION
ORIENTATION: RIGHT
ORIENTATION: RIGHT

## 2023-01-23 ASSESSMENT — PAIN DESCRIPTION - LOCATION
LOCATION: ABDOMEN;CHEST
LOCATION: ABDOMEN

## 2023-01-23 ASSESSMENT — PAIN SCALES - GENERAL
PAINLEVEL_OUTOF10: 7
PAINLEVEL_OUTOF10: 10

## 2023-01-23 NOTE — Clinical Note
Albert Singh was seen and treated in our emergency department on 1/23/2023. She may return to work on 01/26/2023. If you have any questions or concerns, please don't hesitate to call.       Claudette Prado MD

## 2023-01-23 NOTE — CARE COORDINATION
[facility] Care Transitions Initial Follow Up Call    Call within 2 business days of discharge: Yes    Care Transition Nurse contacted the patient by telephone to perform post hospital discharge assessment. Verified name and  with patient as identifiers. Provided introduction to self, and explanation of the Care Transition Nurse role. Patient: Nida Sauceda Patient : 1990   MRN: [id number]  Reason for Admission:   Discharge Date: 23 RARS: No data recorded    Last Discharge [address]       Date Complaint Diagnosis Description Type Department Provider    23 Post-op Problem Acute post-operative pain . .. ED to Hosp-Admission (Discharged) (ADMITTED) [facility] [address] [facility], DO; Suki Bell. .. 23  Calculus of gallbladder with chronic cholecystitis without obstruction Admission (Discharged) [facility] OR Genevieve Molina DO              Challenges to be reviewed by the provider   Additional needs identified to be addressed with provider: No  none               Method of communication with provider: none. Spoke with: [id number] [place] Transition Nurse reviewed discharge instructions with patient who verbalized understanding. The patient was given an opportunity to ask questions and does not have any further questions or concerns at this time. Were discharge instructions available to patient? Yes. Reviewed appropriate site of care based on symptoms and resources available to patient including: PCP  Specialist. The patient agrees to contact the PCP office for questions related to their healthcare. Advance Care Planning:   Does patient have an Advance Directive: not on file; education provided. Medication reconciliation was performed with patient, who verbalizes understanding of administration of home medications.  Medications reviewed, 1111F entered: yes    Was patient discharged with a pulse oximeter? no    Non-face-to-face services provided:  Obtained and reviewed discharge summary and/or continuity of care documents    Offered patient enrollment in the Remote Patient Monitoring (RPM) program for in-home monitoring: NA.    Care Transitions 24 Hour Call    Do you have a copy of your discharge instructions?: Yes  Do you have all of your prescriptions and are they filled?: Yes  Have you been contacted by a [facility] Pharmacist?: No  Have you scheduled your follow up appointment?: Yes  How are you going to get to your appointment?: Car - family or friend to transport  Do you feel like you have everything you need to keep you well at home?: Yes  Are you an active caregiver in your home?: No  Care Transitions Interventions         Follow Up : Spoke with patient today for CTN call after discharge from [place]. She returned after outpatient lap laith with uncontrolled pain, kept overnight and discharged home. She says she is doing ok, has her pain meds. Discussed that she will be decreasing the pain medications, using the NSAID and muscle relaxer to assist in pain control. CTN discussed she she should make sure and eat food with NSAID. She says her pain is about a 6. She says her appetite is ok and she is not having any issues with bowels or bladder. She has a AGNIESZKA drain in place. Says it is secured, no slippage that she can tell. Says she is getting 15-20 ml q 12 hours. Site was redressed during ED visit bu nursing staff. Patient has not had Covid vaccine, listed PHCDM and has no LW. She is accepting of CTN calls and f/u. CTN will f/u at a later time. Future Appointments   Date Time Provider Danisha Robledo   9/6/1779  1:47 PM DO LANA Laird [facility]   2/13/2023  3:45 PM YINKA Case [facility] [place]   3/14/2023  2:20 PM Cessinee Lendon Cabot, APRN [facility]       Care Transition Nurse provided contact information. Plan for follow-up call in 5-7 days based on severity of symptoms and risk factors.   Plan for next call: GS f/u, med changes, pain level, AGNIESZKA drain, amounts.     Komal Weems, RN

## 2023-01-24 ENCOUNTER — CARE COORDINATION (OUTPATIENT)
Dept: CASE MANAGEMENT | Age: 33
End: 2023-01-24

## 2023-01-24 ENCOUNTER — TELEPHONE (OUTPATIENT)
Dept: SURGERY | Age: 33
End: 2023-01-24

## 2023-01-24 ENCOUNTER — APPOINTMENT (OUTPATIENT)
Dept: GENERAL RADIOLOGY | Age: 33
DRG: 394 | End: 2023-01-24
Payer: MEDICAID

## 2023-01-24 VITALS
WEIGHT: 293 LBS | SYSTOLIC BLOOD PRESSURE: 119 MMHG | HEIGHT: 68 IN | RESPIRATION RATE: 18 BRPM | TEMPERATURE: 98.1 F | OXYGEN SATURATION: 98 % | HEART RATE: 89 BPM | DIASTOLIC BLOOD PRESSURE: 76 MMHG | BODY MASS INDEX: 44.41 KG/M2

## 2023-01-24 LAB
EKG P AXIS: 36 DEGREES
EKG P-R INTERVAL: 166 MS
EKG Q-T INTERVAL: 384 MS
EKG QRS DURATION: 88 MS
EKG QTC CALCULATION (BAZETT): 391 MS
EKG T AXIS: 51 DEGREES

## 2023-01-24 PROCEDURE — 71045 X-RAY EXAM CHEST 1 VIEW: CPT | Performed by: RADIOLOGY

## 2023-01-24 PROCEDURE — 71045 X-RAY EXAM CHEST 1 VIEW: CPT

## 2023-01-24 PROCEDURE — 93010 ELECTROCARDIOGRAM REPORT: CPT | Performed by: INTERNAL MEDICINE

## 2023-01-24 NOTE — TELEPHONE ENCOUNTER
1/24/23 Patient called in to report her drainage. Stated that is what her paperwork said to do. She reports having 20-30ML every 12 Hours. Please return call.     Callback # [phone]  stacie

## 2023-01-24 NOTE — DISCHARGE INSTRUCTIONS
Return with increasing or severe pain, persistent vomiting, fever greater than 100.5, or other concerns.

## 2023-01-24 NOTE — CARE COORDINATION
[address] [facility] ED Follow Up Call    2023    Patient: Baylee Auguste Patient : 1990   MRN: [id number]  Reason for Admission: Leonora ozuna on 23  Discharge Date: 23     ED F/U; Spoke with patient today for ED f/u. She said she is picking up her pain medication today. She slept through her PCP f/u. CTN advised her to set an alarm so she does not miss her follow up appointments. She said her AGNIESZKA is in place, gotten 30ml out at 9am. She usually drains it BID. She said she is taking a stool softener and Miralax. Says she had a BM yesterday but none today. Says she is eating and staying hydrated. She has pain meds, abx at home. Says she let her pain get out of control and had to come into ED. CTN advised her she should be supplementing with OTC pain relievers as well to start weaning off the opioids, as this is something they will not continue giving her. CTN will follow up with her at a later time.    Care Transitions ED Follow Up    Care Transitions Interventions  Do you have any ongoing symptoms?: No   Do you have all of your prescriptions and are they filled?: Yes   Were you discharged with any Home Care or Post Acute Services or do you currently have any active services?: Yes   Post Acute Services: Home Health         Do you have any needs or concerns that I can assist you with?: No   Identified Barriers: None

## 2023-01-24 NOTE — TELEPHONE ENCOUNTER
I discussed with Dr Arias Downs, she said its ok & to just check on patient & tell her Dr Bud Villanueva is back in office in the morning & Ill inform her. I s/w patient & told her this, she said ok & that she's getting about 30mls out every 12 hours, the color is \"more of a light red, some chunks\".

## 2023-01-24 NOTE — TELEPHONE ENCOUNTER
Forwarding to Dr Lolita Bejarano. (Pt had ROBOTIC ASSISTED LAPAROSCOPIC CHOLECYSTECTOMY WITH ICG on 1/20/23.  Her post op appt is on 2/2/23.)

## 2023-01-25 ENCOUNTER — TELEPHONE (OUTPATIENT)
Dept: SURGERY | Age: 33
End: 2023-01-25

## 2023-01-25 ENCOUNTER — APPOINTMENT (OUTPATIENT)
Dept: NUCLEAR MEDICINE | Age: 33
DRG: 394 | End: 2023-01-25
Payer: MEDICAID

## 2023-01-25 ENCOUNTER — HOSPITAL ENCOUNTER (INPATIENT)
Age: 33
LOS: 5 days | Discharge: HOME | DRG: 394 | End: 2023-01-30
Attending: SURGERY | Admitting: SURGERY
Payer: MEDICAID

## 2023-01-25 ENCOUNTER — APPOINTMENT (OUTPATIENT)
Dept: GENERAL RADIOLOGY | Age: 33
DRG: 394 | End: 2023-01-25
Payer: MEDICAID

## 2023-01-25 ENCOUNTER — APPOINTMENT (OUTPATIENT)
Dept: CT IMAGING | Age: 33
DRG: 394 | End: 2023-01-25
Payer: MEDICAID

## 2023-01-25 DIAGNOSIS — K91.89 BILE LEAK, POSTOPERATIVE: Primary | ICD-10-CM

## 2023-01-25 DIAGNOSIS — K83.8 BILE LEAK, POSTOPERATIVE: Primary | ICD-10-CM

## 2023-01-25 PROBLEM — E66.813 CLASS 3 SEVERE OBESITY DUE TO EXCESS CALORIES IN ADULT: Status: ACTIVE | Noted: 2023-01-25

## 2023-01-25 PROBLEM — E66.01 CLASS 3 SEVERE OBESITY DUE TO EXCESS CALORIES IN ADULT (HCC): Status: ACTIVE | Noted: 2023-01-25

## 2023-01-25 PROBLEM — K83.9 BILE LEAK: Status: ACTIVE | Noted: 2023-01-25

## 2023-01-25 LAB
ALBUMIN SERPL-MCNC: 3.8 G/DL (ref 3.5–5.2)
ALP BLD-CCNC: 135 U/L (ref 35–104)
ALT SERPL-CCNC: 33 U/L (ref 5–33)
ANION GAP SERPL CALCULATED.3IONS-SCNC: 11 MMOL/L (ref 7–19)
AST SERPL-CCNC: 18 U/L (ref 5–32)
BACTERIA: ABNORMAL /HPF
BASOPHILS ABSOLUTE: 0.1 K/UL (ref 0–0.2)
BASOPHILS RELATIVE PERCENT: 0.3 % (ref 0–1)
BILIRUB SERPL-MCNC: 0.6 MG/DL (ref 0.2–1.2)
BILIRUBIN URINE: NEGATIVE
BLOOD, URINE: ABNORMAL
BUN BLDV-MCNC: 11 MG/DL (ref 6–20)
CALCIUM SERPL-MCNC: 9.4 MG/DL (ref 8.6–10)
CHLORIDE BLD-SCNC: 103 MMOL/L (ref 98–111)
CLARITY: ABNORMAL
CO2: 23 MMOL/L (ref 22–29)
COLOR: YELLOW
CREAT SERPL-MCNC: 0.5 MG/DL (ref 0.5–0.9)
EOSINOPHILS ABSOLUTE: 0.1 K/UL (ref 0–0.6)
EOSINOPHILS RELATIVE PERCENT: 0.4 % (ref 0–5)
EPITHELIAL CELLS, UA: ABNORMAL /HPF
GFR SERPL CREATININE-BSD FRML MDRD: >60 ML/MIN/{1.73_M2}
GLUCOSE BLD-MCNC: 103 MG/DL (ref 74–109)
GLUCOSE URINE: NEGATIVE MG/DL
HCG QUALITATIVE: NEGATIVE
HCT VFR BLD CALC: 45.6 % (ref 37–47)
HEMOGLOBIN: 14.3 G/DL (ref 12–16)
IMMATURE GRANULOCYTES #: 0.2 K/UL
KETONES, URINE: ABNORMAL MG/DL
LEUKOCYTE ESTERASE, URINE: ABNORMAL
LIPASE: 13 U/L (ref 13–60)
LYMPHOCYTES ABSOLUTE: 2.9 K/UL (ref 1.1–4.5)
LYMPHOCYTES RELATIVE PERCENT: 14 % (ref 20–40)
MCH RBC QN AUTO: 28.2 PG (ref 27–31)
MCHC RBC AUTO-ENTMCNC: 31.4 G/DL (ref 33–37)
MCV RBC AUTO: 89.9 FL (ref 81–99)
MONOCYTES ABSOLUTE: 1.2 K/UL (ref 0–0.9)
MONOCYTES RELATIVE PERCENT: 6.1 % (ref 0–10)
NEUTROPHILS ABSOLUTE: 16 K/UL (ref 1.5–7.5)
NEUTROPHILS RELATIVE PERCENT: 78.5 % (ref 50–65)
NITRITE, URINE: NEGATIVE
PDW BLD-RTO: 14.6 % (ref 11.5–14.5)
PH UA: 5.5 (ref 5–8)
PLATELET # BLD: 459 K/UL (ref 130–400)
PMV BLD AUTO: 10.7 FL (ref 9.4–12.3)
POTASSIUM SERPL-SCNC: 4.6 MMOL/L (ref 3.5–5)
PROTEIN UA: NEGATIVE MG/DL
RBC # BLD: 5.07 M/UL (ref 4.2–5.4)
RBC UA: ABNORMAL /HPF (ref 0–2)
SARS-COV-2, NAAT: NOT DETECTED
SODIUM BLD-SCNC: 137 MMOL/L (ref 136–145)
SPECIFIC GRAVITY UA: 1.02 (ref 1–1.03)
TOTAL PROTEIN: 7.3 G/DL (ref 6.6–8.7)
UROBILINOGEN, URINE: 0.2 E.U./DL
WBC # BLD: 20.4 K/UL (ref 4.8–10.8)
WBC UA: ABNORMAL /HPF (ref 0–5)

## 2023-01-25 PROCEDURE — 71045 X-RAY EXAM CHEST 1 VIEW: CPT

## 2023-01-25 PROCEDURE — 6360000004 HC RX CONTRAST MEDICATION: Performed by: PHYSICIAN ASSISTANT

## 2023-01-25 PROCEDURE — 80053 COMPREHEN METABOLIC PANEL: CPT

## 2023-01-25 PROCEDURE — 6370000000 HC RX 637 (ALT 250 FOR IP): Performed by: SURGERY

## 2023-01-25 PROCEDURE — 84703 CHORIONIC GONADOTROPIN ASSAY: CPT

## 2023-01-25 PROCEDURE — 78226 HEPATOBILIARY SYSTEM IMAGING: CPT

## 2023-01-25 PROCEDURE — 74177 CT ABD & PELVIS W/CONTRAST: CPT | Performed by: RADIOLOGY

## 2023-01-25 PROCEDURE — 99024 POSTOP FOLLOW-UP VISIT: CPT | Performed by: SURGERY

## 2023-01-25 PROCEDURE — 87635 SARS-COV-2 COVID-19 AMP PRB: CPT

## 2023-01-25 PROCEDURE — 96375 TX/PRO/DX INJ NEW DRUG ADDON: CPT

## 2023-01-25 PROCEDURE — A9537 TC99M MEBROFENIN: HCPCS | Performed by: SURGERY

## 2023-01-25 PROCEDURE — 6360000002 HC RX W HCPCS: Performed by: SURGERY

## 2023-01-25 PROCEDURE — 36415 COLL VENOUS BLD VENIPUNCTURE: CPT

## 2023-01-25 PROCEDURE — 81001 URINALYSIS AUTO W/SCOPE: CPT

## 2023-01-25 PROCEDURE — 1210000000 HC MED SURG R&B

## 2023-01-25 PROCEDURE — 78226 HEPATOBILIARY SYSTEM IMAGING: CPT | Performed by: RADIOLOGY

## 2023-01-25 PROCEDURE — 6360000002 HC RX W HCPCS: Performed by: PHYSICIAN ASSISTANT

## 2023-01-25 PROCEDURE — 83690 ASSAY OF LIPASE: CPT

## 2023-01-25 PROCEDURE — 71045 X-RAY EXAM CHEST 1 VIEW: CPT | Performed by: STUDENT IN AN ORGANIZED HEALTH CARE EDUCATION/TRAINING PROGRAM

## 2023-01-25 PROCEDURE — 99285 EMERGENCY DEPT VISIT HI MDM: CPT

## 2023-01-25 PROCEDURE — 96374 THER/PROPH/DIAG INJ IV PUSH: CPT

## 2023-01-25 PROCEDURE — 2580000003 HC RX 258: Performed by: SURGERY

## 2023-01-25 PROCEDURE — 85025 COMPLETE CBC W/AUTO DIFF WBC: CPT

## 2023-01-25 PROCEDURE — 96376 TX/PRO/DX INJ SAME DRUG ADON: CPT

## 2023-01-25 PROCEDURE — 74177 CT ABD & PELVIS W/CONTRAST: CPT

## 2023-01-25 PROCEDURE — 3430000000 HC RX DIAGNOSTIC RADIOPHARMACEUTICAL: Performed by: SURGERY

## 2023-01-25 RX ORDER — PANTOPRAZOLE SODIUM 40 MG/1
40 TABLET, DELAYED RELEASE ORAL
Status: DISCONTINUED | OUTPATIENT
Start: 2023-01-26 | End: 2023-01-30 | Stop reason: HOSPADM

## 2023-01-25 RX ORDER — SODIUM CHLORIDE, SODIUM LACTATE, POTASSIUM CHLORIDE, CALCIUM CHLORIDE 600; 310; 30; 20 MG/100ML; MG/100ML; MG/100ML; MG/100ML
INJECTION, SOLUTION INTRAVENOUS CONTINUOUS
Status: DISCONTINUED | OUTPATIENT
Start: 2023-01-25 | End: 2023-01-30

## 2023-01-25 RX ORDER — ONDANSETRON 2 MG/ML
4 INJECTION INTRAMUSCULAR; INTRAVENOUS ONCE
Status: COMPLETED | OUTPATIENT
Start: 2023-01-25 | End: 2023-01-25

## 2023-01-25 RX ORDER — FENTANYL CITRATE 50 UG/ML
25 INJECTION, SOLUTION INTRAMUSCULAR; INTRAVENOUS
Status: DISCONTINUED | OUTPATIENT
Start: 2023-01-25 | End: 2023-01-26 | Stop reason: ALTCHOICE

## 2023-01-25 RX ORDER — MORPHINE SULFATE 4 MG/ML
INJECTION, SOLUTION INTRAMUSCULAR; INTRAVENOUS
Status: DISCONTINUED
Start: 2023-01-25 | End: 2023-01-25 | Stop reason: WASHOUT

## 2023-01-25 RX ORDER — ENOXAPARIN SODIUM 100 MG/ML
40 INJECTION SUBCUTANEOUS DAILY
Status: DISCONTINUED | OUTPATIENT
Start: 2023-01-26 | End: 2023-01-26 | Stop reason: CLARIF

## 2023-01-25 RX ORDER — HYDROMORPHONE HYDROCHLORIDE 1 MG/ML
0.25 INJECTION, SOLUTION INTRAMUSCULAR; INTRAVENOUS; SUBCUTANEOUS
Status: DISCONTINUED | OUTPATIENT
Start: 2023-01-25 | End: 2023-01-26

## 2023-01-25 RX ORDER — ACETAMINOPHEN 500 MG
1000 TABLET ORAL EVERY 8 HOURS SCHEDULED
Status: DISCONTINUED | OUTPATIENT
Start: 2023-01-25 | End: 2023-01-30 | Stop reason: HOSPADM

## 2023-01-25 RX ORDER — MAGNESIUM SULFATE 1 G/100ML
1000 INJECTION INTRAVENOUS PRN
Status: DISCONTINUED | OUTPATIENT
Start: 2023-01-25 | End: 2023-01-30 | Stop reason: HOSPADM

## 2023-01-25 RX ORDER — HYDROMORPHONE HYDROCHLORIDE 1 MG/ML
0.5 INJECTION, SOLUTION INTRAMUSCULAR; INTRAVENOUS; SUBCUTANEOUS
Status: DISCONTINUED | OUTPATIENT
Start: 2023-01-25 | End: 2023-01-26

## 2023-01-25 RX ORDER — POTASSIUM CHLORIDE 7.45 MG/ML
10 INJECTION INTRAVENOUS PRN
Status: DISCONTINUED | OUTPATIENT
Start: 2023-01-25 | End: 2023-01-30 | Stop reason: HOSPADM

## 2023-01-25 RX ORDER — ONDANSETRON 2 MG/ML
INJECTION INTRAMUSCULAR; INTRAVENOUS
Status: DISPENSED
Start: 2023-01-25 | End: 2023-01-26

## 2023-01-25 RX ORDER — ONDANSETRON 2 MG/ML
4 INJECTION INTRAMUSCULAR; INTRAVENOUS EVERY 6 HOURS PRN
Status: DISCONTINUED | OUTPATIENT
Start: 2023-01-25 | End: 2023-01-30 | Stop reason: HOSPADM

## 2023-01-25 RX ORDER — METHOCARBAMOL 500 MG/1
500 TABLET, FILM COATED ORAL 3 TIMES DAILY
Status: DISCONTINUED | OUTPATIENT
Start: 2023-01-25 | End: 2023-01-30 | Stop reason: HOSPADM

## 2023-01-25 RX ORDER — KETOROLAC TROMETHAMINE 30 MG/ML
30 INJECTION, SOLUTION INTRAMUSCULAR; INTRAVENOUS EVERY 6 HOURS
Status: DISPENSED | OUTPATIENT
Start: 2023-01-25 | End: 2023-01-28

## 2023-01-25 RX ORDER — FENTANYL CITRATE 50 UG/ML
50 INJECTION, SOLUTION INTRAMUSCULAR; INTRAVENOUS ONCE
Status: COMPLETED | OUTPATIENT
Start: 2023-01-25 | End: 2023-01-25

## 2023-01-25 RX ORDER — BUSPIRONE HYDROCHLORIDE 15 MG/1
15 TABLET ORAL 3 TIMES DAILY
Status: DISCONTINUED | OUTPATIENT
Start: 2023-01-25 | End: 2023-01-30 | Stop reason: HOSPADM

## 2023-01-25 RX ORDER — PROMETHAZINE HYDROCHLORIDE 12.5 MG/1
12.5 TABLET ORAL EVERY 6 HOURS PRN
Status: DISCONTINUED | OUTPATIENT
Start: 2023-01-25 | End: 2023-01-30 | Stop reason: HOSPADM

## 2023-01-25 RX ADMIN — FENTANYL CITRATE 25 MCG: 50 INJECTION INTRAMUSCULAR; INTRAVENOUS at 20:28

## 2023-01-25 RX ADMIN — PIPERACILLIN AND TAZOBACTAM 4500 MG: 4; .5 INJECTION, POWDER, FOR SOLUTION INTRAVENOUS at 18:03

## 2023-01-25 RX ADMIN — KETOROLAC TROMETHAMINE 30 MG: 30 INJECTION, SOLUTION INTRAMUSCULAR; INTRAVENOUS at 21:39

## 2023-01-25 RX ADMIN — FENTANYL CITRATE 25 MCG: 50 INJECTION INTRAMUSCULAR; INTRAVENOUS at 17:56

## 2023-01-25 RX ADMIN — SODIUM CHLORIDE, POTASSIUM CHLORIDE, SODIUM LACTATE AND CALCIUM CHLORIDE: 600; 310; 30; 20 INJECTION, SOLUTION INTRAVENOUS at 21:43

## 2023-01-25 RX ADMIN — ONDANSETRON 4 MG: 2 INJECTION INTRAMUSCULAR; INTRAVENOUS at 16:04

## 2023-01-25 RX ADMIN — Medication 4 MILLICURIE: at 14:08

## 2023-01-25 RX ADMIN — BUSPIRONE HYDROCHLORIDE 15 MG: 15 TABLET ORAL at 21:40

## 2023-01-25 RX ADMIN — IOPAMIDOL 70 ML: 755 INJECTION, SOLUTION INTRAVENOUS at 18:19

## 2023-01-25 RX ADMIN — ACETAMINOPHEN 1000 MG: 500 TABLET, FILM COATED ORAL at 21:40

## 2023-01-25 RX ADMIN — HYDROMORPHONE HYDROCHLORIDE 0.5 MG: 1 INJECTION, SOLUTION INTRAMUSCULAR; INTRAVENOUS; SUBCUTANEOUS at 22:36

## 2023-01-25 RX ADMIN — METHOCARBAMOL 500 MG: 500 TABLET ORAL at 21:39

## 2023-01-25 RX ADMIN — FENTANYL CITRATE 25 MCG: 50 INJECTION INTRAMUSCULAR; INTRAVENOUS at 23:50

## 2023-01-25 RX ADMIN — FENTANYL CITRATE 50 MCG: 50 INJECTION INTRAMUSCULAR; INTRAVENOUS at 16:08

## 2023-01-25 ASSESSMENT — ENCOUNTER SYMPTOMS
NAUSEA: 1
NAUSEA: 0
COUGH: 0
EYE DISCHARGE: 0
EYE REDNESS: 0
EYE PAIN: 0
APNEA: 0
COLOR CHANGE: 0
DIARRHEA: 0
SORE THROAT: 0
BACK PAIN: 0
ABDOMINAL PAIN: 1
CHEST TIGHTNESS: 0
VOMITING: 0
CONSTIPATION: 0
VOMITING: 1
PHOTOPHOBIA: 0
ABDOMINAL DISTENTION: 0
ABDOMINAL DISTENTION: 1
RHINORRHEA: 0
SHORTNESS OF BREATH: 0

## 2023-01-25 ASSESSMENT — PAIN DESCRIPTION - LOCATION
LOCATION: ABDOMEN

## 2023-01-25 ASSESSMENT — PAIN - FUNCTIONAL ASSESSMENT
PAIN_FUNCTIONAL_ASSESSMENT: 0-10
PAIN_FUNCTIONAL_ASSESSMENT: PREVENTS OR INTERFERES SOME ACTIVE ACTIVITIES AND ADLS
PAIN_FUNCTIONAL_ASSESSMENT: PREVENTS OR INTERFERES SOME ACTIVE ACTIVITIES AND ADLS
PAIN_FUNCTIONAL_ASSESSMENT: ACTIVITIES ARE NOT PREVENTED

## 2023-01-25 ASSESSMENT — LIFESTYLE VARIABLES
HOW MANY STANDARD DRINKS CONTAINING ALCOHOL DO YOU HAVE ON A TYPICAL DAY: PATIENT DOES NOT DRINK
HOW OFTEN DO YOU HAVE A DRINK CONTAINING ALCOHOL: NEVER

## 2023-01-25 ASSESSMENT — PAIN SCALES - GENERAL
PAINLEVEL_OUTOF10: 10

## 2023-01-25 ASSESSMENT — PAIN DESCRIPTION - DESCRIPTORS
DESCRIPTORS: ACHING
DESCRIPTORS: CRUSHING;DISCOMFORT;STABBING
DESCRIPTORS: ACHING;CRAMPING;DISCOMFORT

## 2023-01-25 ASSESSMENT — PAIN DESCRIPTION - ORIENTATION
ORIENTATION: LEFT
ORIENTATION: RIGHT
ORIENTATION: RIGHT

## 2023-01-25 NOTE — H&P
111 [facility] Surgery History & Physical    Chief Complaint:  Chief Complaint   Patient presents with    Post-op Problem     Pt had cholecystectomy on Fri, pt states Dr. Alesia Dye advised evaluation. Pt states she began throwing up bile last night, and is having issues with her drain not closing. ABCs are intact. Skin wpd. A&ox4. SUBJECTIVE:  Ms. Haynes Crew is a 29 y.o. female who presents today with continued pain since her surgery Friday. This is her third trip to the emergency room since Friday. She states she was at work and had vomiting and worsening pain. The pain is severe and sharp and in the right upper abdomen. She has a AGNIESZKA drain, and states it was light yellow/red but now is brown-like and thinner. No fever at home. Past Medical History:   Diagnosis Date    Anxiety     GERD (gastroesophageal reflux disease)     Headache     History of alcohol abuse     sober since 2022     Past Surgical History:   Procedure Laterality Date     SECTION      CHOLECYSTECTOMY      CHOLECYSTECTOMY, LAPAROSCOPIC N/A 2023    ROBOTIC ASSISTED LAPAROSCOPIC CHOLECYSTECTOMY WITH ICG performed by Abraham Butler DO at [facility] OR    MYRINGOTOMY W/ TUBES      TUBAL LIGATION       Current Facility-Administered Medications   Medication Dose Route Frequency Provider Last Rate Last Admin    ondansetron (ZOFRAN) 4 MG/2ML injection             piperacillin-tazobactam (ZOSYN) 3,375 mg in sodium chloride 0.9 % 50 mL IVPB ([id number])  3,375 mg IntraVENous Once SwapnaAZUL Pop        piperacillin-tazobactam (ZOSYN) 3,375 mg in sodium chloride 0.9 % 50 mL IVPB ([id number])  3,375 mg IntraVENous B5R Genevieve Molina DO         Current Outpatient Medications   Medication Sig Dispense Refill    cephALEXin (KEFLEX) 500 MG capsule Take 1 capsule by mouth 2 times daily for 7 days 14 capsule 0    oxyCODONE (ROXICODONE) 5 MG immediate release tablet Take 1 tablet by mouth every 6 hours as needed for Pain for up to 3 days.  Intended supply: 3 days. Take lowest dose possible to manage pain Max Daily Amount: 20 mg 12 tablet 0    methocarbamol (ROBAXIN) 500 MG tablet Take 1 tablet by mouth 3 times daily for 5 days 15 tablet 0    ibuprofen (ADVIL;MOTRIN) 800 MG tablet Take 1 tablet by mouth 3 times daily (with meals) for 5 days 15 tablet 0    omeprazole (PRILOSEC) 20 MG delayed release capsule Take 1 capsule by mouth every morning (before breakfast) 90 capsule 1    Atogepant (QULIPTA) 10 MG TABS Take 10 mg by mouth daily 30 tablet 0    busPIRone (BUSPAR) 15 MG tablet Take 15 mg by mouth 3 times daily      lamoTRIgine (LAMICTAL) 100 MG tablet Take 150 mg by mouth daily       Allergies: Morphine    Family History   Problem Relation Age of Onset    High Blood Pressure Mother     Heart Disease Father     High Blood Pressure Father     Cancer Paternal Grandmother     Colon Polyps Neg Hx     Colon Cancer Neg Hx        Social History     Tobacco Use    Smoking status: Former     Packs/day: 0.50     Types: Cigarettes     Quit date: 2022     Years since quittin.1    Smokeless tobacco: Never   Substance Use Topics    Alcohol use: Not Currently     Comment: sober 2022       Review of Systems   Constitutional:  Positive for activity change, appetite change and fatigue. Negative for fever and unexpected weight change. HENT:  Negative for hearing loss, nosebleeds and sore throat. Eyes:  Negative for pain, redness and visual disturbance. Respiratory:  Negative for cough, chest tightness and shortness of breath. Cardiovascular:  Negative for chest pain, palpitations and leg swelling. Gastrointestinal:  Positive for abdominal distention and abdominal pain. Negative for constipation, diarrhea, nausea and vomiting. Endocrine: Negative for cold intolerance, heat intolerance and polydipsia. Genitourinary:  Negative for difficulty urinating, frequency and urgency. Musculoskeletal:  Negative for back pain, joint swelling and neck pain. Skin:  Negative for color change, rash and wound. Allergic/Immunologic: Negative for environmental allergies and food allergies. Neurological:  Negative for seizures, light-headedness and headaches. Hematological:  Negative for adenopathy. Does not bruise/bleed easily. Psychiatric/Behavioral:  Negative for confusion, sleep disturbance and suicidal ideas. OBJECTIVE:  BP (!) 153/104   Pulse 87   Temp 98.4 °F (36.9 °C) (Temporal)   Resp 18   LMP 01/09/2023   SpO2 98%   CONSTITUTIONAL: Alert, appropriate, no acute distress. Obese. SKIN: warm, dry with no rashes or lesions  HEENT: NCAT, Non icteric, PERR. Trachea Midline. CHEST/LUNGS: CTA bilaterally. Normal respiratory effort. CARDIOVASCULAR: RRR, No edema. ABDOMEN: soft, ND, tender abdomen with healing well incisions. AGNIESZKA with bilious output. NEUROLOGIC: CN II-XI grossly intact, no motor or sensory deficits   MUSCULOSKELETAL: No clubbing or cyanosis. PSYCHIATRIC:  Normal Mood and Affect. Alert and Oriented. LABS:  CBC:   Recent Labs     01/23/23  2157 01/25/23  1511   WBC 11.7* 20.4*   HGB 12.3 14.3    459*     BMP:    Recent Labs     01/23/23  2157 01/25/23  1511    137   K 4.0 4.6    103   CO2 25 23   BUN 12 11   CREATININE 0.6 0.5   GLUCOSE 87 103       ASSESSMENT:  Active Problems:    Bile leak    Class 3 severe obesity due to excess calories in [facility])  Resolved Problems:    * No resolved hospital problems. *      PLAN:  HIDA scan reviewed, concerned for bile leak and this is supported with presence of bile in the AGNIESZKA bulb. Admit. NPO. Start Zosyn q8hr. Discussed case with Dr. Colt Ma who will perform ERCP tomorrow. Can start clear liquid diet following procedure.      Esau Show, DO   Electronically Signed on 1/25/2023 at 5:22 PM

## 2023-01-25 NOTE — ED NOTES
Nuc med called pt is pain during scan.  BRINA Alanis updated and meds ordered      Yves Marinelli RN  01/25/23 [id number]

## 2023-01-25 NOTE — TELEPHONE ENCOUNTER
1/25/2023 Patient called and stated she has been vomiting yellow bile all night and has not gotten any rest.    Callback # [phone]  stacie

## 2023-01-25 NOTE — ED PROVIDER NOTES
[facility]  eMERGENCYdEPARTMENT eNCOUnter      Pt Name: Cliff Ortiz  MRN: [id number]  [place] 1990  Date of evaluation: 1/25/2023  Provider:AZUL Gottlieb    CHIEF COMPLAINT       Chief Complaint   Patient presents with    Post-op Problem     Pt had cholecystectomy on Fri, pt states Dr. Vadim Tellez advised evaluation. Pt states she began throwing up bile last night, and is having issues with her drain not closing. ABCs are intact. Skin wpd. A&ox4. HISTORY OF PRESENT ILLNESS  (Location/Symptom, Timing/Onset, Context/Setting, Quality, Duration, Modifying Factors, Severity.)   Cliff Ortiz is a 29 y.o. female who presents to the emergency department with complaints of post op pain vomiting bile Dr Vadim Tellez did lap laith sent here going to radiology for nuc med hepatobiliary imaging. She has nausea pain. No fever or chills. Dr Vadim Tellez is aware she is here. Will get everything back and discuss. Has a j drain in at this time. Rule out bile leak main priority. 20 WBC on work noted. HPI    Nursing Notes were reviewed and I agree. REVIEW OF SYSTEMS    (2-9 systems for level 4, 10 or more for level 5)     Review of Systems   Constitutional:  Negative for activity change, appetite change, chills and fever. HENT:  Negative for congestion, postnasal drip, rhinorrhea and sore throat. Eyes:  Negative for photophobia, pain, discharge and visual disturbance. Respiratory:  Negative for apnea, cough and shortness of breath. Cardiovascular:  Negative for chest pain and leg swelling. Gastrointestinal:  Positive for abdominal pain, nausea and vomiting. Negative for abdominal distention. Genitourinary:  Negative for vaginal bleeding. Musculoskeletal:  Negative for arthralgias, back pain, joint swelling, neck pain and neck stiffness. Skin:  Negative for color change and rash. Neurological:  Negative for dizziness, syncope, facial asymmetry and headaches.    Hematological:  Negative for adenopathy. Does not bruise/bleed easily. Psychiatric/Behavioral:  Negative for agitation, behavioral problems and confusion. Except as noted above the remainder of the review of systems was reviewed and negative. PAST MEDICAL HISTORY     Past Medical History:   Diagnosis Date    Anxiety     GERD (gastroesophageal reflux disease)     Headache     History of alcohol abuse     sober since 2022         SURGICAL HISTORY       Past Surgical History:   Procedure Laterality Date     SECTION      CHOLECYSTECTOMY      CHOLECYSTECTOMY, LAPAROSCOPIC N/A 2023    ROBOTIC ASSISTED LAPAROSCOPIC CHOLECYSTECTOMY WITH ICG performed by Bernabe Mask, DO at [facility] OR    MYRINGOTOMY W/ TUBES      TUBAL LIGATION           CURRENT MEDICATIONS       Previous Medications    ATOGEPANT (QULIPTA) 10 MG TABS    Take 10 mg by mouth daily    BUSPIRONE (BUSPAR) 15 MG TABLET    Take 15 mg by mouth 3 times daily    CEPHALEXIN (KEFLEX) 500 MG CAPSULE    Take 1 capsule by mouth 2 times daily for 7 days    IBUPROFEN (ADVIL;MOTRIN) 800 MG TABLET    Take 1 tablet by mouth 3 times daily (with meals) for 5 days    LAMOTRIGINE (LAMICTAL) 100 MG TABLET    Take 150 mg by mouth daily    METHOCARBAMOL (ROBAXIN) 500 MG TABLET    Take 1 tablet by mouth 3 times daily for 5 days    OMEPRAZOLE (PRILOSEC) 20 MG DELAYED RELEASE CAPSULE    Take 1 capsule by mouth every morning (before breakfast)    OXYCODONE (ROXICODONE) 5 MG IMMEDIATE RELEASE TABLET    Take 1 tablet by mouth every 6 hours as needed for Pain for up to 3 days. Intended supply: 3 days.  Take lowest dose possible to manage pain Max Daily Amount: 20 mg       ALLERGIES     Morphine    FAMILY HISTORY       Family History   Problem Relation Age of Onset    High Blood Pressure Mother     Heart Disease Father     High Blood Pressure Father     Cancer Paternal Grandmother     Colon Polyps Neg Hx     Colon Cancer Neg Hx           SOCIAL HISTORY       Social History     Socioeconomic History    Marital status: Single     Spouse name: None    Number of children: None    Years of education: None    Highest education level: None   Tobacco Use    Smoking status: Former     Packs/day: 0.50     Types: Cigarettes     Quit date: 2022     Years since quittin.1    Smokeless tobacco: Never   Vaping Use    Vaping Use: Every day    Substances: Nicotine, Flavoring    Devices: Refillable tank   Substance and Sexual Activity    Alcohol use: Not Currently     Comment: sober 2022    Drug use: Not Currently     Social Determinants of Health     Financial Resource Strain: Medium Risk    Difficulty of Paying Living Expenses: Somewhat hard   Food Insecurity: No Food Insecurity    Worried About Running Out of Food in the Last Year: Never true    Ran Out of Food in the Last Year: Never true   Physical Activity: Insufficiently Active    Days of Exercise per Week: 3 days    Minutes of Exercise per Session: 30 min   Intimate Partner Violence: Not At Risk    Fear of Current or Ex-Partner: No    Emotionally Abused: No    Physically Abused: No    Sexually Abused: No       SCREENINGS    Pollard Coma Scale  Eye Opening: Spontaneous  Best Verbal Response: Oriented  Best Motor Response: Obeys commands  Marcelo Coma Scale Score: 15      PHYSICAL EXAM    (up to 7 forlevel 4, 8 or more for level 5)     ED Triage Vitals [23 1440]   BP Temp Temp Source Heart Rate Resp SpO2 Height Weight   (!) 153/104 98.4 °F (36.9 °C) Temporal 87 18 98 % -- --       Physical Exam  Vitals and nursing note reviewed. Constitutional:       General: She is not in acute distress. Appearance: Normal appearance. She is well-developed. She is not diaphoretic. HENT:      Head: Normocephalic and atraumatic. Right Ear: External ear normal.      Left Ear: External ear normal.      Mouth/Throat:      Pharynx: No oropharyngeal exudate. Eyes:      General:         Right eye: No discharge. Left eye: No discharge. Pupils: Pupils are equal, round, and reactive to light. Neck:      Thyroid: No thyromegaly. Cardiovascular:      Rate and Rhythm: Normal rate and regular rhythm. Pulses: Normal pulses. Heart sounds: Normal heart sounds. No murmur heard. No friction rub. Pulmonary:      Effort: Pulmonary effort is normal. No respiratory distress. Breath sounds: Normal breath sounds. No stridor. No wheezing. Abdominal:      General: Bowel sounds are normal. There is no distension. Palpations: Abdomen is soft. Tenderness: There is abdominal tenderness. There is guarding. Musculoskeletal:         General: Normal range of motion. Cervical back: Normal range of motion and neck supple. Skin:     General: Skin is warm and dry. Capillary Refill: Capillary refill takes less than 2 seconds. Findings: No rash. Neurological:      Mental Status: She is alert and oriented to person, place, and time. Cranial Nerves: No cranial nerve deficit. Sensory: No sensory deficit. Coordination: Coordination normal.   Psychiatric:         Behavior: Behavior normal.         Thought Content:  Thought content normal.         DIAGNOSTIC RESULTS     RADIOLOGY:   Non-plain film images such as CT, Ultrasound and MRI are read by the radiologist. Plain radiographic images are visualized and preliminarilyinterpreted by No att. providers found with the below findings:      Interpretation per the Radiologist below, if available at the time of this note:    NM HEPATOBILIARY    (Results Pending)   XR CHEST PORTABLE    (Results Pending)   CT ABDOMEN PELVIS W IV CONTRAST Additional Contrast? None    (Results Pending)       LABS:  Labs Reviewed   CBC WITH AUTO DIFFERENTIAL - Abnormal; Notable for the following components:       Result Value    WBC 20.4 (*)     MCHC 31.4 (*)     RDW 14.6 (*)     Platelets 251 (*)     Neutrophils % 78.5 (*)     Lymphocytes % 14.0 (*)     Neutrophils Absolute 16.0 (*) Monocytes Absolute 1.20 (*)     All other components within normal limits   COMPREHENSIVE METABOLIC PANEL - Abnormal; Notable for the following components:    Alkaline Phosphatase 135 (*)     All other components within normal limits   COVID-19, RAPID   LIPASE   HCG, SERUM, QUALITATIVE   URINALYSIS WITH REFLEX TO CULTURE       All other labs were within normal range or notreturned as of this dictation. RE-ASSESSMENT          EMERGENCY DEPARTMENT COURSE and DIFFERENTIAL DIAGNOSIS/MDM:   Vitals:    Vitals:    01/25/23 1440   BP: (!) 153/104   Pulse: 87   Resp: 18   Temp: 98.4 °F (36.9 °C)   TempSrc: Temporal   SpO2: 98%         MDM  Number of Diagnoses or Management Options  Bile leak, postoperative: new, needed workup     Amount and/or Complexity of Data Reviewed  Clinical lab tests: reviewed  Tests in the radiology section of CPT®: reviewed  Tests in the medicine section of CPT®: reviewed  Discuss the patient with other providers: yes    Plan for admission under Dr Vadim Tellez. She has seen the patient requests GI consult Dr Duran Ng potential ERCP zosyn has been ordered CT scan also ordered to rule out any other infectious concern. Have placed these orders. Plan for NPO until imaging results. Guarded prognosis. PROCEDURES:    Procedures      FINAL IMPRESSION      1. Bile leak, postoperative          DISPOSITION/PLAN   DISPOSITION Decision To Admit 01/25/2023 05:08:55 PM      PATIENT REFERRED TO:  No follow-up provider specified.     DISCHARGE MEDICATIONS:  New Prescriptions    No medications on file       (Please note that portions of this note were completed with a voice recognition program.  Efforts were made to edit the dictations but occasionallywords are mis-transcribed.)    Tika Le, [address] [place] [address]  01/25/23 8860

## 2023-01-25 NOTE — LETTER
[facility] 5 Surg Services  [facility] [address] [id number]  Phone: [phone]             January 30, 2023    Patient: Michelle Phan   YOB: 1990   Date of Visit: 1/25/2023       To Whom It May Concern:    Michelle Phan was seen and treated in our facility  beginning 1/25/2023 until 1/30/2023}.        Sincerely,       Franchesca Sen RN         Signature:__________________________________

## 2023-01-25 NOTE — PROGRESS NOTES
Pharmacy Adjustment per [facility] protocol    Ying Andrew is a 29 y.o. female. Pharmacy has adjusted medications per [facility] protocol. Recent Labs     01/23/23  2157 01/25/23  1511   BUN 12 11       Recent Labs     01/23/23  2157 01/25/23  1511   CREATININE 0.6 0.5       Estimated Creatinine Clearance: 244 mL/min (based on SCr of 0.5 mg/dL).     Height:   Ht Readings from Last 1 Encounters:   01/23/23 5' 8\" (1.727 m)     Weight:  Wt Readings from Last 1 Encounters:   01/23/23 (!) 307 lb (139.3 kg)         Plan: Adjust the following medications based on [facility] protocol:           Zosyn to 4500 mg IV once over 30 minutes followed by 3375 mg IV every 8 hours extended infusion over 240 minutes    ATUL TORRES, PHARM D, 1/25/2023, 5:34 PM

## 2023-01-26 ENCOUNTER — ANESTHESIA EVENT (OUTPATIENT)
Dept: ENDOSCOPY | Age: 33
End: 2023-01-26
Payer: MEDICAID

## 2023-01-26 ENCOUNTER — APPOINTMENT (OUTPATIENT)
Dept: GENERAL RADIOLOGY | Age: 33
DRG: 394 | End: 2023-01-26
Payer: MEDICAID

## 2023-01-26 ENCOUNTER — ANESTHESIA (OUTPATIENT)
Dept: ENDOSCOPY | Age: 33
End: 2023-01-26
Payer: MEDICAID

## 2023-01-26 ENCOUNTER — TELEPHONE (OUTPATIENT)
Dept: GASTROENTEROLOGY | Age: 33
End: 2023-01-26

## 2023-01-26 PROBLEM — K91.89 BILE LEAK, POSTOPERATIVE: Status: ACTIVE | Noted: 2023-01-25

## 2023-01-26 PROBLEM — K83.8 BILE LEAK, POSTOPERATIVE: Status: ACTIVE | Noted: 2023-01-25

## 2023-01-26 PROBLEM — R10.11 RIGHT UPPER QUADRANT ABDOMINAL PAIN: Status: ACTIVE | Noted: 2023-01-26

## 2023-01-26 LAB
ALBUMIN SERPL-MCNC: 3.7 G/DL (ref 3.5–5.2)
ALP BLD-CCNC: 134 U/L (ref 35–104)
ALT SERPL-CCNC: 38 U/L (ref 5–33)
ANION GAP SERPL CALCULATED.3IONS-SCNC: 15 MMOL/L (ref 7–19)
AST SERPL-CCNC: 25 U/L (ref 5–32)
BASOPHILS ABSOLUTE: 0.1 K/UL (ref 0–0.2)
BASOPHILS RELATIVE PERCENT: 0.3 % (ref 0–1)
BILIRUB SERPL-MCNC: 0.6 MG/DL (ref 0.2–1.2)
BUN BLDV-MCNC: 14 MG/DL (ref 6–20)
CALCIUM SERPL-MCNC: 9.2 MG/DL (ref 8.6–10)
CHLORIDE BLD-SCNC: 101 MMOL/L (ref 98–111)
CO2: 20 MMOL/L (ref 22–29)
CREAT SERPL-MCNC: 0.5 MG/DL (ref 0.5–0.9)
EOSINOPHILS ABSOLUTE: 0.1 K/UL (ref 0–0.6)
EOSINOPHILS RELATIVE PERCENT: 0.3 % (ref 0–5)
GFR SERPL CREATININE-BSD FRML MDRD: >60 ML/MIN/{1.73_M2}
GLUCOSE BLD-MCNC: 91 MG/DL (ref 74–109)
HCT VFR BLD CALC: 45.6 % (ref 37–47)
HEMOGLOBIN: 14.7 G/DL (ref 12–16)
IMMATURE GRANULOCYTES #: 0.2 K/UL
LYMPHOCYTES ABSOLUTE: 2.2 K/UL (ref 1.1–4.5)
LYMPHOCYTES RELATIVE PERCENT: 10 % (ref 20–40)
MCH RBC QN AUTO: 28.7 PG (ref 27–31)
MCHC RBC AUTO-ENTMCNC: 32.2 G/DL (ref 33–37)
MCV RBC AUTO: 88.9 FL (ref 81–99)
MONOCYTES ABSOLUTE: 1.6 K/UL (ref 0–0.9)
MONOCYTES RELATIVE PERCENT: 7.2 % (ref 0–10)
NEUTROPHILS ABSOLUTE: 18.2 K/UL (ref 1.5–7.5)
NEUTROPHILS RELATIVE PERCENT: 81.5 % (ref 50–65)
PDW BLD-RTO: 14.6 % (ref 11.5–14.5)
PLATELET # BLD: 297 K/UL (ref 130–400)
PLATELET SLIDE REVIEW: ADEQUATE
PMV BLD AUTO: 11.4 FL (ref 9.4–12.3)
POTASSIUM SERPL-SCNC: 4.7 MMOL/L (ref 3.5–5)
RBC # BLD: 5.13 M/UL (ref 4.2–5.4)
SODIUM BLD-SCNC: 136 MMOL/L (ref 136–145)
TOTAL PROTEIN: 6.2 G/DL (ref 6.6–8.7)
URINE CULTURE, ROUTINE: NORMAL
WBC # BLD: 22.4 K/UL (ref 4.8–10.8)

## 2023-01-26 PROCEDURE — 99024 POSTOP FOLLOW-UP VISIT: CPT | Performed by: SURGERY

## 2023-01-26 PROCEDURE — 2580000003 HC RX 258: Performed by: NURSE ANESTHETIST, CERTIFIED REGISTERED

## 2023-01-26 PROCEDURE — 6360000002 HC RX W HCPCS: Performed by: SURGERY

## 2023-01-26 PROCEDURE — C1769 GUIDE WIRE: HCPCS | Performed by: INTERNAL MEDICINE

## 2023-01-26 PROCEDURE — 2580000003 HC RX 258: Performed by: ANESTHESIOLOGY

## 2023-01-26 PROCEDURE — 2709999900 HC NON-CHARGEABLE SUPPLY: Performed by: INTERNAL MEDICINE

## 2023-01-26 PROCEDURE — 2500000003 HC RX 250 WO HCPCS: Performed by: NURSE ANESTHETIST, CERTIFIED REGISTERED

## 2023-01-26 PROCEDURE — 85025 COMPLETE CBC W/AUTO DIFF WBC: CPT

## 2023-01-26 PROCEDURE — 94150 VITAL CAPACITY TEST: CPT

## 2023-01-26 PROCEDURE — C2625 STENT, NON-COR, TEM W/DEL SY: HCPCS | Performed by: INTERNAL MEDICINE

## 2023-01-26 PROCEDURE — 1210000000 HC MED SURG R&B

## 2023-01-26 PROCEDURE — 6370000000 HC RX 637 (ALT 250 FOR IP): Performed by: INTERNAL MEDICINE

## 2023-01-26 PROCEDURE — 7100000000 HC PACU RECOVERY - FIRST 15 MIN: Performed by: INTERNAL MEDICINE

## 2023-01-26 PROCEDURE — 3700000001 HC ADD 15 MINUTES (ANESTHESIA): Performed by: INTERNAL MEDICINE

## 2023-01-26 PROCEDURE — 6360000002 HC RX W HCPCS: Performed by: NURSE ANESTHETIST, CERTIFIED REGISTERED

## 2023-01-26 PROCEDURE — BF101ZZ FLUOROSCOPY OF BILE DUCTS USING LOW OSMOLAR CONTRAST: ICD-10-PCS | Performed by: INTERNAL MEDICINE

## 2023-01-26 PROCEDURE — 6370000000 HC RX 637 (ALT 250 FOR IP): Performed by: SURGERY

## 2023-01-26 PROCEDURE — 0F798DZ DILATION OF COMMON BILE DUCT WITH INTRALUMINAL DEVICE, VIA NATURAL OR ARTIFICIAL OPENING ENDOSCOPIC: ICD-10-PCS | Performed by: INTERNAL MEDICINE

## 2023-01-26 PROCEDURE — 94760 N-INVAS EAR/PLS OXIMETRY 1: CPT

## 2023-01-26 PROCEDURE — 2580000003 HC RX 258: Performed by: SURGERY

## 2023-01-26 PROCEDURE — 74328 X-RAY BILE DUCT ENDOSCOPY: CPT

## 2023-01-26 PROCEDURE — 3700000000 HC ANESTHESIA ATTENDED CARE: Performed by: INTERNAL MEDICINE

## 2023-01-26 PROCEDURE — 3609018800 HC ERCP DX COLLECTION SPECIMEN BRUSHING/WASHING: Performed by: INTERNAL MEDICINE

## 2023-01-26 PROCEDURE — 7100000001 HC PACU RECOVERY - ADDTL 15 MIN: Performed by: INTERNAL MEDICINE

## 2023-01-26 PROCEDURE — 80053 COMPREHEN METABOLIC PANEL: CPT

## 2023-01-26 PROCEDURE — 36415 COLL VENOUS BLD VENIPUNCTURE: CPT

## 2023-01-26 DEVICE — BILIARY STENT WITH NAVIFLEXTM RX DELIVERY SYSTEM
Type: IMPLANTABLE DEVICE | Site: BILE DUCT | Status: FUNCTIONAL
Brand: ADVANIX™ BILIARY

## 2023-01-26 RX ORDER — LIDOCAINE HYDROCHLORIDE 10 MG/ML
INJECTION, SOLUTION INFILTRATION; PERINEURAL PRN
Status: DISCONTINUED | OUTPATIENT
Start: 2023-01-26 | End: 2023-01-26 | Stop reason: SDUPTHER

## 2023-01-26 RX ORDER — SODIUM CHLORIDE 0.9 % (FLUSH) 0.9 %
5-40 SYRINGE (ML) INJECTION EVERY 12 HOURS SCHEDULED
Status: DISCONTINUED | OUTPATIENT
Start: 2023-01-26 | End: 2023-01-29

## 2023-01-26 RX ORDER — SUCCINYLCHOLINE CHLORIDE 20 MG/ML
INJECTION INTRAMUSCULAR; INTRAVENOUS PRN
Status: DISCONTINUED | OUTPATIENT
Start: 2023-01-26 | End: 2023-01-26 | Stop reason: SDUPTHER

## 2023-01-26 RX ORDER — SODIUM CHLORIDE 9 MG/ML
INJECTION, SOLUTION INTRAVENOUS PRN
Status: DISCONTINUED | OUTPATIENT
Start: 2023-01-26 | End: 2023-01-29

## 2023-01-26 RX ORDER — ONDANSETRON 2 MG/ML
INJECTION INTRAMUSCULAR; INTRAVENOUS PRN
Status: DISCONTINUED | OUTPATIENT
Start: 2023-01-26 | End: 2023-01-26 | Stop reason: SDUPTHER

## 2023-01-26 RX ORDER — MIDAZOLAM HYDROCHLORIDE 1 MG/ML
INJECTION INTRAMUSCULAR; INTRAVENOUS PRN
Status: DISCONTINUED | OUTPATIENT
Start: 2023-01-26 | End: 2023-01-26 | Stop reason: SDUPTHER

## 2023-01-26 RX ORDER — FENTANYL CITRATE 50 UG/ML
25 INJECTION, SOLUTION INTRAMUSCULAR; INTRAVENOUS EVERY 5 MIN PRN
Status: DISCONTINUED | OUTPATIENT
Start: 2023-01-26 | End: 2023-01-27

## 2023-01-26 RX ORDER — FENTANYL CITRATE 50 UG/ML
50 INJECTION, SOLUTION INTRAMUSCULAR; INTRAVENOUS EVERY 5 MIN PRN
Status: DISCONTINUED | OUTPATIENT
Start: 2023-01-26 | End: 2023-01-27

## 2023-01-26 RX ORDER — SODIUM CHLORIDE, SODIUM LACTATE, POTASSIUM CHLORIDE, CALCIUM CHLORIDE 600; 310; 30; 20 MG/100ML; MG/100ML; MG/100ML; MG/100ML
INJECTION, SOLUTION INTRAVENOUS CONTINUOUS PRN
Status: DISCONTINUED | OUTPATIENT
Start: 2023-01-26 | End: 2023-01-26 | Stop reason: SDUPTHER

## 2023-01-26 RX ORDER — DEXAMETHASONE SODIUM PHOSPHATE 10 MG/ML
INJECTION, SOLUTION INTRAMUSCULAR; INTRAVENOUS PRN
Status: DISCONTINUED | OUTPATIENT
Start: 2023-01-26 | End: 2023-01-26 | Stop reason: SDUPTHER

## 2023-01-26 RX ORDER — SODIUM CHLORIDE 0.9 % (FLUSH) 0.9 %
5-40 SYRINGE (ML) INJECTION PRN
Status: DISCONTINUED | OUTPATIENT
Start: 2023-01-26 | End: 2023-01-29

## 2023-01-26 RX ORDER — HYDROMORPHONE HYDROCHLORIDE 1 MG/ML
2 INJECTION, SOLUTION INTRAMUSCULAR; INTRAVENOUS; SUBCUTANEOUS
Status: DISCONTINUED | OUTPATIENT
Start: 2023-01-26 | End: 2023-01-30

## 2023-01-26 RX ORDER — DIPHENHYDRAMINE HYDROCHLORIDE 50 MG/ML
12.5 INJECTION INTRAMUSCULAR; INTRAVENOUS
Status: ACTIVE | OUTPATIENT
Start: 2023-01-26 | End: 2023-01-27

## 2023-01-26 RX ORDER — ENOXAPARIN SODIUM 100 MG/ML
30 INJECTION SUBCUTANEOUS 2 TIMES DAILY
Status: DISCONTINUED | OUTPATIENT
Start: 2023-01-26 | End: 2023-01-30 | Stop reason: HOSPADM

## 2023-01-26 RX ORDER — FENTANYL CITRATE 50 UG/ML
INJECTION, SOLUTION INTRAMUSCULAR; INTRAVENOUS PRN
Status: DISCONTINUED | OUTPATIENT
Start: 2023-01-26 | End: 2023-01-26 | Stop reason: SDUPTHER

## 2023-01-26 RX ORDER — ONDANSETRON 2 MG/ML
4 INJECTION INTRAMUSCULAR; INTRAVENOUS
Status: ACTIVE | OUTPATIENT
Start: 2023-01-26 | End: 2023-01-27

## 2023-01-26 RX ORDER — PROPOFOL 10 MG/ML
INJECTION, EMULSION INTRAVENOUS PRN
Status: DISCONTINUED | OUTPATIENT
Start: 2023-01-26 | End: 2023-01-26 | Stop reason: SDUPTHER

## 2023-01-26 RX ORDER — HYDROMORPHONE HYDROCHLORIDE 1 MG/ML
2 INJECTION, SOLUTION INTRAMUSCULAR; INTRAVENOUS; SUBCUTANEOUS ONCE
Status: COMPLETED | OUTPATIENT
Start: 2023-01-26 | End: 2023-01-26

## 2023-01-26 RX ORDER — HYDROMORPHONE HYDROCHLORIDE 1 MG/ML
1 INJECTION, SOLUTION INTRAMUSCULAR; INTRAVENOUS; SUBCUTANEOUS
Status: DISCONTINUED | OUTPATIENT
Start: 2023-01-26 | End: 2023-01-30

## 2023-01-26 RX ORDER — PROCHLORPERAZINE EDISYLATE 5 MG/ML
5 INJECTION INTRAMUSCULAR; INTRAVENOUS
Status: ACTIVE | OUTPATIENT
Start: 2023-01-26 | End: 2023-01-27

## 2023-01-26 RX ORDER — ROCURONIUM BROMIDE 10 MG/ML
INJECTION, SOLUTION INTRAVENOUS PRN
Status: DISCONTINUED | OUTPATIENT
Start: 2023-01-26 | End: 2023-01-26 | Stop reason: SDUPTHER

## 2023-01-26 RX ADMIN — FENTANYL CITRATE 100 MCG: 50 INJECTION INTRAMUSCULAR; INTRAVENOUS at 15:14

## 2023-01-26 RX ADMIN — SUGAMMADEX 557 MG: 100 INJECTION, SOLUTION INTRAVENOUS at 15:43

## 2023-01-26 RX ADMIN — PANTOPRAZOLE SODIUM 40 MG: 40 TABLET, DELAYED RELEASE ORAL at 16:39

## 2023-01-26 RX ADMIN — KETOROLAC TROMETHAMINE 30 MG: 30 INJECTION, SOLUTION INTRAMUSCULAR; INTRAVENOUS at 04:02

## 2023-01-26 RX ADMIN — PROPOFOL 200 MG: 10 INJECTION, EMULSION INTRAVENOUS at 15:16

## 2023-01-26 RX ADMIN — LIDOCAINE HYDROCHLORIDE 40 MG: 10 INJECTION, SOLUTION INFILTRATION; PERINEURAL at 15:16

## 2023-01-26 RX ADMIN — HYDROMORPHONE HYDROCHLORIDE 2 MG: 1 INJECTION, SOLUTION INTRAMUSCULAR; INTRAVENOUS; SUBCUTANEOUS at 08:01

## 2023-01-26 RX ADMIN — KETOROLAC TROMETHAMINE 30 MG: 30 INJECTION, SOLUTION INTRAMUSCULAR; INTRAVENOUS at 20:00

## 2023-01-26 RX ADMIN — DEXAMETHASONE SODIUM PHOSPHATE 10 MG: 10 INJECTION, SOLUTION INTRAMUSCULAR; INTRAVENOUS at 15:24

## 2023-01-26 RX ADMIN — HYDROMORPHONE HYDROCHLORIDE 2 MG: 1 INJECTION, SOLUTION INTRAMUSCULAR; INTRAVENOUS; SUBCUTANEOUS at 01:38

## 2023-01-26 RX ADMIN — SUCCINYLCHOLINE CHLORIDE 120 MG: 20 INJECTION, SOLUTION INTRAMUSCULAR; INTRAVENOUS at 15:18

## 2023-01-26 RX ADMIN — ACETAMINOPHEN 1000 MG: 500 TABLET, FILM COATED ORAL at 06:34

## 2023-01-26 RX ADMIN — MIDAZOLAM 2 MG: 1 INJECTION INTRAMUSCULAR; INTRAVENOUS at 15:09

## 2023-01-26 RX ADMIN — ENOXAPARIN SODIUM 30 MG: 100 INJECTION SUBCUTANEOUS at 20:00

## 2023-01-26 RX ADMIN — ROCURONIUM BROMIDE 40 MG: 10 INJECTION INTRAVENOUS at 15:26

## 2023-01-26 RX ADMIN — PIPERACILLIN AND TAZOBACTAM 3375 MG: 3; .375 INJECTION, POWDER, FOR SOLUTION INTRAVENOUS at 01:44

## 2023-01-26 RX ADMIN — SODIUM CHLORIDE, SODIUM LACTATE, POTASSIUM CHLORIDE, AND CALCIUM CHLORIDE: 600; 310; 30; 20 INJECTION, SOLUTION INTRAVENOUS at 15:07

## 2023-01-26 RX ADMIN — BUSPIRONE HYDROCHLORIDE 15 MG: 15 TABLET ORAL at 10:18

## 2023-01-26 RX ADMIN — BUSPIRONE HYDROCHLORIDE 15 MG: 15 TABLET ORAL at 20:00

## 2023-01-26 RX ADMIN — PIPERACILLIN AND TAZOBACTAM 3375 MG: 3; .375 INJECTION, POWDER, FOR SOLUTION INTRAVENOUS at 17:14

## 2023-01-26 RX ADMIN — ONDANSETRON 4 MG: 2 INJECTION INTRAMUSCULAR; INTRAVENOUS at 15:10

## 2023-01-26 RX ADMIN — PIPERACILLIN AND TAZOBACTAM 3375 MG: 3; .375 INJECTION, POWDER, FOR SOLUTION INTRAVENOUS at 10:17

## 2023-01-26 RX ADMIN — LAMOTRIGINE 150 MG: 100 TABLET ORAL at 10:17

## 2023-01-26 RX ADMIN — METHOCARBAMOL 500 MG: 500 TABLET ORAL at 10:17

## 2023-01-26 RX ADMIN — HYDROMORPHONE HYDROCHLORIDE 2 MG: 1 INJECTION, SOLUTION INTRAMUSCULAR; INTRAVENOUS; SUBCUTANEOUS at 12:14

## 2023-01-26 RX ADMIN — HYDROMORPHONE HYDROCHLORIDE 2 MG: 1 INJECTION, SOLUTION INTRAMUSCULAR; INTRAVENOUS; SUBCUTANEOUS at 19:59

## 2023-01-26 RX ADMIN — SODIUM CHLORIDE, PRESERVATIVE FREE 10 ML: 5 INJECTION INTRAVENOUS at 19:59

## 2023-01-26 RX ADMIN — METHOCARBAMOL 500 MG: 500 TABLET ORAL at 20:00

## 2023-01-26 RX ADMIN — PANTOPRAZOLE SODIUM 40 MG: 40 TABLET, DELAYED RELEASE ORAL at 06:34

## 2023-01-26 RX ADMIN — ROCURONIUM BROMIDE 5 MG: 10 INJECTION INTRAVENOUS at 15:17

## 2023-01-26 RX ADMIN — KETOROLAC TROMETHAMINE 30 MG: 30 INJECTION, SOLUTION INTRAMUSCULAR; INTRAVENOUS at 10:17

## 2023-01-26 RX ADMIN — PROMETHAZINE HYDROCHLORIDE 12.5 MG: 12.5 TABLET ORAL at 00:38

## 2023-01-26 RX ADMIN — HYDROMORPHONE HYDROCHLORIDE 2 MG: 1 INJECTION, SOLUTION INTRAMUSCULAR; INTRAVENOUS; SUBCUTANEOUS at 16:39

## 2023-01-26 ASSESSMENT — PAIN SCALES - GENERAL
PAINLEVEL_OUTOF10: 8
PAINLEVEL_OUTOF10: 7
PAINLEVEL_OUTOF10: 10
PAINLEVEL_OUTOF10: 2
PAINLEVEL_OUTOF10: 1
PAINLEVEL_OUTOF10: 8
PAINLEVEL_OUTOF10: 8
PAINLEVEL_OUTOF10: 7
PAINLEVEL_OUTOF10: 10

## 2023-01-26 ASSESSMENT — PAIN DESCRIPTION - ONSET
ONSET: ON-GOING
ONSET: ON-GOING

## 2023-01-26 ASSESSMENT — PAIN DESCRIPTION - DESCRIPTORS
DESCRIPTORS: PRESSURE
DESCRIPTORS: STABBING
DESCRIPTORS: PRESSURE
DESCRIPTORS: ACHING
DESCRIPTORS: ACHING;PRESSURE
DESCRIPTORS: ACHING;CRAMPING;DISCOMFORT

## 2023-01-26 ASSESSMENT — PAIN DESCRIPTION - FREQUENCY
FREQUENCY: CONTINUOUS
FREQUENCY: CONTINUOUS

## 2023-01-26 ASSESSMENT — PAIN DESCRIPTION - ORIENTATION
ORIENTATION: RIGHT;UPPER;LOWER
ORIENTATION: RIGHT
ORIENTATION: LOWER;RIGHT

## 2023-01-26 ASSESSMENT — PAIN DESCRIPTION - LOCATION
LOCATION: ABDOMEN
LOCATION: ABDOMEN;CHEST

## 2023-01-26 ASSESSMENT — PAIN - FUNCTIONAL ASSESSMENT
PAIN_FUNCTIONAL_ASSESSMENT: PREVENTS OR INTERFERES SOME ACTIVE ACTIVITIES AND ADLS
PAIN_FUNCTIONAL_ASSESSMENT: ACTIVITIES ARE NOT PREVENTED
PAIN_FUNCTIONAL_ASSESSMENT: PREVENTS OR INTERFERES SOME ACTIVE ACTIVITIES AND ADLS

## 2023-01-26 ASSESSMENT — LIFESTYLE VARIABLES: SMOKING_STATUS: 0

## 2023-01-26 ASSESSMENT — ENCOUNTER SYMPTOMS: SHORTNESS OF BREATH: 0

## 2023-01-26 ASSESSMENT — PAIN DESCRIPTION - PAIN TYPE
TYPE: SURGICAL PAIN
TYPE: SURGICAL PAIN

## 2023-01-26 NOTE — OP NOTE
Endoscopic Procedure Note    Patient: Vicenta Zuluaga : 1990  Med Rec#: [id number] Acc#: [id number]     Primary Care Provider YINKA Cottrell  Referring Provider: Nan Joshi DO    Endoscopist: Roopa Islas MD    Date of Procedure:  2023     Procedure:   1. ERCP with stent placement   2. Intra procedure interpretation of biliary cholangiogram [id number]    Indications:   1. Post bile leak  2. Abdominal pain    Anesthesia:  General     Estimated Blood Loss: minimal    Procedure:   Prior to the procedure the patient's chart was reviewed and informed consent was obtained. Risk and Benefits (Risks including but not limited to bleeding, perforation, infection, 8 to 10% risk of post ERCP pancreatitis, and even death) were discussed with the patient. They were agreeable to continue. Patient was brought to the operating room, underwent general anesthesia, and placed in the prone position. A side-viewing duodenoscope was advanced from the oropharynx down to the distal duodenum and reduced into a short position opposite the ampulla. The ampulla appeared normal. A  film was obtained which was normal.     The bile duct was then cannulated using a 0.035 x 260 cm straight Dreamwire. A short nose traction sphincterotome was advanced over the wire and the bile duct was deeply cannulated. Contrast was injected. I personally interpreted the bile duct images. The flow of contrast was adequate. Contrast injection showed a normal caliber bile duct. The pancreatic duct was not injected. To help discover objects an approximate 1 cm biliary sphincterotomy was made using a monofilament autotome and electrocautery. There was minimal post sphincterotomy bleeding. The bile duct was swept multiple times starting the bifurcation with a 12mm biliary extraction balloon. No obvious stones/sludge was removed. Occlusion cholangiogram showed no filling defects.  A delayed image showed contrast extravasation in the area of the cystic duct. Stent placement - to help create a path of least resistance, 10F x 7cm plastic stent was placed across the ampulla and proximal to the cystic duct take off. Bile was draining through the stent at the end of the procedure. At the end of the procedure the biliary tree was draining well. IMPRESSION:  1. Post op bile leak s/p sphincterotomy and stent placement       RECOMMENDATIONS:    1. Clear liquid diet today with advancing diet tomorrow as tolerated. 2.  Repeat ERCP in 3 months for stent removal and repeat cholangiogram  3. Timing of AGNIESZKA drain removal per Surgery Service and Dr Gillis Apgar. 4.  Please do not hesitate to call with any questions. The results were discussed with the patient and family. A copy of the images obtained were given to the patient.      Tigist Terry MD  2023  3:44 PM

## 2023-01-26 NOTE — CONSULTS
Pt Name: Lady Meza  MRN: [id number]  [id number]  YOB: 1990  Admit Date: 2023  2:57 PM  Date of evaluation: 2023  Primary Care Physician: YINKA Donald   [id number]       Requesting Provider:  Kriss Jamison DO    GI Consult    Indication:  bile leak s/p recent cholecystectomy    History:  The patient is a 29 y.o. female admitted yesterday for abdominal pain. Patient has relevant history of elective robotic cholecystectomy on 23 for stones. Unfortunately she developed abdominal pain, n/a and continued symptoms. She was evaluated in the ER 2-3 times over the weekend. She has increased bile output in the her AGNIESZKA drain. Her bilirubin has remained normal.     Recent HIDA yesterday was normal. However, due to continued bile output, there is strong clinical concern for bile leak. Pain:   Location:  epigastrium/RUQ  Duration:  episodic  Radiation:  back  Associated:  nausea/vomiting  Mitigating factors:  pain meds  Exacerbating factors:  palpation, movement. Past Medical History:        Diagnosis Date    Anxiety     GERD (gastroesophageal reflux disease)     Headache     History of alcohol abuse     sober since 2022     Past Surgical History:        Procedure Laterality Date     SECTION      CHOLECYSTECTOMY      CHOLECYSTECTOMY, LAPAROSCOPIC N/A 2023    ROBOTIC ASSISTED LAPAROSCOPIC CHOLECYSTECTOMY WITH ICG performed by Cong Peoples DO at [address] [place] [address]       Allergies:  Morphine  Home Meds:  Prior to Admission medications    Medication Sig Start Date End Date Taking? Authorizing Provider   cephALEXin (KEFLEX) 500 MG capsule Take 1 capsule by mouth 2 times daily for 7 days 23  Clare Gorman MD   oxyCODONE (ROXICODONE) 5 MG immediate release tablet Take 1 tablet by mouth every 6 hours as needed for Pain for up to 3 days. Intended supply: 3 days.  Take lowest dose possible to manage pain Max Daily Amount: 20 mg 23  Clare Gorman MD   methocarbamol (ROBAXIN) 500 MG tablet Take 1 tablet by mouth 3 times daily for 5 days 23  Arsenio Conteh DO   ibuprofen (ADVIL;MOTRIN) 800 MG tablet Take 1 tablet by mouth 3 times daily (with meals) for 5 days 23  Arsenio Conteh DO   omeprazole (PRILOSEC) 20 MG delayed release capsule Take 1 capsule by mouth every morning (before breakfast) 23   YINKA Lemus   Atogepant (QULIPTA) 10 MG TABS Take 10 mg by mouth daily 23   YINKA Day   busPIRone (BUSPAR) 15 MG tablet Take 15 mg by mouth 3 times daily 12/15/22   Historical Provider, MD   lamoTRIgine (LAMICTAL) 100 MG tablet Take 150 mg by mouth daily 12/15/22   Historical Provider, MD        Current Meds:       enoxaparin  30 mg SubCUTAneous BID    Atogepant  10 mg Oral Daily    busPIRone  15 mg Oral TID    lamoTRIgine  150 mg Oral Daily    methocarbamol  500 mg Oral TID    pantoprazole  40 mg Oral BID AC    acetaminophen  1,000 mg Oral 3 times per day    ketorolac  30 mg IntraVENous Q6H    piperacillin-tazobactam  3,375 mg IntraVENous q8h        lactated ringers IV soln 125 mL/hr at 23 2143       PRN Meds:  HYDROmorphone **OR** HYDROmorphone, potassium chloride, magnesium sulfate, promethazine **OR** ondansetron    Social History:   Social History     Socioeconomic History    Marital status: Single     Spouse name: Not on file    Number of children: Not on file    Years of education: Not on file    Highest education level: Not on file   Occupational History    Not on file   Tobacco Use    Smoking status: Former     Packs/day: 0.50     Types: Cigarettes     Quit date: 2022     Years since quittin.1    Smokeless tobacco: Never   Vaping Use    Vaping Use: Every day    Substances: Nicotine, Flavoring    Devices: Refillable tank   Substance and Sexual Activity    Alcohol use: Not Currently     Comment: sober 2022    Drug use: Not Currently    Sexual activity: Not on file   Other Topics Concern    Not on file   Social History Narrative    Not on file     Social Determinants of Health     Financial Resource Strain: Medium Risk    Difficulty of Paying Living Expenses: Somewhat hard   Food Insecurity: No Food Insecurity    Worried About Running Out of Food in the Last Year: Never true    Ran Out of Food in the Last Year: Never true   Transportation Needs: Not on file   Physical Activity: Insufficiently Active    Days of Exercise per Week: 3 days    Minutes of Exercise per Session: 30 min   Stress: Not on file   Social Connections: Not on file   Intimate Partner Violence: Not At Risk    Fear of Current or Ex-Partner: No    Emotionally Abused: No    Physically Abused: No    Sexually Abused: No   Housing Stability: Not on file       Family History:   Family History   Problem Relation Age of Onset    High Blood Pressure Mother     Heart Disease Father     High Blood Pressure Father     Cancer Paternal Grandmother     Colon Polyps Neg Hx     Colon Cancer Neg Hx        No GI malignancies     ROS:  GENERAL: No fever or chills. +fatigue, +decreased appetite. NEURO: No headache or seizure. EYES: No diplopia or vision loss. CARDIOVASCULAR: No chest pain or palpitations. RESPIRATORY: No dyspnea or cough. GI: no melena. no hematochezia, + abdominal pain, + nausea/vomiting  : No dysuria or hematuria. GYN: No discharge or abnormal bleeding. MUSCULOSKELETAL: No new arthralgias or myalgias  DERM: No rash or jaundice. ENDOCRINE: No polyuria or polydipsia. PSYCH: No anxiety or depression.     Physical Exam:  VITALS:   Vitals:    01/26/23 0801 01/26/23 1217 01/26/23 1244 01/26/23 1435   BP:  (!) 150/88  136/87   Pulse:  97  91   Resp: 22 22 20 25   Temp:  98 °F (36.7 °C)     TempSrc:  Temporal     SpO2:  98%  95%       General appearance: alert and cooperative with exam, appears stated age, no acute distress   Head: normal cephalic, atraumatic. EOMI bilaterally, no neck lymphadenopathy appreciated, no carotid bruits noted  Lungs: clear to auscultation bilaterally  Heart: regular rate and rhythm, S1, S2 normal, no murmur, click, rub or gallop  Abdomen: abnormal findings:  tenderness moderate in the epigastrium and in the RUQ  Skin: warm, dry, no obvious rash, non-jaundice  Extremities: extremities normal, atraumatic, no cyanosis or edema  Neurologic: No obvious focal neurologic deficits. CN II-XII grossly intact      Labs:     Recent Labs     01/23/23 2157 01/25/23  1511 01/26/23  0245   WBC 11.7* 20.4* 22.4*   RBC 4.37 5.07 5.13   HGB 12.3 14.3 14.7   HCT 39.0 45.6 45.6   MCV 89.2 89.9 88.9   MCH 28.1 28.2 28.7   MCHC 31.5* 31.4* 32.2*    459* 297     Recent Labs     01/23/23 2157 01/25/23  1511 01/26/23  0245    137 136   K 4.0 4.6 4.7   ANIONGAP 9 11 15    103 101   CO2 25 23 20*   BUN 12 11 14   CREATININE 0.6 0.5 0.5   GLUCOSE 87 103 91   CALCIUM 8.4* 9.4 9.2     No results for input(s): MG, PHOS in the last 72 hours. Recent Labs     01/23/23 2157 01/25/23  1511 01/26/23  0245   AST 25 18 25   ALT 37* 33 38*   BILITOT <0.2 0.6 0.6   ALKPHOS 105* 135* 134*     HgBA1c:  No components found for: HGBA1C  FLP:  No results found for: TRIG, HDL, LDLCALC, LDLDIRECT, LABVLDL  TSH:  No results found for: TSH  Troponin T: No results for input(s): TROPONINI in the last 72 hours. INR: No results for input(s): INR in the last 72 hours. Recent Labs     01/23/23 2157 01/25/23  1511   LIPASE 20 13       Radiology:  XR KNEE LEFT (3 VIEWS)    Result Date: 1/12/2023  Impression: Normal examination. Dictated and Electronically Signed by Alexus Joy MD at 12-Jan-2023 10:38:33 AM             XR KNEE RIGHT (3 VIEWS)    Result Date: 1/12/2023  Impression: Normal examination. Dictated and Electronically Signed by Alexus Joy MD at 12-Jan-2023 11:20:46 AM             NM HEPATOBILIARY    Result Date: 1/26/2023  IMPRESSION 1. No definite evidence of evidence of biliary leak.The gallbladder has been surgically removed. 2.Rounded defect in the right hepatic lobe is nonspecific.    CT ABDOMEN PELVIS W IV CONTRAST Additional Contrast? None    Result Date: 1/26/2023  1.Postsurgical changes from cholecystectomy.New small volume simple appearing free fluid in the pelvis may be reactive.No fluid collection is seen within the gallbladder fossa.    CT ABDOMEN PELVIS W IV CONTRAST Additional Contrast? None    Result Date: 1/23/2023  No acute findings in the abdomen or pelvis.Communications:01/24/23 00:16 Verify Receipt Verified receipt with  Dr. Guevara on 01/24 00:16 (-06:00)Electronically signed by Chu Ko MD on 01-23-23 at 2340    CT ABDOMEN PELVIS W IV CONTRAST Additional Contrast? None    Result Date: 1/20/2023  1.  Expected postoperative changes status post laparoscopic cholecystectomy.2.  Percutaneous drain terminating lateral to the right hepatic lobe.3.  Trace pleural effusions.  Bibasilar atelectasis.Electronically signed by Xuan Tapia M.D.  on 01-20-23 at 2327    XR CHEST PORTABLE    Result Date: 1/25/2023  Low lung volumes, with elevation of the left hemidiaphragm.  Left mid lung airspace opacity, atelectasis versus pneumonia.  Recommendation: Follow up recommended Dictated and Electronically Signed by CATA HALLMAN at 25-Jan-2023 06:44:06 PM EST             XR CHEST PORTABLE    Result Date: 1/23/2023  Hypoventilatory lungs.  Mild left basilar atelectasis or scar.Electronically signed by Summer Gold MD on 01-24-23 at 0038       Assessment:  Post op 5 days from cholecystectomy  Increased bile output from AGNIESZKA drain  Abdominal pain with significant peritoneal signs     Plan:  - NPO   - ERCP today with sphincterotomy and likely biliary stent placement     I have discussed the benefits, alternatives, and risks (including bleeding, perforation and death)  for pursuing Endoscopy (EGD/Colonscopy/EUS/ERCP) with the patient and  they are willing to continue. We also discussed the need for anesthesia, IV access, proper dietary changes, medication changes if necessary, and need for bowel prep (if ordered) prior to their Endoscopic procedure. They are aware they must have someone accompany them to their scheduled procedure to drive them home - they agree to the above and are willing to continue. - Further recommendations pending ERCP results and clinical course.

## 2023-01-26 NOTE — ANESTHESIA PRE PROCEDURE
Department of Anesthesiology  Preprocedure Note       Name:  Mitesh Monk   Age:  29 y.o.  :  1990                                          MRN:  [id number]         Date:  2023      Surgeon: Latia Bro):  Elaina Cavanaugh MD    Procedure: Procedure(s):  ERCP DIAGNOSTIC    Medications prior to admission:   Prior to Admission medications    Medication Sig Start Date End Date Taking? Authorizing Provider   cephALEXin (KEFLEX) 500 MG capsule Take 1 capsule by mouth 2 times daily for 7 days 23  Ronnie Amin MD   oxyCODONE (ROXICODONE) 5 MG immediate release tablet Take 1 tablet by mouth every 6 hours as needed for Pain for up to 3 days. Intended supply: 3 days.  Take lowest dose possible to manage pain Max Daily Amount: 20 mg 23  Ronnie Amin MD   methocarbamol (ROBAXIN) 500 MG tablet Take 1 tablet by mouth 3 times daily for 5 days 23  Loni Liriano DO   ibuprofen (ADVIL;MOTRIN) 800 MG tablet Take 1 tablet by mouth 3 times daily (with meals) for 5 days 23  Loni Liriano DO   omeprazole (PRILOSEC) 20 MG delayed release capsule Take 1 capsule by mouth every morning (before breakfast) 23   YINKA Prajapati   Atogepant (QULIPTA) 10 MG TABS Take 10 mg by mouth daily 23   YINKA Day   busPIRone (BUSPAR) 15 MG tablet Take 15 mg by mouth 3 times daily 12/15/22   Historical Provider, MD   lamoTRIgine (LAMICTAL) 100 MG tablet Take 150 mg by mouth daily 12/15/22   Historical Provider, MD       Current medications:    Current Facility-Administered Medications   Medication Dose Route Frequency Provider Last Rate Last Admin    HYDROmorphone HCl PF (DILAUDID) injection 1 mg  1 mg IntraVENous M9O PRN Genevieve Molina DO        Or    HYDROmorphone HCl PF (DILAUDID) injection 2 mg  2 mg IntraVENous Y5D PRN Genevieve Molina DO   2 mg at 23 1214    enoxaparin Sodium (LOVENOX) injection 30 mg  30 mg SubCUTAneous BID Genevieve PERSON Tracy, DO        Atogepant TABS 10 mg (Patient Supplied)  10 mg Oral Daily Genevieve Molina DO        busPIRone (BUSPAR) tablet 15 mg  15 mg Oral TID Genevieve Molina DO   15 mg at 01/26/23 1018    lamoTRIgine (LAMICTAL) tablet 150 mg  754 mg Oral Daily Genevieve Molina, DO   734 mg at 01/26/23 1017    methocarbamol (ROBAXIN) tablet 500 mg  046 mg Oral TID Genevieve Molina DO   870 mg at 01/26/23 1017    pantoprazole (PROTONIX) tablet 40 mg  40 mg Oral BID AC Genevieve Molina, DO   40 mg at 01/26/23 1360    lactated ringers IV soln infusion   IntraVENous Continuous Genevieve Molina,  mL/hr at 01/25/23 2143 New Bag at 01/25/23 2143    potassium chloride 10 mEq/100 mL IVPB (Peripheral Line)  10 mEq IntraVENous PRN Genevieve Molina DO        magnesium sulfate 1000 mg in dextrose 5% 100 mL IVPB  1,000 mg IntraVENous PRN Genevieve Molina, DO        acetaminophen (TYLENOL) tablet 1,000 mg  1,000 mg Oral 3 times per day Genevieve Molina DO   6,415 mg at 01/26/23 0634    ketorolac (TORADOL) injection 30 mg  30 mg IntraVENous M8Y Genevieve Molina, DO   30 mg at 01/26/23 1017    promethazine (PHENERGAN) tablet 12.5 mg  12.5 mg Oral G8N PRN Genevieve Molina, DO   48.9 mg at 01/26/23 0038    Or    ondansetron (ZOFRAN) injection 4 mg  4 mg IntraVENous H7T PRN Genevieve Molina, DO        piperacillin-tazobactam (ZOSYN) 3,375 mg in sodium chloride 0.9 % 50 mL IVPB ([id number])  3,375 mg IntraVENous X4T Genevieve Molina, DO 46.5 mL/hr at 01/26/23 1017 3,375 mg at 01/26/23 1017       Allergies:     Allergies   Allergen Reactions    Morphine Other (See Comments)     Pt states it makes her feel like her body is on fire        Problem List:    Patient Active Problem List   Diagnosis Code    Calculus of gallbladder with chronic cholecystitis without obstruction K80.10    Post-operative pain G89.18    Post-op pain G89.18    Bile leak K83.9    Class 3 severe obesity due to excess calories in adult [facility]) E66.01       Past Medical History:        Diagnosis Date    Anxiety     GERD (gastroesophageal reflux disease)     Headache     History of alcohol abuse     sober since 2022       Past Surgical History:        Procedure Laterality Date     SECTION      CHOLECYSTECTOMY      CHOLECYSTECTOMY, LAPAROSCOPIC N/A 2023    ROBOTIC ASSISTED LAPAROSCOPIC CHOLECYSTECTOMY WITH ICG performed by Rhett Luz DO at [facility] MYRINGOTOMY W/ TUBES      TUBAL LIGATION         Social History:    Social History     Tobacco Use    Smoking status: Former     Packs/day: 0.50     Types: Cigarettes     Quit date: 2022     Years since quittin.1    Smokeless tobacco: Never   Substance Use Topics    Alcohol use: Not Currently     Comment: sober 2022                                Counseling given: Not Answered      Vital Signs (Current):   Vitals:    23 0801 23 1217 23 1244 23 1435   BP:  (!) 150/88  136/87   Pulse:  97  91   Resp:    Temp:  98 °F (36.7 °C)     TempSrc:  Temporal     SpO2:  98%  95%                                              BP Readings from Last 3 Encounters:   23 136/87   23 119/76   23 (!) 142/86       NPO Status:                                                                                 BMI:   Wt Readings from Last 3 Encounters:   23 (!) 307 lb (139.3 kg)   23 (!) 307 lb (139.3 kg)   23 (!) 307 lb (139.3 kg)     There is no height or weight on file to calculate BMI.    CBC:   Lab Results   Component Value Date/Time    WBC 22.4 2023 02:45 AM    RBC 5.13 2023 02:45 AM    HGB 14.7 2023 02:45 AM    HCT 45.6 2023 02:45 AM    MCV 88.9 2023 02:45 AM    RDW 14.6 2023 02:45 AM     2023 02:45 AM       CMP:   Lab Results   Component Value Date/Time     2023 02:45 AM    K 4.7 2023 02:45 AM     2023 02:45 AM    CO2 20 2023 02:45 AM    BUN 14 2023 02:45 AM    CREATININE 0.5 01/26/2023 02:45 AM    LABGLOM >60 01/26/2023 02:45 AM    GLUCOSE 91 01/26/2023 02:45 AM    PROT 6.2 01/26/2023 02:45 AM    CALCIUM 9.2 01/26/2023 02:45 AM    BILITOT 0.6 01/26/2023 02:45 AM    ALKPHOS 134 01/26/2023 02:45 AM    AST 25 01/26/2023 02:45 AM    ALT 38 01/26/2023 02:45 AM       POC Tests: No results for input(s): POCGLU, POCNA, POCK, POCCL, POCBUN, POCHEMO, POCHCT in the last 72 hours. Coags: No results found for: PROTIME, INR, APTT    HCG (If Applicable):   Lab Results   Component Value Date    PREGTESTUR Negative 01/20/2023        ABGs: No results found for: PHART, PO2ART, ELH7YAC, JFT8OJL, BEART, E0IHWCSP     Type & Screen (If Applicable):  No results found for: LABABO, LABRH    Drug/Infectious Status (If Applicable):  No results found for: HIV, HEPCAB    COVID-19 Screening (If Applicable):   Lab Results   Component Value Date/Time    COVID19 Not Detected 01/25/2023 05:24 PM           Anesthesia Evaluation  Patient summary reviewed and Nursing notes reviewed no history of anesthetic complications:   Airway: Mallampati: II  TM distance: >3 FB   Neck ROM: full  Mouth opening: > = 3 FB   Dental: normal exam         Pulmonary:Negative Pulmonary ROS and normal exam  breath sounds clear to auscultation      (-) shortness of breath and not a current smoker          Patient did not smoke on day of surgery. Cardiovascular:        (-) hypertension, CAD,  angina and  CHF    NYHA Classification: I  ECG reviewed  Rhythm: regular  Rate: normal           Beta Blocker:  Not on Beta Blocker         Neuro/Psych:   Negative Neuro/Psych ROS  (+) headaches:,    (-) seizures, CVA and depression/anxiety            GI/Hepatic/Renal: Neg GI/Hepatic/Renal ROS  (+) GERD:,      (-) hiatal hernia       Endo/Other: Negative Endo/Other ROS             Pt had PAT visit. Abdominal:       Abdomen: soft. Vascular:           Other Findings:             Anesthesia Plan      general ASA 2     (Negative HCG yesterday.)  Induction: intravenous. BIS  MIPS: Postoperative opioids intended and Prophylactic antiemetics administered. Anesthetic plan and risks discussed with patient. Use of blood products discussed with patient whom. Plan discussed with CRNA.     Attending anesthesiologist reviewed and agrees with Pre Eval content                Greg Fowler DO   1/26/2023

## 2023-01-26 NOTE — PROGRESS NOTES
AGNIESZKA bulb noted to not be holding pressure, called clinical house to obtain new bulb as we do not keep these stocked on the floor.

## 2023-01-26 NOTE — PROGRESS NOTES
Automatic Dose Adjustment of                Subcutaneous Anticoagulant for Prophylaxis    Aly Yap is a 29 y.o. female. Recent Labs     01/25/23  1511 01/26/23  0245   CREATININE 0.5 0.5       Estimated Creatinine Clearance: 244 mL/min (based on SCr of 0.5 mg/dL). Weight:  Wt Readings from Last 1 Encounters:   01/23/23 (!) 307 lb (139.3 kg)           Pharmacy has adjusted subcutaneous anticoagulant for prophylaxis to Enoxaparin 30 mg SC twice daily based on the patient's weight and estimated CrCl per [address] policy.                Electronically signed by Karen Roman, [address] [place] [address] on 1/26/2023 at 2:15 PM

## 2023-01-26 NOTE — PROGRESS NOTES
[facility] General Surgery Progress Note    Chief Complaint:  Chief Complaint   Patient presents with    Post-op Problem     Pt had cholecystectomy on Fri, pt states Dr. Molina advised evaluation. Pt states she began throwing up bile last night, and is having issues with her drain not closing.   ABCs are intact. Skin wpd. A&ox4.        POD # 6    S: Mrs. Hardwick is seen and examiend at bedside. She notes her pain is somewhat improved with a new bulb for her drain and increased pain medications overnight. Awaiting ERCP.     O:   BP (!) 150/88   Pulse 97   Temp 98 °F (36.7 °C) (Temporal)   Resp 20   LMP 01/09/2023   SpO2 98%   I/O reviewed and appropriate  CONSTITUTIONAL: Alert, appropriate, no acute distress  SKIN: warm, dry with no rashes or lesions  HEENT: NCAT, Non icteric, PERR. Trachea Midline.  CHEST/LUNGS: CTA bilaterally. Normal respiratory effort.  CARDIOVASCULAR: RRR, No edema.  ABDOMEN: soft, ND, appropriately TTP, +BS. Bilious drain output.  Incisions: Clean, dry, and intact with no erythema or induration  NEUROLOGIC: CN II-XI grossly intact, no motor or sensory deficits   MUSCULOSKELETAL: No clubbing or cyanosis.   PSYCHIATRIC:  Normal Mood and Affect. Alert and Oriented.    LABS:   CBC:   Recent Labs     01/23/23 2157 01/25/23  1511 01/26/23  0245   WBC 11.7* 20.4* 22.4*   RBC 4.37 5.07 5.13   HGB 12.3 14.3 14.7   HCT 39.0 45.6 45.6    459* 297   LYMPHOPCT 29.5 14.0* 10.0*   MONOPCT 8.6 6.1 7.2   BASOPCT 0.5 0.3 0.3   MONOSABS 1.00* 1.20* 1.60*   LYMPHSABS 3.5 2.9 2.2   EOSABS 0.40 0.10 0.10   BASOSABS 0.10 0.10 0.10      BMP:   Recent Labs     01/23/23 2157 01/25/23  1511 01/26/23  0245    137 136   K 4.0 4.6 4.7    103 101   CO2 25 23 20*   ANIONGAP 9 11 15   GLUCOSE 87 103 91   CREATININE 0.6 0.5 0.5   LABGLOM >60 >60 >60   CALCIUM 8.4* 9.4 9.2     LFT:   Recent Labs     01/23/23  2157 01/25/23  1511 01/26/23  0245   PROT 6.1* 7.3 6.2*   LABALBU 3.3* 3.8 3.7   BILITOT <0.2  0.6 0.6   ALKPHOS 105* 135* 134*   ALT 37* 33 38*   AST 25 18 25       A: Active Problems:    Bile leak    Class 3 severe obesity due to excess calories in adult [facility])  Resolved Problems:    * No resolved hospital problems. *      P:   Analgesia prn. ERCP today  May have clear liquid diet following ERCP if okay with GI. Continue zosyn. Continue AGNIESZKA drain. Encourage IS usage and ambulation.        Jd Campo DO   Electronically Signed on 1/26/2023 at 1:32 PM

## 2023-01-26 NOTE — TELEPHONE ENCOUNTER
Bucky uL,    Per Dr Stephanie Maire today:    IMPRESSION:  1. Post op bile leak s/p sphincterotomy and stent placement        RECOMMENDATIONS:    1. Clear liquid diet today with advancing diet tomorrow as tolerated. 2.  Repeat ERCP in 3 months for stent removal and repeat cholangiogram  3. Timing of AGNIESZKA drain removal per Surgery Service and Dr Carlie Mo. 4.  Please do not hesitate to call with any questions. The results were discussed with the patient and family. A copy of the images obtained were given to the patient.       Terese Solo MD  1/26/2023  3:44 PM

## 2023-01-26 NOTE — PROGRESS NOTES
Dressing to AGNIESZKA drain is saturated with yellow drainage. Reinforced dressing. Within five minutes, dressing saturated again. Again called for new AGNIESZKA bulb. Patient given pain medicine. Will update Dr. Catalina Pemberton.

## 2023-01-26 NOTE — PROGRESS NOTES
Dr. Mitch Ochoa updated. New AGNIESZKA bulb must be obtained to contain drainage. Aware of difficulty in controlling patient's pain.

## 2023-01-26 NOTE — ANESTHESIA POSTPROCEDURE EVALUATION
Department of Anesthesiology  Postprocedure Note    Patient: Stefania Talavera  MRN: [id number]  YOB: 1990  Date of evaluation: 1/26/2023      Procedure Summary     Date: 01/26/23 Room / Location: [address]    Anesthesia Start: 7578 Anesthesia Stop:     Procedure: ERCP DIAGNOSTIC (Abdomen) Diagnosis:       Bile leak      (Bile leak)    Surgeons: Maximino Tidwell MD Responsible Provider: YINKA Ramirez CRNA    Anesthesia Type: general ASA Status: 2          Anesthesia Type: No value filed.     Sravani Phase I:      Sravani Phase II:        Anesthesia Post Evaluation    Patient location during evaluation: PACU  Patient participation: complete - patient participated  Level of consciousness: sleepy but conscious  Pain score: 2  Airway patency: patent  Nausea & Vomiting: no nausea and no vomiting  Complications: no  Cardiovascular status: hemodynamically stable and blood pressure returned to baseline  Respiratory status: acceptable and nasal cannula  Hydration status: stable  Comments: BP (!) 129/99   Pulse 93   Temp 98.2 °F (36.8 °C) (Tympanic)   Resp 29   LMP 01/09/2023   SpO2 100%

## 2023-01-27 LAB
ALBUMIN SERPL-MCNC: 3.3 G/DL (ref 3.5–5.2)
ALP BLD-CCNC: 137 U/L (ref 35–104)
ALT SERPL-CCNC: 40 U/L (ref 5–33)
ANION GAP SERPL CALCULATED.3IONS-SCNC: 16 MMOL/L (ref 7–19)
AST SERPL-CCNC: 24 U/L (ref 5–32)
BASOPHILS ABSOLUTE: 0.1 K/UL (ref 0–0.2)
BASOPHILS RELATIVE PERCENT: 0.2 % (ref 0–1)
BILIRUB SERPL-MCNC: 0.8 MG/DL (ref 0.2–1.2)
BUN BLDV-MCNC: 17 MG/DL (ref 6–20)
CALCIUM SERPL-MCNC: 9.2 MG/DL (ref 8.6–10)
CHLORIDE BLD-SCNC: 99 MMOL/L (ref 98–111)
CO2: 18 MMOL/L (ref 22–29)
CREAT SERPL-MCNC: 0.6 MG/DL (ref 0.5–0.9)
EOSINOPHILS ABSOLUTE: 0 K/UL (ref 0–0.6)
EOSINOPHILS RELATIVE PERCENT: 0 % (ref 0–5)
GFR SERPL CREATININE-BSD FRML MDRD: >60 ML/MIN/{1.73_M2}
GLUCOSE BLD-MCNC: 129 MG/DL (ref 74–109)
HCT VFR BLD CALC: 41.3 % (ref 37–47)
HEMOGLOBIN: 13.2 G/DL (ref 12–16)
IMMATURE GRANULOCYTES #: 0.2 K/UL
LYMPHOCYTES ABSOLUTE: 1.4 K/UL (ref 1.1–4.5)
LYMPHOCYTES RELATIVE PERCENT: 5.7 % (ref 20–40)
MCH RBC QN AUTO: 27.9 PG (ref 27–31)
MCHC RBC AUTO-ENTMCNC: 32 G/DL (ref 33–37)
MCV RBC AUTO: 87.3 FL (ref 81–99)
MONOCYTES ABSOLUTE: 1.8 K/UL (ref 0–0.9)
MONOCYTES RELATIVE PERCENT: 7 % (ref 0–10)
NEUTROPHILS ABSOLUTE: 21.5 K/UL (ref 1.5–7.5)
NEUTROPHILS RELATIVE PERCENT: 86.2 % (ref 50–65)
PDW BLD-RTO: 14.2 % (ref 11.5–14.5)
PLATELET # BLD: 371 K/UL (ref 130–400)
PMV BLD AUTO: 11.1 FL (ref 9.4–12.3)
POTASSIUM REFLEX MAGNESIUM: 4.7 MMOL/L (ref 3.5–5)
RBC # BLD: 4.73 M/UL (ref 4.2–5.4)
SODIUM BLD-SCNC: 133 MMOL/L (ref 136–145)
TOTAL PROTEIN: 5.9 G/DL (ref 6.6–8.7)
WBC # BLD: 24.9 K/UL (ref 4.8–10.8)

## 2023-01-27 PROCEDURE — 6360000002 HC RX W HCPCS: Performed by: SURGERY

## 2023-01-27 PROCEDURE — 80053 COMPREHEN METABOLIC PANEL: CPT

## 2023-01-27 PROCEDURE — 99024 POSTOP FOLLOW-UP VISIT: CPT | Performed by: SURGERY

## 2023-01-27 PROCEDURE — 85025 COMPLETE CBC W/AUTO DIFF WBC: CPT

## 2023-01-27 PROCEDURE — 2580000003 HC RX 258: Performed by: SURGERY

## 2023-01-27 PROCEDURE — 6370000000 HC RX 637 (ALT 250 FOR IP): Performed by: SURGERY

## 2023-01-27 PROCEDURE — 1210000000 HC MED SURG R&B

## 2023-01-27 PROCEDURE — 36415 COLL VENOUS BLD VENIPUNCTURE: CPT

## 2023-01-27 PROCEDURE — 2580000003 HC RX 258: Performed by: ANESTHESIOLOGY

## 2023-01-27 RX ADMIN — ACETAMINOPHEN 1000 MG: 500 TABLET, FILM COATED ORAL at 05:58

## 2023-01-27 RX ADMIN — METHOCARBAMOL 500 MG: 500 TABLET ORAL at 08:48

## 2023-01-27 RX ADMIN — HYDROMORPHONE HYDROCHLORIDE 2 MG: 1 INJECTION, SOLUTION INTRAMUSCULAR; INTRAVENOUS; SUBCUTANEOUS at 05:58

## 2023-01-27 RX ADMIN — ENOXAPARIN SODIUM 30 MG: 100 INJECTION SUBCUTANEOUS at 20:31

## 2023-01-27 RX ADMIN — ACETAMINOPHEN 1000 MG: 500 TABLET, FILM COATED ORAL at 13:48

## 2023-01-27 RX ADMIN — SODIUM CHLORIDE, PRESERVATIVE FREE 10 ML: 5 INJECTION INTRAVENOUS at 20:36

## 2023-01-27 RX ADMIN — HYDROMORPHONE HYDROCHLORIDE 2 MG: 1 INJECTION, SOLUTION INTRAMUSCULAR; INTRAVENOUS; SUBCUTANEOUS at 21:07

## 2023-01-27 RX ADMIN — PIPERACILLIN AND TAZOBACTAM 3375 MG: 3; .375 INJECTION, POWDER, FOR SOLUTION INTRAVENOUS at 18:00

## 2023-01-27 RX ADMIN — ENOXAPARIN SODIUM 30 MG: 100 INJECTION SUBCUTANEOUS at 08:47

## 2023-01-27 RX ADMIN — KETOROLAC TROMETHAMINE 30 MG: 30 INJECTION, SOLUTION INTRAMUSCULAR; INTRAVENOUS at 03:10

## 2023-01-27 RX ADMIN — PANTOPRAZOLE SODIUM 40 MG: 40 TABLET, DELAYED RELEASE ORAL at 16:26

## 2023-01-27 RX ADMIN — KETOROLAC TROMETHAMINE 30 MG: 30 INJECTION, SOLUTION INTRAMUSCULAR; INTRAVENOUS at 08:47

## 2023-01-27 RX ADMIN — BUSPIRONE HYDROCHLORIDE 15 MG: 15 TABLET ORAL at 20:30

## 2023-01-27 RX ADMIN — METHOCARBAMOL 500 MG: 500 TABLET ORAL at 20:31

## 2023-01-27 RX ADMIN — KETOROLAC TROMETHAMINE 30 MG: 30 INJECTION, SOLUTION INTRAMUSCULAR; INTRAVENOUS at 20:31

## 2023-01-27 RX ADMIN — HYDROMORPHONE HYDROCHLORIDE 2 MG: 1 INJECTION, SOLUTION INTRAMUSCULAR; INTRAVENOUS; SUBCUTANEOUS at 23:52

## 2023-01-27 RX ADMIN — ACETAMINOPHEN 1000 MG: 500 TABLET, FILM COATED ORAL at 20:30

## 2023-01-27 RX ADMIN — PANTOPRAZOLE SODIUM 40 MG: 40 TABLET, DELAYED RELEASE ORAL at 05:58

## 2023-01-27 RX ADMIN — METHOCARBAMOL 500 MG: 500 TABLET ORAL at 13:48

## 2023-01-27 RX ADMIN — SODIUM CHLORIDE, PRESERVATIVE FREE 10 ML: 5 INJECTION INTRAVENOUS at 09:00

## 2023-01-27 RX ADMIN — SODIUM CHLORIDE, POTASSIUM CHLORIDE, SODIUM LACTATE AND CALCIUM CHLORIDE: 600; 310; 30; 20 INJECTION, SOLUTION INTRAVENOUS at 12:15

## 2023-01-27 RX ADMIN — HYDROMORPHONE HYDROCHLORIDE 2 MG: 1 INJECTION, SOLUTION INTRAMUSCULAR; INTRAVENOUS; SUBCUTANEOUS at 00:48

## 2023-01-27 RX ADMIN — HYDROMORPHONE HYDROCHLORIDE 2 MG: 1 INJECTION, SOLUTION INTRAMUSCULAR; INTRAVENOUS; SUBCUTANEOUS at 18:13

## 2023-01-27 RX ADMIN — LAMOTRIGINE 150 MG: 100 TABLET ORAL at 08:48

## 2023-01-27 RX ADMIN — PIPERACILLIN AND TAZOBACTAM 3375 MG: 3; .375 INJECTION, POWDER, FOR SOLUTION INTRAVENOUS at 09:00

## 2023-01-27 RX ADMIN — HYDROMORPHONE HYDROCHLORIDE 2 MG: 1 INJECTION, SOLUTION INTRAMUSCULAR; INTRAVENOUS; SUBCUTANEOUS at 10:48

## 2023-01-27 RX ADMIN — BUSPIRONE HYDROCHLORIDE 15 MG: 15 TABLET ORAL at 13:48

## 2023-01-27 RX ADMIN — KETOROLAC TROMETHAMINE 30 MG: 30 INJECTION, SOLUTION INTRAMUSCULAR; INTRAVENOUS at 16:26

## 2023-01-27 RX ADMIN — ACETAMINOPHEN 1000 MG: 500 TABLET, FILM COATED ORAL at 00:49

## 2023-01-27 RX ADMIN — PIPERACILLIN AND TAZOBACTAM 3375 MG: 3; .375 INJECTION, POWDER, FOR SOLUTION INTRAVENOUS at 03:11

## 2023-01-27 RX ADMIN — BUSPIRONE HYDROCHLORIDE 15 MG: 15 TABLET ORAL at 08:48

## 2023-01-27 RX ADMIN — HYDROMORPHONE HYDROCHLORIDE 2 MG: 1 INJECTION, SOLUTION INTRAMUSCULAR; INTRAVENOUS; SUBCUTANEOUS at 13:48

## 2023-01-27 ASSESSMENT — PAIN SCALES - GENERAL
PAINLEVEL_OUTOF10: 7
PAINLEVEL_OUTOF10: 3
PAINLEVEL_OUTOF10: 7
PAINLEVEL_OUTOF10: 8
PAINLEVEL_OUTOF10: 3
PAINLEVEL_OUTOF10: 5
PAINLEVEL_OUTOF10: 7
PAINLEVEL_OUTOF10: 2

## 2023-01-27 ASSESSMENT — ENCOUNTER SYMPTOMS
COLOR CHANGE: 0
ABDOMINAL PAIN: 1
COUGH: 0
BACK PAIN: 0
DIARRHEA: 0
VOMITING: 0
EYE DISCHARGE: 0
RHINORRHEA: 0
SHORTNESS OF BREATH: 0

## 2023-01-27 ASSESSMENT — PAIN DESCRIPTION - DESCRIPTORS
DESCRIPTORS: ACHING;CRAMPING
DESCRIPTORS: ACHING;CRUSHING
DESCRIPTORS: ACHING;CRAMPING
DESCRIPTORS: ACHING;CRAMPING
DESCRIPTORS: ACHING

## 2023-01-27 ASSESSMENT — PAIN DESCRIPTION - LOCATION
LOCATION: ABDOMEN

## 2023-01-27 ASSESSMENT — PAIN DESCRIPTION - ORIENTATION
ORIENTATION: MID

## 2023-01-27 ASSESSMENT — PAIN - FUNCTIONAL ASSESSMENT
PAIN_FUNCTIONAL_ASSESSMENT: ACTIVITIES ARE NOT PREVENTED
PAIN_FUNCTIONAL_ASSESSMENT: PREVENTS OR INTERFERES SOME ACTIVE ACTIVITIES AND ADLS

## 2023-01-27 NOTE — ED PROVIDER NOTES
[facility]  eMERGENCY dEPARTMENT eNCOUnter      Pt Name: Dot Macario  MRN: [id number]  [place] 1990  Date of evaluation: 1/23/2023  Provider: Vahe Pagan MD    CHIEF COMPLAINT       Chief Complaint   Patient presents with    Post-op Problem     PT c/o ABD pain, states she had lap laith approx 3 days go, has AGNIESZKA drain in place, states she accidentally pulled on it today and c/o increasing pain          HISTORY OF PRESENT ILLNESS   (Location/Symptom, Timing/Onset,Context/Setting, Quality, Duration, Modifying Factors, Severity)  Note limiting factors. Dot Macario is a 29 y.o. female who presents to the emergency department with abdominal pain. Patient underwent cholecystectomy 3 days ago. Patient has a Idnesh-Fry drain in place and believes that she may have pulled on it while she was sleeping. Patient is having increasing pain in her abdomen. Patient states she has not been taking her pain medications \"because I only had 2 left and I was afraid to run out. \"  Pain is located in the right upper quadrant. Patient denies radiation. Movement exacerbates pain. Patient denies alleviating factors. Patient denies vomiting, diarrhea, black or bloody stools, fever, burning with urination, or difficulty urinating. Mother helps with history. Mother states that patient has not had a bowel movement as she had. Patient is not passing a lot of gas. Mother thinks that patient's increased pain may be secondary to \"gas pain. \"  Patient has been taking Gas-X daily but did not take 1 today. HPI    NursingNotes were reviewed. REVIEW OF SYSTEMS    (2-9 systems for level 4, 10 or more for level 5)     Review of Systems   Constitutional:  Negative for chills and fever. HENT:  Negative for congestion and rhinorrhea. Eyes:  Negative for discharge. Respiratory:  Negative for cough and shortness of breath. Cardiovascular:  Negative for chest pain and palpitations.    Gastrointestinal:  Positive for abdominal pain. Negative for diarrhea and vomiting. Genitourinary:  Negative for difficulty urinating and dysuria. Musculoskeletal:  Negative for back pain and neck pain. Skin:  Negative for color change and pallor. Neurological:  Negative for syncope and light-headedness. Psychiatric/Behavioral:  Negative for agitation and confusion. The patient is nervous/anxious. All other systems reviewed and are negative.          PAST MEDICALHISTORY     Past Medical History:   Diagnosis Date    Anxiety     GERD (gastroesophageal reflux disease)     Headache     History of alcohol abuse     sober since 2022         SURGICAL HISTORY       Past Surgical History:   Procedure Laterality Date     SECTION      CHOLECYSTECTOMY      CHOLECYSTECTOMY, LAPAROSCOPIC N/A 2023    ROBOTIC ASSISTED LAPAROSCOPIC CHOLECYSTECTOMY WITH ICG performed by Debi Jackson DO at [facility] [place] [facility] OR    ERCP  2023    Dr CHRISTINE Quinonez-w/1 cm biliary sphincterotomy, placemetn of a 10F x 7cm plastic biliary stent-Post op bile leak, repeat in 3 months    MYRINGOTOMY [address]     Discharge Medication List as of 2023 12:04 AM        CONTINUE these medications which have NOT CHANGED    Details   methocarbamol (ROBAXIN) 500 MG tablet Take 1 tablet by mouth 3 times daily for 5 days, Disp-15 tablet, R-0Normal      ibuprofen (ADVIL;MOTRIN) 800 MG tablet Take 1 tablet by mouth 3 times daily (with meals) for 5 days, Disp-15 tablet, R-0Normal      omeprazole (PRILOSEC) 20 MG delayed release capsule Take 1 capsule by mouth every morning (before breakfast), Disp-90 capsule, R-1Normal      Atogepant (QULIPTA) 10 MG TABS Take 10 mg by mouth daily, Disp-30 tablet, R-0Normal      busPIRone (BUSPAR) 15 MG tablet Take 15 mg by mouth 3 times dailyHistorical Med      lamoTRIgine (LAMICTAL) 100 MG tablet Take 150 mg by mouth dailyHistorical Med             ALLERGIES     Morphine    FAMILY HISTORY Family History   Problem Relation Age of Onset    High Blood Pressure Mother     Heart Disease Father     High Blood Pressure Father     Cancer Paternal Grandmother     Colon Polyps Neg Hx     Colon Cancer Neg Hx           SOCIAL HISTORY       Social History     Socioeconomic History    Marital status: Single     Spouse name: None    Number of children: None    Years of education: None    Highest education level: None   Tobacco Use    Smoking status: Former     Packs/day: 0.50     Types: Cigarettes     Quit date: 2022     Years since quittin.1    Smokeless tobacco: Never   Vaping Use    Vaping Use: Every day    Substances: Nicotine, Flavoring    Devices: Refillable tank   Substance and Sexual Activity    Alcohol use: Not Currently     Comment: sober 2022    Drug use: Not Currently     Social Determinants of Health     Financial Resource Strain: Medium Risk    Difficulty of Paying Living Expenses: Somewhat hard   Food Insecurity: No Food Insecurity    Worried About Running Out of Food in the Last Year: Never true    Ran Out of Food in the Last Year: Never true   Physical Activity: Insufficiently Active    Days of Exercise per Week: 3 days    Minutes of Exercise per Session: 30 min   Intimate Partner Violence: Not At Risk    Fear of Current or Ex-Partner: No    Emotionally Abused: No    Physically Abused: No    Sexually Abused: No       SCREENINGS    [place] Coma Scale  Eye Opening: Spontaneous  Best Verbal Response: Oriented  Best Motor Response: Obeys commands  Marcelo Coma Scale Score: 15        PHYSICAL EXAM    (up to 7 for level 4, 8 or more for level 5)     ED Triage Vitals [23]   BP Temp Temp Source Heart Rate Resp SpO2 Height Weight   133/80 98.1 °F (36.7 °C) Oral 88 15 98 % 5' 8\" (1.727 m) (!) 307 lb (139.3 kg)       Physical Exam  Vitals and nursing note reviewed. Constitutional:       General: She is not in acute distress. Appearance: Normal appearance.    HENT:      Head: Normocephalic and atraumatic. Right Ear: External ear normal.      Left Ear: External ear normal.      Nose: Nose normal.      Mouth/Throat:      Mouth: Mucous membranes are moist.      Pharynx: Oropharynx is clear. No oropharyngeal exudate. Eyes:      General: No scleral icterus. Conjunctiva/sclera: Conjunctivae normal.      Pupils: Pupils are equal, round, and reactive to light. Cardiovascular:      Rate and Rhythm: Normal rate and regular rhythm. Pulses: Normal pulses. Heart sounds: Normal heart sounds. Pulmonary:      Effort: Pulmonary effort is normal. No respiratory distress. Breath sounds: Normal breath sounds. No stridor. No wheezing, rhonchi or rales. Abdominal:      General: Bowel sounds are normal. There is no distension. Palpations: Abdomen is soft. Tenderness: There is abdominal tenderness (Postoperative incision sites are healing well without erythema or drainage. Patient has tenderness to palpation around each of the sites. Majority of tenderness located in right upper quadrant with deep palpation. ). There is no right CVA tenderness, left CVA tenderness, guarding or rebound. Comments: AGNIESZKA site appears clean without erythema or drainage and appears to be firmly secured. AGNIESZKA drain two thirds full with blood-tinged drainage. Musculoskeletal:         General: No tenderness or deformity. Cervical back: Neck supple. No rigidity. Skin:     General: Skin is warm and dry. Capillary Refill: Capillary refill takes less than 2 seconds. Coloration: Skin is not jaundiced. Neurological:      General: No focal deficit present. Mental Status: She is alert and oriented to person, place, and time. Mental status is at baseline.       Coordination: Coordination normal.   Psychiatric:         Mood and Affect: Mood normal.         Behavior: Behavior normal.       DIAGNOSTIC RESULTS     EKG: All EKG's areinterpreted by the Emergency Department Physician who either signs or Co-signs this chart in the absence of a cardiologist.    EKG dated 1/23/2023 at 20 3:50 PM: Normal sinus rhythm, rate 65. Normal EKG. RADIOLOGY:  Non-plain film images such as CT, Ultrasound and MRI are read by the radiologist. Plain radiographic images are visualized and preliminarily interpreted bythe emergency physician with the below findings:      XR CHEST PORTABLE   Final Result   Hypoventilatory lungs. Mild left basilar atelectasis or scar. Electronically signed by Bishop Gerardo MD on 01-24-23 at [address] Additional Contrast? None   Final Result   No acute findings in the abdomen or pelvis. Communications:01/24/23 00:16 Verify Receipt Verified receipt with  Dr. Carmen Marrufo on 01/24 00:16 (-06:00)Electronically signed by Patricia Randall MD on 01-23-23 at 2340              LABS:  Labs Reviewed   CBC WITH AUTO DIFFERENTIAL - Abnormal; Notable for the following components:       Result Value    WBC 11.7 (*)     MCHC 31.5 (*)     Monocytes Absolute 1.00 (*)     All other components within normal limits   COMPREHENSIVE METABOLIC PANEL - Abnormal; Notable for the following components:    Calcium 8.4 (*)     Total Protein 6.1 (*)     Albumin 3.3 (*)     Alkaline Phosphatase 105 (*)     ALT 37 (*)     All other components within normal limits   URINALYSIS WITH REFLEX TO CULTURE - Abnormal; Notable for the following components:    Leukocyte Esterase, Urine MODERATE (*)     All other components within normal limits   MICROSCOPIC URINALYSIS - Abnormal; Notable for the following components:    Bacteria, UA NEGATIVE (*)     Crystals, UA NEG (*)     WBC, UA 13 (*)     All other components within normal limits   CULTURE, URINE    Narrative:     ORDER#: [id number]                          ORDERED BY: LUIS FERNANDO VALENCIA  SOURCE: Urine Clean Catch                  COLLECTED:  01/23/23 21:46  ANTIBIOTICS AT ENRIQUE.:                      RECEIVED :  01/23/23 21:50   LIPASE   HCG, SERUM, QUALITATIVE       All other labs were within normal range or not returned as of this dictation. EMERGENCY DEPARTMENT COURSE and DIFFERENTIAL DIAGNOSIS/MDM:   Vitals:    Vitals:    01/23/23 2015 01/23/23 2304 01/24/23 0230   BP: 133/80 126/66 119/76   Pulse: 88 82 89   Resp: 15 18 18   Temp: 98.1 °F (36.7 °C)     TempSrc: Oral     SpO2: 98% 98% 98%   Weight: (!) 307 lb (139.3 kg)     Height: 5' 8\" (1.727 m)         MDM     Amount and/or Complexity of Data Reviewed  Clinical lab tests: reviewed  Tests in the radiology section of CPT®: reviewed  Decide to obtain previous medical records or to obtain history from someone other than the patient: yes    51-year-old female who is 3 days postoperative cholecystectomy presents with abdominal pain. Patient has stopped taking her narcotic pain reliever in 24 hours. Patient denies fever or vomiting. On physical examination, patient has tenderness without guarding or rebound  Lab and radiology results reviewed. Patient appears to be doing well postoperatively. Pain medication is administered with some improvement. Patient becomes very anxious and begins stating loudly \"I can't breathe. \"  Patient is visibly hyperventilating and pacing the room. EKG and chest x-ray are obtained and are normal.  Patient receives Percocet 5/325, Toradol IV, Ativan 0.5 mg IV and simethicone. Anxiety improves and patient discharged to home to follow-up with her PCP, Brenden HONEYCUTT and Dr. Brian Cash, her general surgeon. Patient will return with increasing or severe pain, persistent vomiting, fever greater than 100.5, or other concerns. Patient was given ceftriaxone 1 g IV for acute urinary tract infection. Urine culture is pending. Patient will be discharged with Keflex p.o. CONSULTS:  None    PROCEDURES:  Unless otherwise noted below, none     Procedures    FINAL IMPRESSION      1. Right upper quadrant abdominal pain    2.  Acute urinary tract infection DISPOSITION/PLAN   DISPOSITION Decision To Discharge 01/24/2023 02:31:55 AM      PATIENT REFERRED TO:  Loni Ramirez [place] [id number]  [phone]    Schedule an appointment as soon as possible for a visit         DISCHARGE MEDICATIONS:  Discharge Medication List as of 1/24/2023 12:04 AM        START taking these medications    Details   cephALEXin (KEFLEX) 500 MG capsule Take 1 capsule by mouth 2 times daily for 7 days, Disp-14 capsule, R-0Normal      oxyCODONE (ROXICODONE) 5 MG immediate release tablet Take 1 tablet by mouth every 6 hours as needed for Pain for up to 3 days. Intended supply: 3 days.  Take lowest dose possible to manage pain Max Daily Amount: 20 mg, Disp-12 tablet, R-0Normal                (Please note that portions of this note were completed with a voice recognition program.  Efforts were made to edit thedictations but occasionally words are mis-transcribed.)    Vandana Galan MD (electronically signed)  Attending Emergency Physician          Vandana Galan MD  01/27/23 CHRISTINE Landin [address] Gregg Maharaj MD  01/27/23 [id number]

## 2023-01-27 NOTE — PROGRESS NOTES
[facility] General Surgery Progress Note    Chief Complaint:  Chief Complaint   Patient presents with    Post-op Problem     Pt had cholecystectomy on Fri, pt states Dr. Noe Negron advised evaluation. Pt states she began throwing up bile last night, and is having issues with her drain not closing. ABCs are intact. Skin wpd. A&ox4. POD # 7    S: Mrs. Hardwick is seen and examiend at bedside. She feels much improved today. She continues to have right upper abdominal pain. She is hungry and would like regular food. She would like to shower. O:   BP 96/83   Pulse 93   Temp 97.7 °F (36.5 °C) (Oral)   Resp 16   LMP 01/09/2023   SpO2 92%   I/O reviewed and appropriate  CONSTITUTIONAL: Alert, appropriate, no acute distress. Obese. SKIN: warm, dry with no rashes or lesions  HEENT: NCAT, Non icteric, PERR. Trachea Midline. CHEST/LUNGS: CTA bilaterally. Normal respiratory effort. CARDIOVASCULAR: RRR, No edema. ABDOMEN: soft, ND, appropriately TTP, +BS. Bilious drain output. Incisions: Clean, dry, and intact with no erythema or induration  NEUROLOGIC: CN II-XI grossly intact, no motor or sensory deficits   MUSCULOSKELETAL: No clubbing or cyanosis. PSYCHIATRIC:  Normal Mood and Affect. Alert and Oriented.     LABS:   CBC:   Recent Labs     01/25/23  1511 01/26/23  0245 01/27/23 0225   WBC 20.4* 22.4* 24.9*   RBC 5.07 5.13 4.73   HGB 14.3 14.7 13.2   HCT 45.6 45.6 41.3   * 297 371   LYMPHOPCT 14.0* 10.0* 5.7*   MONOPCT 6.1 7.2 7.0   BASOPCT 0.3 0.3 0.2   MONOSABS 1.20* 1.60* 1.80*   LYMPHSABS 2.9 2.2 1.4   EOSABS 0.10 0.10 0.00   BASOSABS 0.10 0.10 0.10      BMP:   Recent Labs     01/25/23  1511 01/26/23  0245 01/27/23 0225    136 133*   K 4.6 4.7 4.7    101 99   CO2 23 20* 18*   ANIONGAP 11 15 16   GLUCOSE 103 91 129*   CREATININE 0.5 0.5 0.6   LABGLOM >60 >60 >60   CALCIUM 9.4 9.2 9.2     LFT:   Recent Labs     01/25/23  1511 01/26/23  0245 01/27/23  0225   PROT 7.3 6.2* 5.9*   LABALBU 3.8 3.7 3.3*   BILITOT 0.6 0.6 0.8   ALKPHOS 135* 134* 137*   ALT 33 38* 40*   AST 18 25 24       A: Active Problems:    Bile leak, postoperative    Class 3 severe obesity due to excess calories in adult [facility])    Right upper quadrant abdominal pain  Resolved Problems:    * No resolved hospital problems. *      P:   Analgesia prn. Hemodynamically stable. Continue zosyn for bile leak and associated leukocytosis. Elevated LFT's 2/2 bile leak. Should improve now following ERCP. Continue AGNIESZKA drain. Encourage IS usage and ambulation. Trend leukocytosis. Dr. Chrystal Varela is covering over the weekend.        Esau Show, DO   Electronically Signed on 1/27/2023 at 10:12 AM

## 2023-01-28 LAB
ALBUMIN SERPL-MCNC: 2.7 G/DL (ref 3.5–5.2)
ALP BLD-CCNC: 115 U/L (ref 35–104)
ALT SERPL-CCNC: 27 U/L (ref 5–33)
ANION GAP SERPL CALCULATED.3IONS-SCNC: 9 MMOL/L (ref 7–19)
AST SERPL-CCNC: 13 U/L (ref 5–32)
BASOPHILS ABSOLUTE: 0.1 K/UL (ref 0–0.2)
BASOPHILS RELATIVE PERCENT: 0.6 % (ref 0–1)
BILIRUB SERPL-MCNC: 0.4 MG/DL (ref 0.2–1.2)
BUN BLDV-MCNC: 24 MG/DL (ref 6–20)
CALCIUM SERPL-MCNC: 8.4 MG/DL (ref 8.6–10)
CHLORIDE BLD-SCNC: 106 MMOL/L (ref 98–111)
CO2: 24 MMOL/L (ref 22–29)
CREAT SERPL-MCNC: 0.7 MG/DL (ref 0.5–0.9)
EOSINOPHILS ABSOLUTE: 0.6 K/UL (ref 0–0.6)
EOSINOPHILS RELATIVE PERCENT: 4.3 % (ref 0–5)
GFR SERPL CREATININE-BSD FRML MDRD: >60 ML/MIN/{1.73_M2}
GLUCOSE BLD-MCNC: 84 MG/DL (ref 74–109)
HCT VFR BLD CALC: 35.8 % (ref 37–47)
HEMOGLOBIN: 11.6 G/DL (ref 12–16)
IMMATURE GRANULOCYTES #: 0.1 K/UL
LYMPHOCYTES ABSOLUTE: 3.9 K/UL (ref 1.1–4.5)
LYMPHOCYTES RELATIVE PERCENT: 27.8 % (ref 20–40)
MCH RBC QN AUTO: 28.3 PG (ref 27–31)
MCHC RBC AUTO-ENTMCNC: 32.4 G/DL (ref 33–37)
MCV RBC AUTO: 87.3 FL (ref 81–99)
MONOCYTES ABSOLUTE: 1.4 K/UL (ref 0–0.9)
MONOCYTES RELATIVE PERCENT: 10.3 % (ref 0–10)
NEUTROPHILS ABSOLUTE: 7.8 K/UL (ref 1.5–7.5)
NEUTROPHILS RELATIVE PERCENT: 56.3 % (ref 50–65)
PDW BLD-RTO: 14.3 % (ref 11.5–14.5)
PLATELET # BLD: 354 K/UL (ref 130–400)
PMV BLD AUTO: 11.3 FL (ref 9.4–12.3)
POTASSIUM REFLEX MAGNESIUM: 4 MMOL/L (ref 3.5–5)
RBC # BLD: 4.1 M/UL (ref 4.2–5.4)
SODIUM BLD-SCNC: 139 MMOL/L (ref 136–145)
TOTAL PROTEIN: 5.5 G/DL (ref 6.6–8.7)
TROPONIN: <0.01 NG/ML (ref 0–0.03)
WBC # BLD: 13.9 K/UL (ref 4.8–10.8)

## 2023-01-28 PROCEDURE — 1210000000 HC MED SURG R&B

## 2023-01-28 PROCEDURE — 6370000000 HC RX 637 (ALT 250 FOR IP): Performed by: SURGERY

## 2023-01-28 PROCEDURE — 84484 ASSAY OF TROPONIN QUANT: CPT

## 2023-01-28 PROCEDURE — 2580000003 HC RX 258: Performed by: SURGERY

## 2023-01-28 PROCEDURE — 36415 COLL VENOUS BLD VENIPUNCTURE: CPT

## 2023-01-28 PROCEDURE — 80053 COMPREHEN METABOLIC PANEL: CPT

## 2023-01-28 PROCEDURE — 85025 COMPLETE CBC W/AUTO DIFF WBC: CPT

## 2023-01-28 PROCEDURE — 2580000003 HC RX 258: Performed by: ANESTHESIOLOGY

## 2023-01-28 PROCEDURE — 99024 POSTOP FOLLOW-UP VISIT: CPT | Performed by: SURGERY

## 2023-01-28 PROCEDURE — 94760 N-INVAS EAR/PLS OXIMETRY 1: CPT

## 2023-01-28 PROCEDURE — 6360000002 HC RX W HCPCS: Performed by: SURGERY

## 2023-01-28 PROCEDURE — 93005 ELECTROCARDIOGRAM TRACING: CPT | Performed by: SURGERY

## 2023-01-28 RX ORDER — HYDROCODONE BITARTRATE AND ACETAMINOPHEN 5; 325 MG/1; MG/1
2 TABLET ORAL EVERY 4 HOURS PRN
Status: DISCONTINUED | OUTPATIENT
Start: 2023-01-28 | End: 2023-01-30 | Stop reason: HOSPADM

## 2023-01-28 RX ORDER — AMOXICILLIN AND CLAVULANATE POTASSIUM 875; 125 MG/1; MG/1
1 TABLET, FILM COATED ORAL EVERY 12 HOURS SCHEDULED
Status: DISCONTINUED | OUTPATIENT
Start: 2023-01-28 | End: 2023-01-30 | Stop reason: HOSPADM

## 2023-01-28 RX ORDER — HYDROCODONE BITARTRATE AND ACETAMINOPHEN 5; 325 MG/1; MG/1
1 TABLET ORAL EVERY 4 HOURS PRN
Status: DISCONTINUED | OUTPATIENT
Start: 2023-01-28 | End: 2023-01-30 | Stop reason: HOSPADM

## 2023-01-28 RX ORDER — DOCUSATE SODIUM 100 MG/1
100 CAPSULE, LIQUID FILLED ORAL DAILY
Status: DISCONTINUED | OUTPATIENT
Start: 2023-01-28 | End: 2023-01-30 | Stop reason: HOSPADM

## 2023-01-28 RX ADMIN — ENOXAPARIN SODIUM 30 MG: 100 INJECTION SUBCUTANEOUS at 09:07

## 2023-01-28 RX ADMIN — PANTOPRAZOLE SODIUM 40 MG: 40 TABLET, DELAYED RELEASE ORAL at 15:52

## 2023-01-28 RX ADMIN — HYDROMORPHONE HYDROCHLORIDE 1 MG: 1 INJECTION, SOLUTION INTRAMUSCULAR; INTRAVENOUS; SUBCUTANEOUS at 09:17

## 2023-01-28 RX ADMIN — ACETAMINOPHEN 1000 MG: 500 TABLET, FILM COATED ORAL at 13:56

## 2023-01-28 RX ADMIN — HYDROMORPHONE HYDROCHLORIDE 2 MG: 1 INJECTION, SOLUTION INTRAMUSCULAR; INTRAVENOUS; SUBCUTANEOUS at 21:16

## 2023-01-28 RX ADMIN — METHOCARBAMOL 500 MG: 500 TABLET ORAL at 09:06

## 2023-01-28 RX ADMIN — HYDROCODONE BITARTRATE AND ACETAMINOPHEN 1 TABLET: 5; 325 TABLET ORAL at 19:49

## 2023-01-28 RX ADMIN — KETOROLAC TROMETHAMINE 30 MG: 30 INJECTION, SOLUTION INTRAMUSCULAR; INTRAVENOUS at 02:52

## 2023-01-28 RX ADMIN — SODIUM CHLORIDE, PRESERVATIVE FREE 10 ML: 5 INJECTION INTRAVENOUS at 19:49

## 2023-01-28 RX ADMIN — ACETAMINOPHEN 1000 MG: 500 TABLET, FILM COATED ORAL at 05:52

## 2023-01-28 RX ADMIN — KETOROLAC TROMETHAMINE 30 MG: 30 INJECTION, SOLUTION INTRAMUSCULAR; INTRAVENOUS at 09:06

## 2023-01-28 RX ADMIN — HYDROCODONE BITARTRATE AND ACETAMINOPHEN 1 TABLET: 5; 325 TABLET ORAL at 13:03

## 2023-01-28 RX ADMIN — DOCUSATE SODIUM 100 MG: 100 CAPSULE, LIQUID FILLED ORAL at 13:56

## 2023-01-28 RX ADMIN — BUSPIRONE HYDROCHLORIDE 15 MG: 15 TABLET ORAL at 09:06

## 2023-01-28 RX ADMIN — PIPERACILLIN AND TAZOBACTAM 3375 MG: 3; .375 INJECTION, POWDER, FOR SOLUTION INTRAVENOUS at 01:13

## 2023-01-28 RX ADMIN — METHOCARBAMOL 500 MG: 500 TABLET ORAL at 13:55

## 2023-01-28 RX ADMIN — PIPERACILLIN AND TAZOBACTAM 3375 MG: 3; .375 INJECTION, POWDER, FOR SOLUTION INTRAVENOUS at 09:17

## 2023-01-28 RX ADMIN — AMOXICILLIN AND CLAVULANATE POTASSIUM 1 TABLET: 875; 125 TABLET, FILM COATED ORAL at 19:49

## 2023-01-28 RX ADMIN — BUSPIRONE HYDROCHLORIDE 15 MG: 15 TABLET ORAL at 13:55

## 2023-01-28 RX ADMIN — HYDROCODONE BITARTRATE AND ACETAMINOPHEN 1 TABLET: 5; 325 TABLET ORAL at 23:55

## 2023-01-28 RX ADMIN — METHOCARBAMOL 500 MG: 500 TABLET ORAL at 19:49

## 2023-01-28 RX ADMIN — ENOXAPARIN SODIUM 30 MG: 100 INJECTION SUBCUTANEOUS at 19:48

## 2023-01-28 RX ADMIN — LAMOTRIGINE 150 MG: 100 TABLET ORAL at 09:07

## 2023-01-28 RX ADMIN — HYDROMORPHONE HYDROCHLORIDE 2 MG: 1 INJECTION, SOLUTION INTRAMUSCULAR; INTRAVENOUS; SUBCUTANEOUS at 05:52

## 2023-01-28 RX ADMIN — KETOROLAC TROMETHAMINE 30 MG: 30 INJECTION, SOLUTION INTRAMUSCULAR; INTRAVENOUS at 15:52

## 2023-01-28 RX ADMIN — BUSPIRONE HYDROCHLORIDE 15 MG: 15 TABLET ORAL at 19:50

## 2023-01-28 RX ADMIN — PANTOPRAZOLE SODIUM 40 MG: 40 TABLET, DELAYED RELEASE ORAL at 05:52

## 2023-01-28 RX ADMIN — HYDROMORPHONE HYDROCHLORIDE 2 MG: 1 INJECTION, SOLUTION INTRAMUSCULAR; INTRAVENOUS; SUBCUTANEOUS at 02:52

## 2023-01-28 ASSESSMENT — PAIN SCALES - GENERAL
PAINLEVEL_OUTOF10: 5
PAINLEVEL_OUTOF10: 5
PAINLEVEL_OUTOF10: 7
PAINLEVEL_OUTOF10: 5
PAINLEVEL_OUTOF10: 7
PAINLEVEL_OUTOF10: 3

## 2023-01-28 ASSESSMENT — PAIN DESCRIPTION - DESCRIPTORS
DESCRIPTORS: THROBBING
DESCRIPTORS: ACHING;CRAMPING
DESCRIPTORS: DULL;DISCOMFORT
DESCRIPTORS: ACHING;CRAMPING
DESCRIPTORS: ACHING
DESCRIPTORS: ACHING;DISCOMFORT
DESCRIPTORS: ACHING;CRAMPING

## 2023-01-28 ASSESSMENT — PAIN DESCRIPTION - ORIENTATION
ORIENTATION: MID;UPPER
ORIENTATION: MID
ORIENTATION: MID
ORIENTATION: RIGHT
ORIENTATION: MID;UPPER
ORIENTATION: RIGHT

## 2023-01-28 ASSESSMENT — PAIN DESCRIPTION - LOCATION
LOCATION: ABDOMEN

## 2023-01-28 NOTE — PROGRESS NOTES
Subjective:  No acute events or changes. Tolerating PO. She does state that she is having some chest pain and indicates the epigastric area. Passing flatus, no BM. States she has not been out of bed yet. Objective:  /68   Pulse 75   Temp 97.3 °F (36.3 °C) (Temporal)   Resp 18   LMP 01/09/2023   SpO2 98%   General: NAD, AAO  Chest: regular, non-labored  Abdomen: Soft, obese, ND, ATTP, incisions C/D/I, AGNIESZKA with bilious drainage    CBC:   Lab Results   Component Value Date/Time    WBC 13.9 01/28/2023 02:16 AM    RBC 4.10 01/28/2023 02:16 AM    HGB 11.6 01/28/2023 02:16 AM    HCT 35.8 01/28/2023 02:16 AM    MCV 87.3 01/28/2023 02:16 AM    MCH 28.3 01/28/2023 02:16 AM    MCHC 32.4 01/28/2023 02:16 AM    RDW 14.3 01/28/2023 02:16 AM     01/28/2023 02:16 AM    MPV 11.3 01/28/2023 02:16 AM     CMP:    Lab Results   Component Value Date/Time     01/28/2023 02:16 AM    K 4.0 01/28/2023 02:16 AM     01/28/2023 02:16 AM    CO2 24 01/28/2023 02:16 AM    BUN 24 01/28/2023 02:16 AM    CREATININE 0.7 01/28/2023 02:16 AM    LABGLOM >60 01/28/2023 02:16 AM    GLUCOSE 84 01/28/2023 02:16 AM    PROT 5.5 01/28/2023 02:16 AM    LABALBU 2.7 01/28/2023 02:16 AM    CALCIUM 8.4 01/28/2023 02:16 AM    BILITOT 0.4 01/28/2023 02:16 AM    ALKPHOS 115 01/28/2023 02:16 AM    AST 13 01/28/2023 02:16 AM    ALT 27 01/28/2023 02:16 AM     Assessment and plan:  29year old female s/p ERCP for bile leak  Continue with AGNIESZKA  WBC has improved. Will switch to oral antibiotics  Add oral pain medication.  Will go ahead and check EKG and troponin- while I feel this is most likely related to her stent and bile leak, but will check these to make sure  Will add bowel regimen  Continue lovenox prophylaxis  Will order for patient to ambulate

## 2023-01-29 ENCOUNTER — APPOINTMENT (OUTPATIENT)
Dept: CT IMAGING | Age: 33
DRG: 394 | End: 2023-01-29
Payer: MEDICAID

## 2023-01-29 ENCOUNTER — APPOINTMENT (OUTPATIENT)
Dept: GENERAL RADIOLOGY | Age: 33
DRG: 394 | End: 2023-01-29
Payer: MEDICAID

## 2023-01-29 PROBLEM — R07.9 CHEST PAIN: Status: ACTIVE | Noted: 2023-01-29

## 2023-01-29 LAB
ANION GAP SERPL CALCULATED.3IONS-SCNC: 9 MMOL/L (ref 7–19)
BUN BLDV-MCNC: 12 MG/DL (ref 6–20)
CALCIUM SERPL-MCNC: 8.5 MG/DL (ref 8.6–10)
CHLORIDE BLD-SCNC: 103 MMOL/L (ref 98–111)
CO2: 25 MMOL/L (ref 22–29)
CREAT SERPL-MCNC: 0.6 MG/DL (ref 0.5–0.9)
D DIMER: 1.11 UG/ML FEU (ref 0–0.48)
EKG P AXIS: 30 DEGREES
EKG P-R INTERVAL: 166 MS
EKG Q-T INTERVAL: 368 MS
EKG QRS DURATION: 86 MS
EKG QTC CALCULATION (BAZETT): 383 MS
EKG T AXIS: 24 DEGREES
GFR SERPL CREATININE-BSD FRML MDRD: >60 ML/MIN/{1.73_M2}
GLUCOSE BLD-MCNC: 93 MG/DL (ref 74–109)
HCT VFR BLD CALC: 35.6 % (ref 37–47)
HEMOGLOBIN: 11.2 G/DL (ref 12–16)
MCH RBC QN AUTO: 27.7 PG (ref 27–31)
MCHC RBC AUTO-ENTMCNC: 31.5 G/DL (ref 33–37)
MCV RBC AUTO: 88.1 FL (ref 81–99)
PDW BLD-RTO: 14.6 % (ref 11.5–14.5)
PLATELET # BLD: 363 K/UL (ref 130–400)
PMV BLD AUTO: 10.8 FL (ref 9.4–12.3)
POTASSIUM REFLEX MAGNESIUM: 4 MMOL/L (ref 3.5–5)
PRO-BNP: 238 PG/ML (ref 0–450)
RBC # BLD: 4.04 M/UL (ref 4.2–5.4)
SODIUM BLD-SCNC: 137 MMOL/L (ref 136–145)
TROPONIN: <0.01 NG/ML (ref 0–0.03)
WBC # BLD: 10.4 K/UL (ref 4.8–10.8)

## 2023-01-29 PROCEDURE — 85379 FIBRIN DEGRADATION QUANT: CPT

## 2023-01-29 PROCEDURE — 2580000003 HC RX 258: Performed by: ANESTHESIOLOGY

## 2023-01-29 PROCEDURE — 93010 ELECTROCARDIOGRAM REPORT: CPT | Performed by: INTERNAL MEDICINE

## 2023-01-29 PROCEDURE — 1210000000 HC MED SURG R&B

## 2023-01-29 PROCEDURE — 93005 ELECTROCARDIOGRAM TRACING: CPT | Performed by: NURSE PRACTITIONER

## 2023-01-29 PROCEDURE — 6360000002 HC RX W HCPCS: Performed by: SURGERY

## 2023-01-29 PROCEDURE — 36415 COLL VENOUS BLD VENIPUNCTURE: CPT

## 2023-01-29 PROCEDURE — 6360000004 HC RX CONTRAST MEDICATION: Performed by: NURSE PRACTITIONER

## 2023-01-29 PROCEDURE — 80048 BASIC METABOLIC PNL TOTAL CA: CPT

## 2023-01-29 PROCEDURE — 71045 X-RAY EXAM CHEST 1 VIEW: CPT | Performed by: RADIOLOGY

## 2023-01-29 PROCEDURE — 71045 X-RAY EXAM CHEST 1 VIEW: CPT

## 2023-01-29 PROCEDURE — 6370000000 HC RX 637 (ALT 250 FOR IP): Performed by: SURGERY

## 2023-01-29 PROCEDURE — 84484 ASSAY OF TROPONIN QUANT: CPT

## 2023-01-29 PROCEDURE — 85027 COMPLETE CBC AUTOMATED: CPT

## 2023-01-29 PROCEDURE — 83880 ASSAY OF NATRIURETIC PEPTIDE: CPT

## 2023-01-29 PROCEDURE — 71275 CT ANGIOGRAPHY CHEST: CPT

## 2023-01-29 PROCEDURE — 6360000002 HC RX W HCPCS: Performed by: INTERNAL MEDICINE

## 2023-01-29 PROCEDURE — 71275 CT ANGIOGRAPHY CHEST: CPT | Performed by: RADIOLOGY

## 2023-01-29 PROCEDURE — 99024 POSTOP FOLLOW-UP VISIT: CPT | Performed by: SURGERY

## 2023-01-29 RX ADMIN — IOPAMIDOL 70 ML: 755 INJECTION, SOLUTION INTRAVENOUS at 23:05

## 2023-01-29 RX ADMIN — HYDROMORPHONE HYDROCHLORIDE 2 MG: 1 INJECTION, SOLUTION INTRAMUSCULAR; INTRAVENOUS; SUBCUTANEOUS at 23:58

## 2023-01-29 RX ADMIN — ENOXAPARIN SODIUM 30 MG: 100 INJECTION SUBCUTANEOUS at 20:27

## 2023-01-29 RX ADMIN — HYDROMORPHONE HYDROCHLORIDE 2 MG: 1 INJECTION, SOLUTION INTRAMUSCULAR; INTRAVENOUS; SUBCUTANEOUS at 06:32

## 2023-01-29 RX ADMIN — HYDROMORPHONE HYDROCHLORIDE 2 MG: 1 INJECTION, SOLUTION INTRAMUSCULAR; INTRAVENOUS; SUBCUTANEOUS at 16:56

## 2023-01-29 RX ADMIN — DOCUSATE SODIUM 100 MG: 100 CAPSULE, LIQUID FILLED ORAL at 09:46

## 2023-01-29 RX ADMIN — PANTOPRAZOLE SODIUM 40 MG: 40 TABLET, DELAYED RELEASE ORAL at 06:22

## 2023-01-29 RX ADMIN — AMOXICILLIN AND CLAVULANATE POTASSIUM 1 TABLET: 875; 125 TABLET, FILM COATED ORAL at 20:27

## 2023-01-29 RX ADMIN — BUSPIRONE HYDROCHLORIDE 15 MG: 15 TABLET ORAL at 09:46

## 2023-01-29 RX ADMIN — ACETAMINOPHEN 1000 MG: 500 TABLET, FILM COATED ORAL at 16:56

## 2023-01-29 RX ADMIN — ACETAMINOPHEN 1000 MG: 500 TABLET, FILM COATED ORAL at 20:27

## 2023-01-29 RX ADMIN — METHOCARBAMOL 500 MG: 500 TABLET ORAL at 16:56

## 2023-01-29 RX ADMIN — HYDROMORPHONE HYDROCHLORIDE 2 MG: 1 INJECTION, SOLUTION INTRAMUSCULAR; INTRAVENOUS; SUBCUTANEOUS at 12:51

## 2023-01-29 RX ADMIN — METHOCARBAMOL 500 MG: 500 TABLET ORAL at 20:27

## 2023-01-29 RX ADMIN — SODIUM CHLORIDE, PRESERVATIVE FREE 10 ML: 5 INJECTION INTRAVENOUS at 20:10

## 2023-01-29 RX ADMIN — AMOXICILLIN AND CLAVULANATE POTASSIUM 1 TABLET: 875; 125 TABLET, FILM COATED ORAL at 09:46

## 2023-01-29 RX ADMIN — BUSPIRONE HYDROCHLORIDE 15 MG: 15 TABLET ORAL at 16:56

## 2023-01-29 RX ADMIN — ACETAMINOPHEN 1000 MG: 500 TABLET, FILM COATED ORAL at 06:22

## 2023-01-29 RX ADMIN — PROMETHAZINE HYDROCHLORIDE 12.5 MG: 12.5 TABLET ORAL at 02:10

## 2023-01-29 RX ADMIN — BUSPIRONE HYDROCHLORIDE 15 MG: 15 TABLET ORAL at 20:27

## 2023-01-29 RX ADMIN — HYDROMORPHONE HYDROCHLORIDE 2 MG: 1 INJECTION, SOLUTION INTRAMUSCULAR; INTRAVENOUS; SUBCUTANEOUS at 02:10

## 2023-01-29 RX ADMIN — HYDROMORPHONE HYDROCHLORIDE 2 MG: 1 INJECTION, SOLUTION INTRAMUSCULAR; INTRAVENOUS; SUBCUTANEOUS at 20:09

## 2023-01-29 RX ADMIN — ENOXAPARIN SODIUM 30 MG: 100 INJECTION SUBCUTANEOUS at 09:47

## 2023-01-29 RX ADMIN — LAMOTRIGINE 150 MG: 100 TABLET ORAL at 09:46

## 2023-01-29 RX ADMIN — PANTOPRAZOLE SODIUM 40 MG: 40 TABLET, DELAYED RELEASE ORAL at 16:56

## 2023-01-29 RX ADMIN — HYDROCODONE BITARTRATE AND ACETAMINOPHEN 2 TABLET: 5; 325 TABLET ORAL at 21:59

## 2023-01-29 ASSESSMENT — PAIN DESCRIPTION - DESCRIPTORS
DESCRIPTORS: ACHING;CRAMPING
DESCRIPTORS: ACHING;SPASM;STABBING
DESCRIPTORS: SPASM
DESCRIPTORS: SPASM;ACHING;STABBING
DESCRIPTORS: ACHING
DESCRIPTORS: ACHING;CRAMPING

## 2023-01-29 ASSESSMENT — PAIN SCALES - GENERAL
PAINLEVEL_OUTOF10: 3
PAINLEVEL_OUTOF10: 7
PAINLEVEL_OUTOF10: 8
PAINLEVEL_OUTOF10: 5
PAINLEVEL_OUTOF10: 3
PAINLEVEL_OUTOF10: 8
PAINLEVEL_OUTOF10: 9
PAINLEVEL_OUTOF10: 7

## 2023-01-29 ASSESSMENT — ENCOUNTER SYMPTOMS
TROUBLE SWALLOWING: 0
SORE THROAT: 0
BLOOD IN STOOL: 0
ABDOMINAL DISTENTION: 0
CHEST TIGHTNESS: 1
DIARRHEA: 0
COUGH: 0
VOMITING: 0
WHEEZING: 0
ABDOMINAL PAIN: 1
SHORTNESS OF BREATH: 1
CONSTIPATION: 0
SINUS PAIN: 0
NAUSEA: 0

## 2023-01-29 ASSESSMENT — PAIN DESCRIPTION - ORIENTATION
ORIENTATION: UPPER;MID
ORIENTATION: MID;UPPER
ORIENTATION: MID
ORIENTATION: MID
ORIENTATION: MID;UPPER
ORIENTATION: MID
ORIENTATION: UPPER;MID

## 2023-01-29 ASSESSMENT — PAIN DESCRIPTION - LOCATION
LOCATION: CHEST
LOCATION: ABDOMEN
LOCATION: CHEST
LOCATION: ABDOMEN
LOCATION: CHEST
LOCATION: ABDOMEN
LOCATION: ABDOMEN

## 2023-01-29 ASSESSMENT — PAIN DESCRIPTION - FREQUENCY
FREQUENCY: CONTINUOUS

## 2023-01-29 ASSESSMENT — PAIN - FUNCTIONAL ASSESSMENT
PAIN_FUNCTIONAL_ASSESSMENT: PREVENTS OR INTERFERES SOME ACTIVE ACTIVITIES AND ADLS

## 2023-01-29 ASSESSMENT — PAIN DESCRIPTION - PAIN TYPE
TYPE: ACUTE PAIN

## 2023-01-29 ASSESSMENT — PAIN DESCRIPTION - ONSET
ONSET: ON-GOING

## 2023-01-29 NOTE — CONSULTS
[facility] - Consult      PCP: YINKA Moran    Date of Admission: 2023    Date of Service: 2023    Consult requested by: Deann Jason DO    Reason for consult: Chest pain    Chief Complaint:  Post op problem    History Of Present Illness: The patient is a 29 y.o. female with a PMH of GERD, ETOH abuse, anxiety who presented to [address] [place] [address] ED on 2023 with c/o worsening pain with possible bile leak s/p cholecystomy on 2023 per Dr. Travon Stein. She had ongoing pain with ? ??bile per JOSE and AGNIESZKA. She underwent an ERCP on 2023 per Dr. Keyur Moore. She reported improvement in her symptoms, however, continues to c/o midsternal chest/epigastric discomfort. She describes it as \"heavy and cramping\" and radiates to her back. She does have some mild SOA-which worsens with inhalation. She denies palpitation or leg swelling. She denies a previous cardiac hx. Troponin was drawn on 2023 and EKG was performed. The EKG had a questionable anterior T wave abnormality. Hospitalists were consulted to weight in on case. Chest discomfort is felt to be 2/2 to stent, however, cardiac origin was requested to be r/o'd. She denies smoking, but is a smoker by history. She c/o occasional cough. Denies fever.        Past Medical History:        Diagnosis Date    Anxiety     GERD (gastroesophageal reflux disease)     Headache     History of alcohol abuse     sober since 2022       Past Surgical History:        Procedure Laterality Date     SECTION      CHOLECYSTECTOMY      CHOLECYSTECTOMY, LAPAROSCOPIC N/A 2023    ROBOTIC ASSISTED LAPAROSCOPIC CHOLECYSTECTOMY WITH ICG performed by Deann Jason DO at [facility] OR    ERCP N/A 2023    Dr CHRISTINE Quinonez-w/1 cm biliary sphincterotomy, placemetn of a 10F x 7cm plastic biliary stent-Post op bile leak, repeat in 3 months    MYRINGOTOMY W/ TUBES      TUBAL LIGATION         Home Medications:  Prior to Admission medications    Medication Sig Start Date End Date Taking? Authorizing Provider   cephALEXin (KEFLEX) 500 MG capsule Take 1 capsule by mouth 2 times daily for 7 days 1/23/23 1/30/23  Mellisa Aggarwal MD   ibuprofen (ADVIL;MOTRIN) 800 MG tablet Take 1 tablet by mouth 3 times daily (with meals) for 5 days 1/21/23 1/26/23  Ji Montoya DO   omeprazole (PRILOSEC) 20 MG delayed release capsule Take 1 capsule by mouth every morning (before breakfast) 1/12/23   YINKA Alberts   Atogepant (QULIPTA) 10 MG TABS Take 10 mg by mouth daily 1/11/23   YINKA Day   busPIRone (BUSPAR) 15 MG tablet Take 15 mg by mouth 3 times daily 12/15/22   Historical Provider, MD   lamoTRIgine (LAMICTAL) 100 MG tablet Take 150 mg by mouth daily 12/15/22   Historical Provider, MD       Allergies:    Morphine    Social History:  The patient currently lives at home  Tobacco:   reports that she quit smoking about 2 months ago. Her smoking use included cigarettes. She smoked an average of .5 packs per day. She has never used smokeless tobacco.  Alcohol:   reports that she does not currently use alcohol. Illicit Drugs: denies    Family History:      Problem Relation Age of Onset    High Blood Pressure Mother     Heart Disease Father     High Blood Pressure Father     Cancer Paternal Grandmother     Colon Polyps Neg Hx     Colon Cancer Neg Hx        Review of Systems:   Review of Systems   Constitutional:  Negative for chills, diaphoresis, fatigue and fever. HENT:  Negative for congestion, ear pain, sinus pain, sore throat and trouble swallowing. Eyes:  Negative for visual disturbance. Respiratory:  Positive for chest tightness and shortness of breath. Negative for cough and wheezing. Cardiovascular:  Positive for chest pain. Negative for palpitations and leg swelling. Gastrointestinal:  Positive for abdominal pain. Negative for abdominal distention, blood in stool, constipation, diarrhea, nausea and vomiting.    Endocrine: Negative for cold intolerance and heat intolerance. Genitourinary:  Negative for difficulty urinating, flank pain, frequency and urgency. Musculoskeletal:  Negative for arthralgias and myalgias. Neurological:  Negative for dizziness, syncope, weakness, light-headedness, numbness and headaches. Hematological:  Does not bruise/bleed easily. Psychiatric/Behavioral:  Negative for agitation, confusion and dysphoric mood. Physical Examination:  Vitals: BP (!) 91/55   Pulse 60   Temp 98.2 °F (36.8 °C) (Temporal)   Resp 16   LMP 01/09/2023   SpO2 95%     Physical Exam  Vitals and nursing note reviewed. Constitutional:       General: She is not in acute distress. Appearance: Normal appearance. She is obese. She is ill-appearing. She is not toxic-appearing or diaphoretic. Comments: Prematurely aged   HENT:      Head: Normocephalic and atraumatic. Right Ear: External ear normal.      Left Ear: External ear normal.      Nose: Nose normal. No congestion or rhinorrhea. Mouth/Throat:      Mouth: Mucous membranes are moist.      Pharynx: Oropharynx is clear. Eyes:      General: No scleral icterus. Extraocular Movements: Extraocular movements intact. Conjunctiva/sclera: Conjunctivae normal.   Cardiovascular:      Rate and Rhythm: Normal rate and regular rhythm. Pulses: Normal pulses. Heart sounds: Normal heart sounds. No murmur heard. No friction rub. No gallop. Comments: Reproducible epigastric midsternal pain  Pulmonary:      Effort: Pulmonary effort is normal. No respiratory distress. Breath sounds: Normal breath sounds. No wheezing, rhonchi or rales. Abdominal:      General: Abdomen is flat. Bowel sounds are normal. There is no distension. Palpations: Abdomen is soft. Tenderness: There is no abdominal tenderness. Musculoskeletal:         General: No swelling. Normal range of motion. Cervical back: Normal range of motion and neck supple.       Right lower leg: No edema. Left lower leg: No edema. Skin:     General: Skin is warm and dry. Coloration: Skin is not jaundiced. Findings: No erythema, lesion or rash. Neurological:      General: No focal deficit present. Mental Status: She is alert and oriented to person, place, and time. Mental status is at baseline. Cranial Nerves: No cranial nerve deficit. Sensory: No sensory deficit. Motor: No weakness. Psychiatric:         Mood and Affect: Mood normal.         Behavior: Behavior normal.         Thought Content: Thought content normal.         Judgment: Judgment normal.        Diagnostic Data:  Imaging:   FL ERCP BILIARY S&I   Final Result      CT ABDOMEN PELVIS W IV CONTRAST Additional Contrast? None   Final Result   1. Postsurgical changes from cholecystectomy. New small volume simple appearing   free fluid in the pelvis may be reactive. No fluid collection is seen within the   gallbladder fossa. XR CHEST PORTABLE   Final Result   Low lung volumes, with elevation of the left hemidiaphragm. Left mid lung airspace opacity, atelectasis versus pneumonia. Recommendation: Follow up recommended   Dictated and Electronically Signed by Aline Ching at 25-Jan-2023 06:44:06 PM EST               NM HEPATOBILIARY   Final Result   IMPRESSION   1. No definite evidence of evidence of biliary leak. The gallbladder has been   surgically removed. 2.Rounded defect in the right hepatic lobe is nonspecific.       XR CHEST PORTABLE    (Results Pending)     CBC:  Recent Labs     01/27/23 0225 01/28/23 0216 01/29/23  0331   WBC 24.9* 13.9* 10.4   HGB 13.2 11.6* 11.2*   HCT 41.3 35.8* 35.6*    354 363     BMP:  Recent Labs     01/27/23 0225 01/28/23 0216   * 139   K 4.7 4.0   CL 99 106   CO2 18* 24   BUN 17 24*   CREATININE 0.6 0.7   CALCIUM 9.2 8.4*     Recent Labs     01/27/23 0225 01/28/23 0216   AST 24 13   ALT 40* 27   BILITOT 0.8 0.4   ALKPHOS 137* 115*     Coag Panel: No results for input(s): INR, PROTIME, APTT in the last 72 hours. Cardiac Enzymes:   Recent Labs     01/28/23  1314   TROPONINI <0.01     ABGs:No results found for: PHART, PO2ART, EQX7IFG  Urinalysis:  Lab Results   Component Value Date/Time    NITRU Negative 01/25/2023 05:22 PM    WBCUA 6-9 01/25/2023 05:22 PM    BACTERIA None Seen 01/25/2023 05:22 PM    RBCUA 6-10 01/25/2023 05:22 PM    BLOODU MODERATE 01/25/2023 05:22 PM    SPECGRAV 1.021 01/25/2023 05:22 PM    GLUCOSEU Negative 01/25/2023 05:22 PM       Active Hospital Problems    Diagnosis Date Noted    Chest pain [R07.9] 01/29/2023     Priority: Medium    Right upper quadrant abdominal pain [R10.11] 01/26/2023     Priority: Medium    Bile leak, postoperative [K91.89, K83.8] 01/25/2023     Priority: Medium    Class 3 severe obesity due to excess calories in [facility]) [E66.01] 01/25/2023     Priority: Medium       Assessment and Plan: Active Problems:    Bile leak, postoperative   -S/P ERCP with stent placement      Class 3 severe obesity due to excess calories in [facility])   -Noted      Right upper quadrant abdominal pain   -Ongoing-likely 2/2 to post-operative state and ERCP with stent placement   -Continue attending's diet recommendations   -Continue PPI      Chest pain   -tele   -2D echo    -D-Dimer-if postive, proceed with CTA pulmonary with contrast   -Trend troponins   -EKG from 1/28/2023 no overt abnormalities noted, ??lead placement   -EKG now and in the AM   -VTE prophylaxis   -Vitals per unit routine   -Strict I&O   -Labs in the AM   -Continue to monitor and manage with supportive medical care     Resolved Problems:    * No resolved hospital problems. *        DVT Prophylaxis:  Luznox    Jim Ambriz, YINKA  1/29/2023,12:25 PM           EMR Dragon/Transcription disclaimer:   Much of this encounter note is an electronic transcription/translation of spoken language to printed text.  The electronic translation of spoken language may permit erroneous, or at times, nonsensical words or phrases to be inadvertently transcribed; although attempts have made to review the note for such errors, some may still exist.

## 2023-01-29 NOTE — PROGRESS NOTES
Pt. C/o upper epigastric pain throughout shift however says pain is not new and hasn't gotten worse. Pt reports \"chest\" pain but points to upper stomach area and description of pain sounds like gas pains and expected pain r/t to procedures. I encouraged the use of incentive spirometer and also encouraged patient to walk several times.

## 2023-01-29 NOTE — PROGRESS NOTES
Subjective:  No acute events or changes. Still complaining of some chest pain. Hospitalsit consulted. Objective:  BP (!) 146/75   Pulse 79   Temp 98 °F (36.7 °C) (Temporal)   Resp 18   LMP 01/09/2023   SpO2 99%   General: NAD, AAO  Chest: Regular, non-labored  Abdomen: Soft, obese, ND, ATTP, incisions C/D/, AGNIESZKA with small amount of bilious output    CBC:   Lab Results   Component Value Date/Time    WBC 10.4 01/29/2023 03:31 AM    RBC 4.04 01/29/2023 03:31 AM    HGB 11.2 01/29/2023 03:31 AM    HCT 35.6 01/29/2023 03:31 AM    MCV 88.1 01/29/2023 03:31 AM    MCH 27.7 01/29/2023 03:31 AM    MCHC 31.5 01/29/2023 03:31 AM    RDW 14.6 01/29/2023 03:31 AM     01/29/2023 03:31 AM    MPV 10.8 01/29/2023 03:31 AM     BMP:    Lab Results   Component Value Date/Time     01/29/2023 12:51 PM    K 4.0 01/29/2023 12:51 PM     01/29/2023 12:51 PM    CO2 25 01/29/2023 12:51 PM    BUN 12 01/29/2023 12:51 PM    LABALBU 2.7 01/28/2023 02:16 AM    CREATININE 0.6 01/29/2023 12:51 PM    CALCIUM 8.5 01/29/2023 12:51 PM    LABGLOM >60 01/29/2023 12:51 PM    GLUCOSE 93 01/29/2023 12:51 PM     Assessment and plan:29year old female s/o ERCP for bile leak  Continue with AGNIESZKA drain, diet as tolerated  Continue medical workup per hospitalist for possible chest pain.  Likely related to stent, but need to rule out cardiac cause  Continue antibiotics  Continue lovenox and PPI

## 2023-01-30 VITALS
OXYGEN SATURATION: 95 % | TEMPERATURE: 97.9 F | RESPIRATION RATE: 18 BRPM | HEART RATE: 64 BPM | SYSTOLIC BLOOD PRESSURE: 107 MMHG | DIASTOLIC BLOOD PRESSURE: 68 MMHG

## 2023-01-30 LAB
EKG P AXIS: 36 DEGREES
EKG P AXIS: 67 DEGREES
EKG P-R INTERVAL: 154 MS
EKG P-R INTERVAL: 170 MS
EKG Q-T INTERVAL: 368 MS
EKG Q-T INTERVAL: 394 MS
EKG QRS DURATION: 86 MS
EKG QRS DURATION: 86 MS
EKG QTC CALCULATION (BAZETT): 382 MS
EKG QTC CALCULATION (BAZETT): 395 MS
EKG T AXIS: 34 DEGREES
EKG T AXIS: 39 DEGREES
LIPASE: 15 U/L (ref 13–60)

## 2023-01-30 PROCEDURE — 36415 COLL VENOUS BLD VENIPUNCTURE: CPT

## 2023-01-30 PROCEDURE — 6360000002 HC RX W HCPCS: Performed by: INTERNAL MEDICINE

## 2023-01-30 PROCEDURE — 6370000000 HC RX 637 (ALT 250 FOR IP): Performed by: SURGERY

## 2023-01-30 PROCEDURE — 83690 ASSAY OF LIPASE: CPT

## 2023-01-30 PROCEDURE — 99024 POSTOP FOLLOW-UP VISIT: CPT | Performed by: SURGERY

## 2023-01-30 PROCEDURE — 6370000000 HC RX 637 (ALT 250 FOR IP): Performed by: INTERNAL MEDICINE

## 2023-01-30 PROCEDURE — 93010 ELECTROCARDIOGRAM REPORT: CPT | Performed by: INTERNAL MEDICINE

## 2023-01-30 RX ORDER — DICYCLOMINE HYDROCHLORIDE 10 MG/1
20 CAPSULE ORAL
Qty: 120 CAPSULE | Refills: 3 | Status: SHIPPED | OUTPATIENT
Start: 2023-01-30

## 2023-01-30 RX ORDER — DICYCLOMINE HYDROCHLORIDE 10 MG/1
20 CAPSULE ORAL
Status: DISCONTINUED | OUTPATIENT
Start: 2023-01-30 | End: 2023-01-30 | Stop reason: HOSPADM

## 2023-01-30 RX ORDER — AMOXICILLIN AND CLAVULANATE POTASSIUM 875; 125 MG/1; MG/1
1 TABLET, FILM COATED ORAL EVERY 12 HOURS SCHEDULED
Qty: 20 TABLET | Refills: 0 | Status: SHIPPED | OUTPATIENT
Start: 2023-01-30 | End: 2023-02-09

## 2023-01-30 RX ORDER — HYDROCODONE BITARTRATE AND ACETAMINOPHEN 7.5; 325 MG/1; MG/1
1 TABLET ORAL EVERY 6 HOURS PRN
Qty: 20 TABLET | Refills: 0 | Status: SHIPPED | OUTPATIENT
Start: 2023-01-30 | End: 2023-02-04

## 2023-01-30 RX ORDER — METHOCARBAMOL 500 MG/1
500 TABLET, FILM COATED ORAL 3 TIMES DAILY
Qty: 15 TABLET | Refills: 0 | Status: SHIPPED | OUTPATIENT
Start: 2023-01-30 | End: 2023-02-04

## 2023-01-30 RX ADMIN — LAMOTRIGINE 150 MG: 100 TABLET ORAL at 07:33

## 2023-01-30 RX ADMIN — ENOXAPARIN SODIUM 30 MG: 100 INJECTION SUBCUTANEOUS at 07:32

## 2023-01-30 RX ADMIN — PANTOPRAZOLE SODIUM 40 MG: 40 TABLET, DELAYED RELEASE ORAL at 07:33

## 2023-01-30 RX ADMIN — HYDROMORPHONE HYDROCHLORIDE 2 MG: 1 INJECTION, SOLUTION INTRAMUSCULAR; INTRAVENOUS; SUBCUTANEOUS at 09:44

## 2023-01-30 RX ADMIN — HYDROMORPHONE HYDROCHLORIDE 2 MG: 1 INJECTION, SOLUTION INTRAMUSCULAR; INTRAVENOUS; SUBCUTANEOUS at 04:10

## 2023-01-30 RX ADMIN — AMOXICILLIN AND CLAVULANATE POTASSIUM 1 TABLET: 875; 125 TABLET, FILM COATED ORAL at 07:34

## 2023-01-30 RX ADMIN — BUSPIRONE HYDROCHLORIDE 15 MG: 15 TABLET ORAL at 07:34

## 2023-01-30 RX ADMIN — DOCUSATE SODIUM 100 MG: 100 CAPSULE, LIQUID FILLED ORAL at 07:33

## 2023-01-30 RX ADMIN — PROMETHAZINE HYDROCHLORIDE 12.5 MG: 12.5 TABLET ORAL at 00:03

## 2023-01-30 RX ADMIN — HYDROCODONE BITARTRATE AND ACETAMINOPHEN 2 TABLET: 5; 325 TABLET ORAL at 02:07

## 2023-01-30 RX ADMIN — HYDROCODONE BITARTRATE AND ACETAMINOPHEN 2 TABLET: 5; 325 TABLET ORAL at 07:33

## 2023-01-30 RX ADMIN — METHOCARBAMOL 500 MG: 500 TABLET ORAL at 07:33

## 2023-01-30 ASSESSMENT — PAIN DESCRIPTION - LOCATION
LOCATION: CHEST
LOCATION: ABDOMEN
LOCATION: CHEST
LOCATION: ABDOMEN

## 2023-01-30 ASSESSMENT — PAIN DESCRIPTION - ORIENTATION
ORIENTATION: MID;UPPER
ORIENTATION: MID
ORIENTATION: MID;UPPER
ORIENTATION: MID;UPPER

## 2023-01-30 ASSESSMENT — PAIN DESCRIPTION - FREQUENCY
FREQUENCY: CONTINUOUS
FREQUENCY: CONTINUOUS

## 2023-01-30 ASSESSMENT — PAIN DESCRIPTION - DESCRIPTORS
DESCRIPTORS: SHARP
DESCRIPTORS: ACHING;SPASM
DESCRIPTORS: SHARP
DESCRIPTORS: SPASM

## 2023-01-30 ASSESSMENT — PAIN DESCRIPTION - ONSET
ONSET: ON-GOING
ONSET: ON-GOING

## 2023-01-30 ASSESSMENT — PAIN SCALES - GENERAL
PAINLEVEL_OUTOF10: 8
PAINLEVEL_OUTOF10: 8
PAINLEVEL_OUTOF10: 7
PAINLEVEL_OUTOF10: 7

## 2023-01-30 ASSESSMENT — PAIN - FUNCTIONAL ASSESSMENT
PAIN_FUNCTIONAL_ASSESSMENT: PREVENTS OR INTERFERES SOME ACTIVE ACTIVITIES AND ADLS
PAIN_FUNCTIONAL_ASSESSMENT: PREVENTS OR INTERFERES SOME ACTIVE ACTIVITIES AND ADLS

## 2023-01-30 ASSESSMENT — PAIN DESCRIPTION - PAIN TYPE
TYPE: ACUTE PAIN
TYPE: ACUTE PAIN

## 2023-01-30 NOTE — PROGRESS NOTES
IV removed. D/C instructions reviewed with patient including medications, follow up appointments, and janina drain care. Pt verbalizes understanding. All questions answered and needs met at this time.

## 2023-01-30 NOTE — DISCHARGE INSTRUCTIONS
Keep follow up appointments   Take medications as directed  No driving if taking narcotic pain medication  Call your Dr. with any questions or concerns

## 2023-01-30 NOTE — DISCHARGE SUMMARY
Physician Discharge Summary     Patient ID:  Heather Garcia  [id number]  32 y.o.  1990    Admit date: 1/25/2023    Discharge date and time: No discharge date for patient encounter. Admitting Physician: Rohith Maria DO     Discharge Physician: Qasim Mireles DO     Admission Diagnoses: Bile leak [K83.9]  Bile leak, postoperative [K91.89, K83.8]    Discharge Diagnoses: Same    Admission Condition: fair    Discharged Condition: good    Indication for Admission: ERCP    Hospital Course: Ms. Heather Maravilla presented with abdominal pain following cholecystectomy and bilious output in her AGNIESZKA that was placed at the time of surgery. She underwent ERCP and her symptoms improved. Her leukocytosis resolved. Consults: none    Significant Diagnostic Studies: see chart    Treatments: antibiotics: Zosyn; ERCP    Discharge Exam:  /68   Pulse 64   Temp 97.9 °F (36.6 °C) (Oral)   Resp 18   LMP 01/09/2023   SpO2 95%   General appearance: alert, appears stated age, cooperative, and mildly obese  Abdomen: soft, non-tender; bowel sounds normal; no masses,  no organomegaly and AGNIESZKA in place with sero-bilious output.     Disposition: home    In process/preliminary results:  Outstanding Order Results       Date and Time Order Name Status Description    1/30/2023  9:34 AM Lipase In process     1/30/2023  7:08 AM EKG 12 Lead Preliminary             Patient Instructions:   Current Discharge Medication List        CONTINUE these medications which have NOT CHANGED    Details   cephALEXin (KEFLEX) 500 MG capsule Take 1 capsule by mouth 2 times daily for 7 days  Qty: 14 capsule, Refills: 0      ibuprofen (ADVIL;MOTRIN) 800 MG tablet Take 1 tablet by mouth 3 times daily (with meals) for 5 days  Qty: 15 tablet, Refills: 0      omeprazole (PRILOSEC) 20 MG delayed release capsule Take 1 capsule by mouth every morning (before breakfast)  Qty: 90 capsule, Refills: 1      Atogepant (QULIPTA) 10 MG TABS Take 10 mg by mouth daily  Qty: 30 tablet, Refills: 0    Associated Diagnoses: Migraine without status migrainosus, not intractable, unspecified migraine type      busPIRone (BUSPAR) 15 MG tablet Take 15 mg by mouth 3 times daily      lamoTRIgine (LAMICTAL) 100 MG tablet Take 150 mg by mouth daily           STOP taking these medications       oxyCODONE (ROXICODONE) 5 MG immediate release tablet Comments:   Reason for Stopping:         methocarbamol (ROBAXIN) 500 MG tablet Comments:   Reason for Stopping:             Activity: activity as tolerated  Diet: regular diet  Wound Care: as directed    Follow-up with DR. Molina in 3 days.     Angelina Rivas DO   2/19/5057  9:45 AM

## 2023-01-30 NOTE — DISCHARGE INSTR - ACTIVITY
No heavy lifting. May shower tomorrow. Do not soak in tub. Do not drive while on pain medications. Record all Juan output. Empty daily or twice daily as needed. Bring these numbers with you to your follow-up appointment. Avoid constipation. Take colace nightly (up to 300 mg) and miralax daily (up to three doses). Drink 64 ounces of water and take a powdered fiber supplement daily (ie-Metamucil). If you have signs of infection, call you doctor.  These include:   -Increased pain, swelling, warmth, or redness  -Red streaks leading from the incision  -Pus draining from the incision  -A fever > 100.4

## 2023-01-31 ENCOUNTER — TELEPHONE (OUTPATIENT)
Dept: INTERNAL MEDICINE | Age: 33
End: 2023-01-31

## 2023-01-31 NOTE — TELEPHONE ENCOUNTER
[facility] Initial Follow Up Call    Outreach made within 2 business days of discharge: Yes    Patient: Hermelinda Oden   Patient : 1990 MRN: [id number]    Reason for Admission: Admission Diagnoses: Bile leak [K83.9]  Bile leak, postoperative [K91.89, K83.8]     Discharge Date: 23    Discharge Diagnoses: Bile leak [K83.9]  Bile leak, postoperative [K91.89, K83.8]      Spoke with: Patient    Discharge department/facility: [address] Interactive Patient Contact:  Was patient able to fill all prescriptions: Yes  Was patient instructed to bring all medications to the follow-up visit: Yes  Is patient taking all medications as directed in the discharge summary? Yes  Does patient understand their discharge instructions: Yes  Does patient have questions or concerns that need addressed prior to 7-14 day follow up office visit: no    Spoke to the patient she is doing will, she is eating some and said that she was put on Antibiotics. She is scheduled to follow up with the surgeon on Thursday.     Scheduled appointment with PCP within 7-14 days    Follow Up  Future Appointments   Date Time Provider Danisha Robledo   2912  2:18 PM DO LANA [facility]   2023  3:45 PM YINKA [facility]   3/14/2023  2:20 PM Cessinee Merrell Mortimer, APRN [facility]       [place], [place]

## 2023-02-01 ENCOUNTER — TELEPHONE (OUTPATIENT)
Dept: PRIMARY CARE CLINIC | Age: 33
End: 2023-02-01

## 2023-02-01 ENCOUNTER — TELEPHONE (OUTPATIENT)
Dept: SURGERY | Age: 33
End: 2023-02-01

## 2023-02-01 NOTE — TELEPHONE ENCOUNTER
2/1/23 Patient called and stated that one of her scripts [facility] did not have and wants to know if we can call Dicyclomine in to a different pharmacy. Please return call.     Callback # [phone]  stacie

## 2023-02-01 NOTE — TELEPHONE ENCOUNTER
----- Message from Sallyanne Saint sent at 1/30/2023 10:04 AM CST -----  Subject: Hospital Follow Up    QUESTIONS  What hospital was the Patient Discharged from? [address] [place]   Date of Discharge? 2023-01-30  Discharge Location? Home  Reason for hospitalization as patient stated? Patient was in the hospital   for her gallbladder removed and then she was having issues with her drain   and vomiting. She was vomiting and having worsening pain. Thank you. What question does the patient have, if applicable? And then when you call   the hospital you can ask for the nurse, Philly Maya. ---------------------------------------------------------------------------  --------------  Jennifer Bach INFO  What is the best way for the office to contact you? Do not leave any   message, patient will call back for answer  Preferred Call Back Phone Number? [phone]  ---------------------------------------------------------------------------  --------------  SCRIPT ANSWERS  Relationship to Patient? Third Party  Third Party Type? Hospital?   Representative Name?  Farzana Sultana

## 2023-02-02 ENCOUNTER — OFFICE VISIT (OUTPATIENT)
Dept: SURGERY | Age: 33
End: 2023-02-02

## 2023-02-02 VITALS
BODY MASS INDEX: 44.41 KG/M2 | WEIGHT: 293 LBS | HEART RATE: 86 BPM | OXYGEN SATURATION: 98 % | HEIGHT: 68 IN | TEMPERATURE: 97 F

## 2023-02-02 DIAGNOSIS — Z09 POSTOPERATIVE EXAMINATION: Primary | ICD-10-CM

## 2023-02-02 PROCEDURE — 99024 POSTOP FOLLOW-UP VISIT: CPT | Performed by: SURGERY

## 2023-02-02 NOTE — PROGRESS NOTES
Postop Progress Note    Subjective    Burgess Sin presents to the office for postop follow up 2 weeks s/p robotic cholecystectomy. Her surgery was com plicated by a gallbladder impacted with stones, and her cystic duct had to be over sewn. She developed an anticipated postoperative bile leak and underwent ERCP. She has minimal to no drain output at this time. She is tolerating her diet. She still has pain with prolonged standing. Objective    Vitals:    02/02/23 1404   Pulse: 86   Temp: 97 °F (36.1 °C)   SpO2: 98%     General: alert, cooperative and no distress  Incision: healing well. AGNIESZKA serous output. Mild ecchymosis. Assessment  Doing well postoperatively. Plan  1. Continue any current medications  2. Wound care discussed. AGNIESZKA removed. Patient tolerated well. 3. Pt is to increase activities as tolerated  4. Usual diet  5.  Follow up: as needed    Electronically signed by Helga Mendez DO on 9/5/9696 at 2:21 PM

## 2023-02-03 NOTE — ADT AUTH CERT
Utilization Reviews       Gastroenterology [address] Day 5 (1/29/2023) by Lorin Cardoza RN       Review Status Review Entered   Completed 2/2/2023 0737       Created By   Lorin Cardoza RN      Criteria Review      Care Day: 5 Care Date: 1/29/2023 Level of Care:    Guideline Day 3    Level Of Care    (X) * Activity level acceptable    2/2/2023 8:37 AM EST by Scooter Marcano      up as tolerated    (X) Floor to discharge    2/2/2023 8:37 AM EST by Scooter Marcano      med/surg    Clinical Status    (X) * Hemodynamic stability    2/2/2023 8:37 AM EST by Scooter Marcano      bp: 131/74, pulse 72    ( ) * Abdominal status acceptable    ( ) * Pain and nausea absent or adequately managed    (X) * Temperature status acceptable    2/2/2023 8:37 AM EST by Scooter Marcano      temp 98.2    (X) * Volume status acceptable for next level of care    2/2/2023 8:37 AM EST by Scooter Marcano      acceptable volume status    (X) * Oral hydration    2/2/2023 8:37 AM EST by Scooter Mracano      oral hydration    ( ) * Hepatic and biliary abnormalities absent or acceptable    ( ) * General Discharge Criteria met    Interventions    ( ) * No inpatient interventions needed    (X) * Intake acceptable    2/2/2023 8:37 AM EST by Scooter Marcano      oral hydration  regular diet       Definitions for Care Day 5    Activity level acceptable    (X) Activity level acceptable, as indicated by  1 or more  of the following :       (X) Activity at baseline       Hemodynamic stability    (X) Hemodynamic stability, as indicated by  1 or more  of the following :       (X) Patient hemodynamically stable, as indicated by  ALL  of the following  (1) (2) (3) (4) (5):          (X) Tachycardia absent          (X) Hypotension absent          (X) No evidence of inadequate perfusion (eg, no myocardial ischemia)          (X) No other hemodynamic abnormalities (eg, no Orthostatic hypotension)       Tachycardia absent    (X) Tachycardia absent, as indicated by  1 or more  of the following  (1) (2):       (X) Heart rate less than or equal to 100 beats per minute in adult or child 6 years or older       Hypotension absent    (X) Hypotension absent, as indicated by  1 or more  of the following  (1) (2) (3) (4):       (X) SBP greater than or equal to 90 mm Hg and without recent decrease greater than 40 mm Hg from       baseline in adult or child 10 years or older       Temperature status acceptable    (X) Temperature status acceptable, as indicated by  1 or more  of the following  (1) (2) (3):       (X) Temperature less than 100.5 degrees F (38.1 degrees C) (oral) and greater than 96.8 degrees       F (36 degrees C)  (rectal)       Intake acceptable    (X) Intake acceptable, as indicated by  1 or more  of the following  (1):       (X) Oral hydration, medications, and diet       * Milestone   Additional Notes   Cxr: The lungs are grossly clear         Cta pulmonary: No evidence of pulmonary emboli. Bp: 131/74, pulse 72, resp 18, temp 98.2, o2 sat 98% on ra         Tylenol 1000mg po tid   Augmentin 875-125mg po bid   Buspar 15mg po tid   Colace 100mg po daily   Lovenox 30mg sub q bid   Lamictal 150mg po daily   Robaxin 500mg po tid   Protonix 40mg po bid      Lr @ 75 cc/hr      Norco 5/325mg po q4hrs prn- had 1 dose   Dilaudid 2mg iv q3hrs prn- had 6 doses   Phenergan 12.5mg po q6hrs prn- had 1 dose            Nursing note:      Pt. C/o upper epigastric pain throughout shift however says pain is not new and hasn't gotten worse. Pt reports \"chest\" pain but points to upper stomach area and description of pain sounds like gas pains and expected pain r/t to procedures. I encouraged the use of incentive spirometer and also encouraged patient to walk several times. Subjective:   No acute events or changes. Still complaining of some chest pain. [facility] consulted.        Objective:   Abdomen: Soft, obese, ND, ATTP, incisions C/D/, AGNIESZKA with small amount of bilious output       Assessment and plan:29year old female s/o ERCP for bile leak   Continue with AGNIESZKA drain, diet as tolerated   Continue medical workup per hospitalist for possible chest pain. Likely related to stent, but need to rule out cardiac cause   Continue antibiotics   Continue lovenox and PPI        Gastroenterology [facility] - Care Day 4 (1/28/2023) by Rei Fraire RN       Review Status Review Entered   Completed 2/2/2023 0735       Created By   Rei Fraire RN      Criteria Review      Care Day: 4 Care Date: 1/28/2023 Level of Care:    Guideline Day 2    Level Of Care    (X) Floor    2/2/2023 8:35 AM EST by Debo Putt      med/surg    Clinical Status    ( ) * No ICU or intermediate care needs    Interventions    (X) Inpatient interventions continue    2/2/2023 8:35 AM EST by Debo Putt      routine vitals q4hrs  strict i&O q8hrs  regular diet  ambulate q shift    * Milestone   Additional Notes   Bp: 122/68, pulse 75, resp 18, temp 97.3, o2 sat 98% on ra         Tylenol 1000mg po tid   Augmentin 875-125mg po bid   Buspar 15mg po tid   Colace 100mg po daily   Lovenox 30mg sub q bid   Toradol 30mg iv [id number]-l/c'd   Lamictal 150mg po daily   Robaxin 500mg po tid   Protonix 40mg po bid   Zosyn 3375mg iv [id number]-t/c'd      Lr @ 75 cc/hr      Norco 5/325mg po q4hrs prn- had 3 doses   Dilaudid 1mg iv q3hrs prn- had 1 dose   Dilaudid 2mg iv q3hrs prn- had 3 doses            Subjective:   No acute events or changes. Tolerating PO. She does state that she is having some chest pain and indicates the epigastric area. Passing flatus, no BM. States she has not been out of bed yet. Objective:   Abdomen: Soft, obese, ND, ATTP, incisions C/D/I, AGNIESZKA with bilious drainage      Assessment and plan:   29year old female s/p ERCP for bile leak   Continue with AGNIESZKA   WBC has improved. Will switch to oral antibiotics   Add oral pain medication.  Will go ahead and check EKG and troponin- while I feel this is most likely related to her stent and bile leak, but will check these to make sure   Will add bowel regimen   Continue lovenox prophylaxis   Will order for patient to ambulate                 Gastroenterology [facility] - Care Day 3 (1/27/2023) by Curtis Cross RN       Review Status Review Entered   Completed 1/27/2023 1407       Created By   Curtis Cross RN      Criteria Review      Care Day: 3 Care Date: 1/27/2023 Level of Care:    Guideline Day 2    Level Of Care    (X) Floor    1/27/2023 3:07 PM EST by Hamilton Mari      med/surg    Clinical Status    ( ) * No ICU or intermediate care needs    Interventions    (X) Inpatient interventions continue    1/27/2023 3:07 PM EST by Hamilton Mari      cbc, cmp in am  regular diet  routine vitals q4hrs  strict i&o q8hrs  ambulate q shift    * Milestone   Additional Notes   Wbc 24.9, hgb 13.2, hct 41.3, na 133, glucose 129, total protein 5.9, albumin 3.3, alk phos 137, alt 40         Bp: 96/83, pulse 93, resp 16, temp 97.7, o2 sat 92% on ra         Tylenol 1000mg po tid   Buspar 15mg po tid   Lovenox 30mg sub q bid   Toradol 30mg iv q6hrs   Lamictal 150mg po daily   Robaxin 500mg po tid   Protonix 40mg po bid   Zosyn 3375mg iv q8hrs      Lr @ 125 cc/hr      Dilaudid 2mg iv q3hrs prn- hs had 4 doses today               [facility] General Surgery Progress Note      POD # 7       S:         Mrs. Hardwick is seen and examiend at bedside. She feels much improved today. She continues to have right upper abdominal pain. She is hungry and would like regular food. She would like to shower. ABDOMEN: soft, ND, appropriately TTP, +BS. Bilious drain output. Incisions: Clean, dry, and intact with no erythema or induration      A:        Active Problems:     Bile leak, postoperative     Class 3 severe obesity due to excess calories in [facility])     Right upper quadrant abdominal pain   Resolved Problems:     * No resolved hospital problems. *           P:           Analgesia prn. Hemodynamically stable. Continue zosyn for bile leak and associated leukocytosis. Elevated LFT's 2/2 bile leak. Should improve now following ERCP. Continue AGNIESZKA drain. Encourage IS usage and ambulation. Trend leukocytosis. Endoscopic Procedure Note      Date of Procedure:  1/26/2023        Procedure:    1. ERCP with stent placement    2. Intra procedure interpretation of biliary cholangiogram [id number]       Indications:    1. Post bile leak   2. Abdominal pain       Anesthesia:  General        Estimated Blood Loss: minimal      IMPRESSION:   1. Post op bile leak s/p sphincterotomy and stent placement           RECOMMENDATIONS:     1. Clear liquid diet today with advancing diet tomorrow as tolerated. 2.  Repeat ERCP in 3 months for stent removal and repeat cholangiogram   3. Timing of AGNIESZKA drain removal per Surgery Service and Dr Rashmi Brewster. 4.  Please do not hesitate to call with any questions.

## 2023-02-05 ENCOUNTER — APPOINTMENT (OUTPATIENT)
Dept: CT IMAGING | Age: 33
End: 2023-02-05
Payer: MEDICAID

## 2023-02-05 ENCOUNTER — HOSPITAL ENCOUNTER (EMERGENCY)
Age: 33
Discharge: HOME | End: 2023-02-05
Attending: EMERGENCY MEDICINE
Payer: MEDICAID

## 2023-02-05 VITALS
RESPIRATION RATE: 24 BRPM | TEMPERATURE: 98 F | DIASTOLIC BLOOD PRESSURE: 72 MMHG | HEIGHT: 68 IN | OXYGEN SATURATION: 95 % | WEIGHT: 293 LBS | BODY MASS INDEX: 44.41 KG/M2 | SYSTOLIC BLOOD PRESSURE: 130 MMHG | HEART RATE: 87 BPM

## 2023-02-05 DIAGNOSIS — K62.5 RECTAL BLEEDING: ICD-10-CM

## 2023-02-05 DIAGNOSIS — K92.2 LOWER GI BLEED: Primary | ICD-10-CM

## 2023-02-05 DIAGNOSIS — R10.30 LOWER ABDOMINAL PAIN: ICD-10-CM

## 2023-02-05 LAB
ABO/RH: NORMAL
ALBUMIN SERPL-MCNC: 3.6 G/DL (ref 3.5–5.2)
ALP BLD-CCNC: 108 U/L (ref 35–104)
ALT SERPL-CCNC: 45 U/L (ref 5–33)
ANION GAP SERPL CALCULATED.3IONS-SCNC: 7 MMOL/L (ref 7–19)
ANTIBODY SCREEN: NORMAL
AST SERPL-CCNC: 26 U/L (ref 5–32)
BACTERIA: ABNORMAL /HPF
BASOPHILS ABSOLUTE: 0.1 K/UL (ref 0–0.2)
BASOPHILS RELATIVE PERCENT: 0.7 % (ref 0–1)
BILIRUB SERPL-MCNC: <0.2 MG/DL (ref 0.2–1.2)
BILIRUBIN URINE: NEGATIVE
BLOOD, URINE: ABNORMAL
BUN BLDV-MCNC: 14 MG/DL (ref 6–20)
CALCIUM SERPL-MCNC: 9.2 MG/DL (ref 8.6–10)
CHLORIDE BLD-SCNC: 108 MMOL/L (ref 98–111)
CLARITY: CLEAR
CO2: 26 MMOL/L (ref 22–29)
COLOR: YELLOW
CREAT SERPL-MCNC: 0.6 MG/DL (ref 0.5–0.9)
CRYSTALS, UA: ABNORMAL /HPF
EOSINOPHILS ABSOLUTE: 0.3 K/UL (ref 0–0.6)
EOSINOPHILS RELATIVE PERCENT: 3.1 % (ref 0–5)
EPITHELIAL CELLS, UA: ABNORMAL /HPF
GFR SERPL CREATININE-BSD FRML MDRD: >60 ML/MIN/{1.73_M2}
GLUCOSE BLD-MCNC: 99 MG/DL (ref 74–109)
GLUCOSE URINE: NEGATIVE MG/DL
HCG(URINE) PREGNANCY TEST: NEGATIVE
HCT VFR BLD CALC: 41.6 % (ref 37–47)
HEMOGLOBIN: 12.9 G/DL (ref 12–16)
IMMATURE GRANULOCYTES #: 0 K/UL
KETONES, URINE: NEGATIVE MG/DL
LEUKOCYTE ESTERASE, URINE: ABNORMAL
LYMPHOCYTES ABSOLUTE: 2.9 K/UL (ref 1.1–4.5)
LYMPHOCYTES RELATIVE PERCENT: 26.7 % (ref 20–40)
MCH RBC QN AUTO: 27.4 PG (ref 27–31)
MCHC RBC AUTO-ENTMCNC: 31 G/DL (ref 33–37)
MCV RBC AUTO: 88.5 FL (ref 81–99)
MONOCYTES ABSOLUTE: 0.6 K/UL (ref 0–0.9)
MONOCYTES RELATIVE PERCENT: 5.5 % (ref 0–10)
NEUTROPHILS ABSOLUTE: 7 K/UL (ref 1.5–7.5)
NEUTROPHILS RELATIVE PERCENT: 63.7 % (ref 50–65)
NITRITE, URINE: NEGATIVE
PDW BLD-RTO: 14.5 % (ref 11.5–14.5)
PH UA: 5.5 (ref 5–8)
PLATELET # BLD: 502 K/UL (ref 130–400)
PMV BLD AUTO: 10.6 FL (ref 9.4–12.3)
POTASSIUM SERPL-SCNC: 4.6 MMOL/L (ref 3.5–5)
PROTEIN UA: NEGATIVE MG/DL
RBC # BLD: 4.7 M/UL (ref 4.2–5.4)
RBC UA: ABNORMAL /HPF (ref 0–2)
SODIUM BLD-SCNC: 141 MMOL/L (ref 136–145)
SPECIFIC GRAVITY UA: >=1.045 (ref 1–1.03)
TOTAL PROTEIN: 6.1 G/DL (ref 6.6–8.7)
TRICHOMONAS: PRESENT /HPF
UROBILINOGEN, URINE: 0.2 E.U./DL
WBC # BLD: 11 K/UL (ref 4.8–10.8)
WBC UA: ABNORMAL /HPF (ref 0–5)

## 2023-02-05 PROCEDURE — 6360000004 HC RX CONTRAST MEDICATION: Performed by: EMERGENCY MEDICINE

## 2023-02-05 PROCEDURE — 74177 CT ABD & PELVIS W/CONTRAST: CPT

## 2023-02-05 PROCEDURE — 86900 BLOOD TYPING SEROLOGIC ABO: CPT

## 2023-02-05 PROCEDURE — 86901 BLOOD TYPING SEROLOGIC RH(D): CPT

## 2023-02-05 PROCEDURE — 85025 COMPLETE CBC W/AUTO DIFF WBC: CPT

## 2023-02-05 PROCEDURE — 86850 RBC ANTIBODY SCREEN: CPT

## 2023-02-05 PROCEDURE — 6360000002 HC RX W HCPCS: Performed by: EMERGENCY MEDICINE

## 2023-02-05 PROCEDURE — 84703 CHORIONIC GONADOTROPIN ASSAY: CPT

## 2023-02-05 PROCEDURE — 81001 URINALYSIS AUTO W/SCOPE: CPT

## 2023-02-05 PROCEDURE — 96374 THER/PROPH/DIAG INJ IV PUSH: CPT

## 2023-02-05 PROCEDURE — 80053 COMPREHEN METABOLIC PANEL: CPT

## 2023-02-05 PROCEDURE — 36415 COLL VENOUS BLD VENIPUNCTURE: CPT

## 2023-02-05 PROCEDURE — 2580000003 HC RX 258: Performed by: EMERGENCY MEDICINE

## 2023-02-05 PROCEDURE — 99285 EMERGENCY DEPT VISIT HI MDM: CPT

## 2023-02-05 RX ORDER — 0.9 % SODIUM CHLORIDE 0.9 %
1000 INTRAVENOUS SOLUTION INTRAVENOUS ONCE
Status: COMPLETED | OUTPATIENT
Start: 2023-02-05 | End: 2023-02-05

## 2023-02-05 RX ORDER — HYDROMORPHONE HYDROCHLORIDE 1 MG/ML
0.5 INJECTION, SOLUTION INTRAMUSCULAR; INTRAVENOUS; SUBCUTANEOUS ONCE
Status: COMPLETED | OUTPATIENT
Start: 2023-02-05 | End: 2023-02-05

## 2023-02-05 RX ADMIN — HYDROMORPHONE HYDROCHLORIDE 0.5 MG: 1 INJECTION, SOLUTION INTRAMUSCULAR; INTRAVENOUS; SUBCUTANEOUS at 18:37

## 2023-02-05 RX ADMIN — SODIUM CHLORIDE 1000 ML: 9 INJECTION, SOLUTION INTRAVENOUS at 16:41

## 2023-02-05 RX ADMIN — IOPAMIDOL 95 ML: 755 INJECTION, SOLUTION INTRAVENOUS at 17:53

## 2023-02-05 ASSESSMENT — ENCOUNTER SYMPTOMS
DIARRHEA: 0
SORE THROAT: 0
SINUS PRESSURE: 0
SHORTNESS OF BREATH: 0
RHINORRHEA: 0
VOMITING: 0
NAUSEA: 0
ABDOMINAL PAIN: 0

## 2023-02-05 ASSESSMENT — PAIN - FUNCTIONAL ASSESSMENT: PAIN_FUNCTIONAL_ASSESSMENT: 0-10

## 2023-02-05 ASSESSMENT — PAIN DESCRIPTION - LOCATION: LOCATION: ABDOMEN

## 2023-02-05 ASSESSMENT — PAIN SCALES - GENERAL: PAINLEVEL_OUTOF10: 7

## 2023-02-05 NOTE — ED PROVIDER NOTES
[facility]  eMERGENCY dEPARTMENT eNCOUnter      Pt Name: Vaishnavi Ramirez  MRN: [id number]  [place] 1990  Date of evaluation: 2023  Provider: Yoana Oquendo MD    CHIEF COMPLAINT       Chief Complaint   Patient presents with    Rectal Bleeding    Post-op Problem     Pt arrives to ED via EMS with c/o rectal bleeding starting today, described as bright red with bethany-sized blood clots. Pt is s/p cholecystectomy  and stent placement . HISTORY OF PRESENT ILLNESS   (Location/Symptom, Timing/Onset,Context/Setting, Quality, Duration, Modifying Factors, Severity)  Note limiting factors. Vaishnavi Ramirez is a 29 y.o. female who presents to the emergency department painless bright red blood per rectum     HPI    Patient is a 82-year-old white female with history of morbid obesity; anxiety; reflux disease; history of alcohol abuse; recent cholecystectomy about 1 month ago with with complication of bile leak that was addressed with MR DAISY recently; who is -0-0-3 with last menstrual period 1 month ago; who presents today with bright red blood per rectum. She notes that she went back to work today and was feeling some lower quadrant abdominal discomfort; no fever. No dysuria. No nausea or vomiting. No vaginal bleeding. And what thought she had the urge to go to the bathroom when she did she passed bright red blood with clots about nickel sized. She has never had a GI bleed in the past.  She denies recent history of constipation. She denies fever; chest pain; shortness of breath. She is not on any anticoagulation. She notes she has had diarrhea stool after the cholecystectomy    NursingNotes were reviewed. REVIEW OF SYSTEMS    (2-9 systems for level 4, 10 or more for level 5)     Review of Systems   Constitutional:  Negative for chills, diaphoresis, fatigue and fever. HENT:  Negative for rhinorrhea, sinus pressure and sore throat. Eyes:  Negative for visual disturbance. Respiratory:  Negative for shortness of breath. Cardiovascular:  Negative for chest pain. Gastrointestinal:  Negative for abdominal pain, diarrhea, nausea and vomiting. Genitourinary:  Negative for difficulty urinating and dysuria. Musculoskeletal:  Negative for arthralgias and myalgias. Skin:  Negative for rash. Neurological:  Negative for weakness. Psychiatric/Behavioral:  Negative for confusion. All other systems reviewed and are negative.          PAST MEDICALHISTORY     Past Medical History:   Diagnosis Date    Anxiety     GERD (gastroesophageal reflux disease)     Headache     History of alcohol abuse     sober since 2022         SURGICAL HISTORY       Past Surgical History:   Procedure Laterality Date     SECTION      CHOLECYSTECTOMY      CHOLECYSTECTOMY, LAPAROSCOPIC N/A 2023    ROBOTIC ASSISTED LAPAROSCOPIC CHOLECYSTECTOMY WITH ICG performed by Ok Pickens DO at [facility] OR    ERCP N/A 2023    Dr CHRISTINE Quinonez-w/1 cm biliary sphincterotomy, placemetn of a 10F x 7cm plastic biliary stent-Post op bile leak, repeat in 3 months    MYRINGOTOMY [address]     Discharge Medication List as of 2023  8:36 PM        CONTINUE these medications which have NOT CHANGED    Details   amoxicillin-clavulanate (AUGMENTIN) 875-125 MG per tablet Take 1 tablet by mouth every 12 hours for 10 days, Disp-20 tablet, R-0Normal      dicyclomine (BENTYL) 10 MG capsule Take 2 capsules by mouth 4 times daily (before meals and nightly), Disp-120 capsule, R-3Normal      hyoscyamine (LEVSIN/SL) 125 MCG sublingual tablet Place 1 tablet under the tongue every 4 hours as needed for Cramping, Disp-90 tablet, R-3Normal      ibuprofen (ADVIL;MOTRIN) 800 MG tablet Take 1 tablet by mouth 3 times daily (with meals) for 5 days, Disp-15 tablet, R-0Normal      omeprazole (PRILOSEC) 20 MG delayed release capsule Take 1 capsule by mouth every morning (before breakfast), Disp-90 capsule, R-1Normal      Atogepant (QULIPTA) 10 MG TABS Take 10 mg by mouth daily, Disp-30 tablet, R-0Normal      busPIRone (BUSPAR) 15 MG tablet Take 15 mg by mouth 3 times dailyHistorical Med      lamoTRIgine (LAMICTAL) 100 MG tablet Take 150 mg by mouth dailyHistorical Med             ALLERGIES     Morphine    FAMILY HISTORY       Family History   Problem Relation Age of Onset    High Blood Pressure Mother     Heart Disease Father     High Blood Pressure Father     Cancer Paternal Grandmother     Colon Polyps Neg Hx     Colon Cancer Neg Hx           SOCIAL HISTORY       Social History     Socioeconomic History    Marital status: Single     Spouse name: None    Number of children: None    Years of education: None    Highest education level: None   Tobacco Use    Smoking status: Former     Packs/day: 0.50     Types: Cigarettes     Quit date: 2022     Years since quittin.2    Smokeless tobacco: Never   Vaping Use    Vaping Use: Every day    Substances: Nicotine, Flavoring    Devices: Refillable tank   Substance and Sexual Activity    Alcohol use: Not Currently     Comment: sober 2022    Drug use: Not Currently     Social Determinants of Health     Financial Resource Strain: Medium Risk    Difficulty of Paying Living Expenses: Somewhat hard   Food Insecurity: No Food Insecurity    Worried About Running Out of Food in the Last Year: Never true    Ran Out of Food in the Last Year: Never true   Physical Activity: Insufficiently Active    Days of Exercise per Week: 3 days    Minutes of Exercise per Session: 30 min   Intimate Partner Violence: Not At Risk    Fear of Current or Ex-Partner: No    Emotionally Abused: No    Physically Abused: No    Sexually Abused: No       SCREENINGS    [place] Coma Scale  Eye Opening: Spontaneous  Best Verbal Response: Oriented  Best Motor Response: Obeys commands  [place] Coma Scale Score: 15        PHYSICAL EXAM    (up to 7 for level 4, 8 or more for level 5)     ED Triage Vitals [02/05/23 1516]   BP Temp Temp src Heart Rate Resp SpO2 Height Weight   (!) 82/51 -- -- 76 16 95 % 5' 8\" (1.727 m) (!) 310 lb (140.6 kg)       Physical Exam  Vitals and nursing note reviewed. Constitutional:       General: She is not in acute distress. Appearance: She is well-developed. She is obese. HENT:      Head: Normocephalic and atraumatic. Nose: Nose normal.      Mouth/Throat:      Mouth: Mucous membranes are moist.   Eyes:      General:         Right eye: No discharge. Left eye: No discharge. Conjunctiva/sclera: Conjunctivae normal.      Pupils: Pupils are equal, round, and reactive to light. Cardiovascular:      Rate and Rhythm: Normal rate and regular rhythm. Heart sounds: Normal heart sounds. Pulmonary:      Effort: Pulmonary effort is normal. No respiratory distress. Breath sounds: Normal breath sounds. No wheezing or rales. Abdominal:      General: Bowel sounds are normal.      Palpations: Abdomen is soft. There is no mass. Tenderness: There is no abdominal tenderness. There is no guarding or rebound. Genitourinary:     Rectum: Normal.      Comments: No hemorrhoids or fissure palpated; no gross blood  or stool on glove but lubrication teste positive for occult blood. Exam performed with chaperone present. Musculoskeletal:         General: No tenderness. Normal range of motion. Cervical back: Normal range of motion and neck supple. Skin:     General: Skin is warm and dry. Capillary Refill: Capillary refill takes less than 2 seconds. Neurological:      Mental Status: She is alert and oriented to person, place, and time. Psychiatric:         Behavior: Behavior normal.         Thought Content:  Thought content normal.         Judgment: Judgment normal.       DIAGNOSTIC RESULTS     ERADIOLOGY:  Non-plain film images such as CT, Ultrasound and MRI are read by the radiologist. Plain radiographic images are visualized and preliminarily interpreted bythe emergency physician with the below findings:          CT ABDOMEN PELVIS W IV CONTRAST Additional Contrast? None   Final Result    1. No evidence for acute abdominal or pelvic process. 2.Post cholecystectomy and CBD stenting. No intra or extrahepatic biliary       ductal dilation. LABS:  Labs Reviewed   CBC WITH AUTO DIFFERENTIAL - Abnormal; Notable for the following components:       Result Value    WBC 11.0 (*)     MCHC 31.0 (*)     Platelets 837 (*)     All other components within normal limits   COMPREHENSIVE METABOLIC PANEL - Abnormal; Notable for the following components: Total Protein 6.1 (*)     Alkaline Phosphatase 108 (*)     ALT 45 (*)     All other components within normal limits   URINALYSIS WITH REFLEX TO CULTURE - Abnormal; Notable for the following components:    Blood, Urine TRACE (*)     Leukocyte Esterase, Urine SMALL (*)     All other components within normal limits   MICROSCOPIC URINALYSIS - Abnormal; Notable for the following components:    RBC, UA 3-5 (*)     Bacteria, UA Rare (*)     Trichomonas, UA Present (*)     Crystals, UA NEG (*)     All other components within normal limits   PREGNANCY, URINE   TYPE AND SCREEN       All other labs were within normal range or not returned as of this dictation. EMERGENCY DEPARTMENT COURSE and DIFFERENTIAL DIAGNOSIS/MDM:   Vitals:    Vitals:    02/05/23 1516 02/05/23 1646 02/05/23 1900 02/05/23 1930   BP: (!) 82/51 111/60 124/77 130/72   Pulse: 76 77 80 87   Resp: 16 24 22 24   Temp:  98 °F (36.7 °C)     TempSrc:  Oral     SpO2: 95% 95% 95% 95%   Weight: (!) 310 lb (140.6 kg)      Height: 5' 8\" (1.727 m)          MDM    Lower abdominal pain and bright red blood per rectum. Concern for GI bleeding; diverticulitis or other infection. On the exam in the emergency department. There was no blood in the global there was trace blood in the small amount of mucus that was obtained from the rectum.   I did not feel any fissure or hemorrhoid.  Patient's pain improved with treatment in the emergency department.  Bright red blood from rectum may be secondary to her frequent diarrheal stools secondary to her cholecystectomy.  Vital signs were improved with fluid and pain control.  She can follow-up as an outpatient to get a colonoscopy and do careful watchful waiting at home if she is not anemic and her vitals are stable.      CONSULTS:  None    PROCEDURES:  Unless otherwise noted below, none     Procedures    FINAL IMPRESSION      1. Lower GI bleed    2. Rectal bleeding    3. Lower abdominal pain          DISPOSITION/PLAN   DISPOSITION Decision To Discharge 02/05/2023 08:10:16 PM      PATIENT REFERRED TO:  Sallie Booker [address]  [place] [id number] [phone]    In 1 day      DISCHARGE MEDICATIONS:  Discharge Medication List as of 2/5/2023  8:36 PM             (Please note that portions of this note were completed with a voice recognition program.  Efforts were made to edit thedictations but occasionally words are mis-transcribed.)    Demetrius Godoy MD (electronically signed)  Attending Emergency Physician          Demetrius Godoy MD  02/06/23 6921

## 2023-02-06 ENCOUNTER — TELEPHONE (OUTPATIENT)
Dept: GASTROENTEROLOGY | Age: 33
End: 2023-02-06

## 2023-02-06 NOTE — TELEPHONE ENCOUNTER
EMERGENCY DEPARTMENT COURSE and DIFFERENTIAL DIAGNOSIS/MDM:   Vitals:    Vitals          Vitals:     02/05/23 1516 02/05/23 1646 02/05/23 1900 02/05/23 1930   BP: (!) 82/51 111/60 124/77 130/72   Pulse: 76 77 80 87   Resp: 16 24 22 24   Temp:   98 °F (36.7 °C)       TempSrc:   Oral       SpO2: 95% 95% 95% 95%   Weight: (!) 310 lb (140.6 kg)         Height: 5' 8\" (1.727 m)                  MDM     Lower abdominal pain and bright red blood per rectum. Concern for GI bleeding; diverticulitis or other infection. On the exam in the emergency department. There was no blood in the global there was trace blood in the small amount of mucus that was obtained from the rectum. I did not feel any fissure or hemorrhoid. Patient's pain improved with treatment in the emergency department. Bright red blood from rectum may be secondary to her frequent diarrheal stools secondary to her cholecystectomy. Vital signs were improved with fluid and pain control. She can follow-up as an outpatient to get a colonoscopy and do careful watchful waiting at home if she is not anemic and her vitals are stable. Last visit as NP CT aprn 1-12-23. ERCP/Stent per  1-26-23. Cholecystectomy done 1-20-23. ER visit yesterday per patient due to [address], see ER note above. I spoke to this patient at [phone], she said she had to report to ER yesterday for RB. The ER evaluated and did a CT Scan and some labs. Upon DC from ER they recommended she get her scope rescheduled. Patient had Egd/Cln scheduled on 1-30-23 but had to CA that date due to her GB surgery. I will forward to CT aprn to review all the above information. The patient also mentioned she is to be scheduled in future for a repeat ERCP which is still pending at present. I transferred the patient's call to TS  to get her scope rescheduled.  Amari murray

## 2023-02-06 NOTE — DISCHARGE INSTRUCTIONS
Drink plenty of fluids continue with the current medications. The bleeding may be related to abrasions on your lower gastrointestinal tract secondary to diarrhea. Could be secondary to hemorrhoids which were not seen on the CAT scan. Bright red blood is less concerning than black stool which would indicate at a higher up gastrointestinal bleed. He will require a colonoscopy to be arranged by her primary care doctor as an outpatient. Return immediately to the emergency room for any new or worsening symptoms.

## 2023-02-07 ENCOUNTER — OFFICE VISIT (OUTPATIENT)
Dept: PRIMARY CARE CLINIC | Age: 33
End: 2023-02-07

## 2023-02-07 VITALS
BODY MASS INDEX: 46.53 KG/M2 | TEMPERATURE: 97.3 F | OXYGEN SATURATION: 99 % | SYSTOLIC BLOOD PRESSURE: 126 MMHG | WEIGHT: 293 LBS | DIASTOLIC BLOOD PRESSURE: 68 MMHG | HEART RATE: 103 BPM

## 2023-02-07 DIAGNOSIS — Z87.19 HISTORY OF GI BLEED: ICD-10-CM

## 2023-02-07 DIAGNOSIS — Z09 HOSPITAL DISCHARGE FOLLOW-UP: Primary | ICD-10-CM

## 2023-02-07 DIAGNOSIS — G47.00 INSOMNIA, UNSPECIFIED TYPE: ICD-10-CM

## 2023-02-07 RX ORDER — HYDROXYZINE HYDROCHLORIDE 25 MG/1
25 TABLET, FILM COATED ORAL NIGHTLY
Qty: 30 TABLET | Refills: 0 | Status: SHIPPED | OUTPATIENT
Start: 2023-02-07 | End: 2023-03-09

## 2023-02-07 RX ORDER — IBUPROFEN 800 MG/1
TABLET ORAL
Qty: 40 TABLET | Refills: 0 | Status: SHIPPED | OUTPATIENT
Start: 2023-02-07

## 2023-02-07 SDOH — ECONOMIC STABILITY: FOOD INSECURITY: WITHIN THE PAST 12 MONTHS, YOU WORRIED THAT YOUR FOOD WOULD RUN OUT BEFORE YOU GOT MONEY TO BUY MORE.: OFTEN TRUE

## 2023-02-07 SDOH — ECONOMIC STABILITY: FOOD INSECURITY: WITHIN THE PAST 12 MONTHS, THE FOOD YOU BOUGHT JUST DIDN'T LAST AND YOU DIDN'T HAVE MONEY TO GET MORE.: SOMETIMES TRUE

## 2023-02-07 SDOH — ECONOMIC STABILITY: INCOME INSECURITY: HOW HARD IS IT FOR YOU TO PAY FOR THE VERY BASICS LIKE FOOD, HOUSING, MEDICAL CARE, AND HEATING?: HARD

## 2023-02-07 SDOH — ECONOMIC STABILITY: TRANSPORTATION INSECURITY
IN THE PAST 12 MONTHS, HAS LACK OF TRANSPORTATION KEPT YOU FROM MEETINGS, WORK, OR FROM GETTING THINGS NEEDED FOR DAILY LIVING?: YES

## 2023-02-07 SDOH — ECONOMIC STABILITY: HOUSING INSECURITY
IN THE LAST 12 MONTHS, WAS THERE A TIME WHEN YOU DID NOT HAVE A STEADY PLACE TO SLEEP OR SLEPT IN A SHELTER (INCLUDING NOW)?: YES

## 2023-02-07 NOTE — PROGRESS NOTES
Post-Discharge Transitional Care  Follow Up      Josh Miller   YOB: 1990    Date of Office Visit:  2/7/2023  Date of Hospital Admission: 2/5/23  Date of Hospital Discharge: 2/5/23  Risk of hospital readmission (high >=14%. Medium >=10%) :Readmission Risk Score: 11.1      Care management risk score Rising risk (score 2-5) and Complex Care (Scores >=6): No Risk Score On File     Non face to face  following discharge, date last encounter closed (first attempt may have been earlier): 01/31/2023    Call initiated 2 business days of discharge: Yes    ASSESSMENT/PLAN:   Hospital discharge follow-up  -     [facility] DISCHARGE MEDS RECONCILED W/ CURRENT OUTPATIENT MED LIST  Insomnia, unspecified type  -     hydrOXYzine HCl (ATARAX) 25 MG tablet; Take 1 tablet by mouth nightly, Disp-30 tablet, R-0Normal  History of GI bleed    Medical Decision Making: moderate complexity  Return in about 1 month (around 3/7/2023) for follow up with PCP. On this date 2/7/2023 I have spent 15  minutes reviewing previous notes, test results and face to face with the patient discussing the diagnosis and importance of compliance with the treatment plan as well as documenting on the day of the visit. Subjective:   HPI:  Follow up of Hospital problems/diagnosis(es): States she was passing blood clots and went back to ER and was admitted for lower GI bleed. She denies any further blood clots since last hospital admission. Colonoscopy rescheduled for March 3, 2023. She still complains of stomach pain. She is asking for prescription Ibuprofen 800mg. States she has been taking OTC Ibuprofen. She mentions that she is still having trouble sleeping. States the Melatonin is not helping and took OTC \"three sleep aides last night\" and still could not sleep. She mentions that pharmacy stated she insurance would not cover the [facility].   States she feels like she is not functioning well during the day because she is not sleeping well at night. Inpatient course: Discharge summary reviewed- see chart. Interval history/Current status: stable. Patient Active Problem List   Diagnosis    Calculus of gallbladder with chronic cholecystitis without obstruction    Post-operative pain    Post-op pain    Bile leak, postoperative    Class 3 severe obesity due to excess calories in [facility])    Right upper quadrant abdominal pain    Chest pain    History of GI bleed    Insomnia       Medications listed as ordered at the time of discharge from hospital     Medication List            Accurate as of February 7, 2023  2:28 PM. If you have any questions, ask your nurse or doctor. START taking these medications      hydrOXYzine HCl 25 MG tablet  Commonly known as: ATARAX  Take 1 tablet by mouth nightly  Started by: YINKA Day     ibuprofen 800 MG tablet  Commonly known as: ADVIL;MOTRIN  One tablet twice daily with meals as needed  Started by: YINKA Day            CONTINUE taking these medications      amoxicillin-clavulanate 875-125 MG per tablet  Commonly known as: AUGMENTIN  Take 1 tablet by mouth every 12 hours for 10 days     busPIRone 15 MG tablet  Commonly known as: BUSPAR     dicyclomine 10 MG capsule  Commonly known as: BENTYL  Take 2 capsules by mouth 4 times daily (before meals and nightly)     hyoscyamine 125 MCG sublingual tablet  Commonly known as: LEVSIN/SL  Place 1 tablet under the tongue every 4 hours as needed for Cramping     lamoTRIgine 100 MG tablet  Commonly known as: LAMICTAL     omeprazole 20 MG delayed release capsule  Commonly known as: PRILOSEC  Take 1 capsule by mouth every morning (before breakfast)            STOP taking these medications      Qulipta 10 MG Tabs  Generic drug: Atogepant  Stopped by:  YINKA Hanna               Where to Get Your Medications        These medications were sent to [address], [address] [place] [address] [address] [address] [place] [address]      Phone: [phone]   hydrOXYzine HCl 25 MG tablet  ibuprofen 800 MG tablet           Medications marked \"taking\" at this time  Outpatient Medications Marked as Taking for the 2/7/23 encounter (Office Visit) with YINKA Leone   Medication Sig Dispense Refill    hydrOXYzine HCl (ATARAX) 25 MG tablet Take 1 tablet by mouth nightly 30 tablet 0    ibuprofen (ADVIL;MOTRIN) 800 MG tablet One tablet twice daily with meals as needed 40 tablet 0    dicyclomine (BENTYL) 10 MG capsule Take 2 capsules by mouth 4 times daily (before meals and nightly) 120 capsule 3    hyoscyamine (LEVSIN/SL) 125 MCG sublingual tablet Place 1 tablet under the tongue every 4 hours as needed for Cramping 90 tablet 3    omeprazole (PRILOSEC) 20 MG delayed release capsule Take 1 capsule by mouth every morning (before breakfast) 90 capsule 1    busPIRone (BUSPAR) 15 MG tablet Take 15 mg by mouth 3 times daily      lamoTRIgine (LAMICTAL) 100 MG tablet Take 150 mg by mouth daily          Medications patient taking as of now reconciled against medications ordered at time of hospital discharge: Yes    A comprehensive review of systems was negative except for what was noted in the HPI.     Objective:    /68   Pulse (!) 103   Temp 97.3 °F (36.3 °C)   Wt (!) 306 lb (138.8 kg)   LMP 01/09/2023   SpO2 99%   BMI 46.53 kg/m²   General Appearance: alert and oriented to person, place and time, well developed and well- nourished, in no acute distress  Skin: warm and dry, no rash or erythema  Head: normocephalic and atraumatic  Eyes: pupils equal, round, and reactive to light, extraocular eye movements intact, conjunctivae normal  ENT: tympanic membrane, external ear and ear canal normal bilaterally, nose without deformity, nasal mucosa and turbinates normal without polyps  Neck: supple and non-tender without mass, no thyromegaly or thyroid nodules, no cervical lymphadenopathy  Pulmonary/Chest: clear to auscultation bilaterally- no wheezes, rales or rhonchi, normal air movement, no respiratory distress  Cardiovascular: normal rate, regular rhythm, normal S1 and S2, no murmurs, rubs, clicks, or gallops, distal pulses intact, no carotid bruits  Abdomen: surgical sites intact. soft, non-tender, non-distended, normal bowel sounds, no masses or organomegaly  Extremities: no cyanosis, clubbing or edema  Musculoskeletal: normal range of motion, no joint swelling, deformity or tenderness  Neurologic: reflexes normal and symmetric, no cranial nerve deficit, gait, coordination and speech normal      An electronic signature was used to authenticate this note.   --Miguel Meyer, APRN

## 2023-03-06 DIAGNOSIS — G47.00 INSOMNIA, UNSPECIFIED TYPE: ICD-10-CM

## 2023-03-08 RX ORDER — HYDROXYZINE HYDROCHLORIDE 25 MG/1
TABLET, FILM COATED ORAL
Qty: 30 TABLET | Refills: 0 | Status: SHIPPED | OUTPATIENT
Start: 2023-03-08

## 2023-03-08 NOTE — TELEPHONE ENCOUNTER
Oliver Ponce called to request a refill on her medication.       Last office visit : 2/7/2023   Next office visit : 3/7/2023     Requested Prescriptions     Pending Prescriptions Disp Refills    hydrOXYzine HCl (ATARAX) 25 MG tablet [Pharmacy Med Name: HYDROXYZINE HCL 25MG TABS (WHITE)] 30 tablet 0     Sig: TAKE 1 TABLET BY MOUTH EVERY NIGHT            Rocio Alejandro LPN

## 2023-03-13 ENCOUNTER — OFFICE VISIT (OUTPATIENT)
Dept: PRIMARY CARE CLINIC | Age: 33
End: 2023-03-13

## 2023-03-13 VITALS
SYSTOLIC BLOOD PRESSURE: 136 MMHG | WEIGHT: 293 LBS | TEMPERATURE: 97.1 F | OXYGEN SATURATION: 100 % | HEART RATE: 87 BPM | DIASTOLIC BLOOD PRESSURE: 82 MMHG | BODY MASS INDEX: 47.9 KG/M2

## 2023-03-13 DIAGNOSIS — E66.01 CLASS 3 SEVERE OBESITY DUE TO EXCESS CALORIES WITHOUT SERIOUS COMORBIDITY WITH BODY MASS INDEX (BMI) OF 45.0 TO 49.9 IN ADULT (HCC): Primary | ICD-10-CM

## 2023-03-13 DIAGNOSIS — G47.00 INSOMNIA, UNSPECIFIED TYPE: ICD-10-CM

## 2023-03-13 RX ORDER — ORAL SEMAGLUTIDE 3 MG/1
3 TABLET ORAL DAILY
Qty: 30 TABLET | Refills: 0 | Status: SHIPPED | OUTPATIENT
Start: 2023-03-13 | End: 2023-03-14 | Stop reason: ALTCHOICE

## 2023-03-13 ASSESSMENT — ENCOUNTER SYMPTOMS
EYES NEGATIVE: 1
RESPIRATORY NEGATIVE: 1
ALLERGIC/IMMUNOLOGIC NEGATIVE: 1
GASTROINTESTINAL NEGATIVE: 1

## 2023-03-13 NOTE — PROGRESS NOTES
[facility] [address] [facility]  [address] [address] [place] [address]  Dept: [phone]  Dept Fax: [phone]  Loc: [phone]    Kanu Rico is a 29 y.o. female who presents today for her medical conditions/complaints as noted below. Kanu Rico is c/o of Follow-up        HPI:   She has concerns about her weight. She is wanting to start a pill for weight loss. Does not want injections. She is trying to eat less fried foods and baking. She admits to eating a lot of bread. She states she may drink one soda a day. She reports drinking more Cran-juices    She reports the stent that was placed, she feels like her food \"is getting hung up and not going on\".   Appointment Gastroenterology  HPI   Chief Complaint   Patient presents with    Follow-up     Past Medical History:   Diagnosis Date    Anxiety     GERD (gastroesophageal reflux disease)     Headache     History of alcohol abuse     sober since 2022      Past Surgical History:   Procedure Laterality Date     SECTION      CHOLECYSTECTOMY      CHOLECYSTECTOMY, LAPAROSCOPIC N/A 2023    ROBOTIC ASSISTED LAPAROSCOPIC CHOLECYSTECTOMY WITH ICG performed by Ranjith Weinstein DO at [facility] OR    ERCP N/A 2023    Dr CHRISTINE Quinonez-w/1 cm biliary sphincterotomy, placemetn of a 10F x 7cm plastic biliary stent-Post op bile leak, repeat in 3 months    MYRINGOTOMY W/ TUBES      TUBAL LIGATION         Vitals 3/13/2023 2023 2023 2023 2023    SYSTOLIC 798 807 603 936 027 82   DIASTOLIC 82 68 72 77 60 51   Pulse 87 103 87 80 77 76   Temp 97.1 97.3 - - 98 -   Resp - - 24 22 24 16   SpO2 100 99 95 95 95 95   Weight 315 lb 306 lb - - - 310 lb   Height - - - - - 5' 8\"   Body mass index - - - - - 47.13 kg/m2   Pain Level - - - - - 7   Some recent data might be hidden       Family History   Problem Relation Age of Onset    High Blood Pressure Mother     Heart Disease Father     High Blood Pressure Father     Cancer Paternal Grandmother     Colon Polyps Neg Hx     Colon Cancer Neg Hx        Social History     Tobacco Use    Smoking status: Former     Packs/day: 0.50     Types: Cigarettes     Quit date: 2022     Years since quittin.3    Smokeless tobacco: Never   Substance Use Topics    Alcohol use: Not Currently     Comment: sober 2022      Current Outpatient Medications on File Prior to Visit   Medication Sig Dispense Refill    hydrOXYzine HCl (ATARAX) 25 MG tablet TAKE 1 TABLET BY MOUTH EVERY NIGHT 30 tablet 0    ibuprofen (ADVIL;MOTRIN) 800 MG tablet One tablet twice daily with meals as needed 40 tablet 0    dicyclomine (BENTYL) 10 MG capsule Take 2 capsules by mouth 4 times daily (before meals and nightly) 120 capsule 3    hyoscyamine (LEVSIN/SL) 125 MCG sublingual tablet Place 1 tablet under the tongue every 4 hours as needed for Cramping 90 tablet 3    omeprazole (PRILOSEC) 20 MG delayed release capsule Take 1 capsule by mouth every morning (before breakfast) 90 capsule 1    busPIRone (BUSPAR) 15 MG tablet Take 15 mg by mouth 3 times daily      lamoTRIgine (LAMICTAL) 100 MG tablet Take 150 mg by mouth daily       No current facility-administered medications on file prior to visit.      Allergies   Allergen Reactions    Morphine Other (See Comments)     Pt states it makes her feel like her body is on fire        Health Maintenance   Topic Date Due    Varicella vaccine (1 of 2 - 2-dose childhood series) Never done    HIV screen  Never done    Hepatitis C screen  Never done    Cervical cancer screen  Never done    Flu vaccine (1) Never done    COVID-19 Vaccine (1) 2024 (Originally 1990)    Depression Screen  2024    DTaP/Tdap/Td vaccine (3 - Td or Tdap) 2028    Hepatitis A vaccine  Aged Out    Hib vaccine  Aged Out    Meningococcal (ACWY) vaccine  Aged Out    Pneumococcal 0-64 years Vaccine  Aged Out       Subjective   SUBJECTIVE/OBJECTIVE:  @HPI@    Review of Systems Constitutional:  Positive for unexpected weight change (weight gain). HENT: Negative. Eyes: Negative. Respiratory: Negative. Cardiovascular: Negative. Gastrointestinal: Negative. Endocrine: Negative. Genitourinary: Negative. Musculoskeletal: Negative. Skin: Negative. Allergic/Immunologic: Negative. Neurological: Negative. Hematological: Negative. Psychiatric/Behavioral:  Positive for sleep disturbance (improve). Objective   Physical Exam  Vitals and nursing note reviewed. Constitutional:       Appearance: Normal appearance. She is obese. HENT:      Head: Normocephalic. Nose: Nose normal.   Cardiovascular:      Rate and Rhythm: Normal rate and regular rhythm. Pulses: Normal pulses. Heart sounds: Normal heart sounds. Pulmonary:      Effort: Pulmonary effort is normal.      Breath sounds: Normal breath sounds. No wheezing or rhonchi. Abdominal:      General: Abdomen is flat. Bowel sounds are normal.      Palpations: Abdomen is soft. Tenderness: There is no abdominal tenderness. Musculoskeletal:         General: Normal range of motion. Cervical back: Normal range of motion. Skin:     General: Skin is warm and dry. Neurological:      Mental Status: She is alert and oriented to person, place, and time. Psychiatric:         Mood and Affect: Mood normal.         Behavior: Behavior normal.          ASSESSMENT/PLAN:  1. Class 3 severe obesity due to excess calories without serious comorbidity with body mass index (BMI) of 45.0 to 49.9 in [facility])  2. Insomnia, unspecified type    Return in about 4 weeks (around 4/10/2023). More than 50% of the time was spent counseling and coordinating care for a total time of 20-25min face to face. An additional 15 minutes spent discussing decreasing carbs and sugars and doing exercise to help with weight loss. Patient information given to the patient.   Continue Hydroxyzine at bedtime  Start Rybelsus 3mg daily  Follow up in one month  PDMP Monitoring:    Last PDMP [place] as Reviewed:  Review User Review Instant Review Result            Urine Drug Screenings (1 yr)    No resulted procedures found. Medication Contract and Consent for Opioid Use Documents Filed        No documents found                     Patient given educational materials -see patient instructions. Discussed use, benefit, and side effects of prescribed medications. All patient questions answered. Pt voiced understanding. Reviewed health maintenance. Instructed to continue currentmedications, diet and exercise. Patient agreed with treatment plan. Follow up as directed. MEDICATIONS:  Orders Placed This Encounter   Medications    Semaglutide (RYBELSUS) 3 MG TABS     Sig: Take 3 mg by mouth daily     Dispense:  30 tablet     Refill:  0           ORDERS:  No orders of the defined types were placed in this encounter. Follow-up:  Return in about 4 weeks (around 4/10/2023). PATIENT INSTRUCTIONS:  There are no Patient Instructions on file for this visit. Electronically signed by YINKA Alves on 3/13/2023 at 11:10 AM    EMR Dragon/transcription disclaimer:  Much of thisencounter note is electronic transcription/translation of spoken language to printed texts. The electronic translation of spoken language may be erroneous, or at times, nonsensical words or phrases may be inadvertentlytranscribed.   Although I have reviewed the note for such errors, some may still exist.

## 2023-03-14 ENCOUNTER — OFFICE VISIT (OUTPATIENT)
Dept: GASTROENTEROLOGY | Age: 33
End: 2023-03-14
Payer: MEDICAID

## 2023-03-14 VITALS
DIASTOLIC BLOOD PRESSURE: 80 MMHG | HEIGHT: 68 IN | WEIGHT: 293 LBS | HEART RATE: 96 BPM | SYSTOLIC BLOOD PRESSURE: 100 MMHG | OXYGEN SATURATION: 98 % | BODY MASS INDEX: 44.41 KG/M2

## 2023-03-14 DIAGNOSIS — K62.5 RECTAL BLEEDING: ICD-10-CM

## 2023-03-14 DIAGNOSIS — R19.7 DIARRHEA, UNSPECIFIED TYPE: ICD-10-CM

## 2023-03-14 DIAGNOSIS — K21.9 GASTROESOPHAGEAL REFLUX DISEASE, UNSPECIFIED WHETHER ESOPHAGITIS PRESENT: Primary | ICD-10-CM

## 2023-03-14 DIAGNOSIS — R13.10 DYSPHAGIA, UNSPECIFIED TYPE: ICD-10-CM

## 2023-03-14 PROCEDURE — 99214 OFFICE O/P EST MOD 30 MIN: CPT | Performed by: NURSE PRACTITIONER

## 2023-03-14 ASSESSMENT — ENCOUNTER SYMPTOMS
TROUBLE SWALLOWING: 1
BLOOD IN STOOL: 1
CHOKING: 0
SHORTNESS OF BREATH: 0
COUGH: 0
DIARRHEA: 0
NAUSEA: 0
RECTAL PAIN: 0
ABDOMINAL PAIN: 0
CONSTIPATION: 0
ANAL BLEEDING: 0
ABDOMINAL DISTENTION: 0
VOMITING: 0

## 2023-03-14 NOTE — PROGRESS NOTES
Subjective:     Patient ID: Lady Meza is a 29 y.o. female  PCP: YINKA Donald  Referring Provider: No ref. provider found    HPI  Patient presents to the office today with the following complaints: No chief complaint on file. Patient seen in the office today for follow up on rectal bleeding,  GERD and dysphagia. She was not able to have her recently scheduled EGD and CLN due to having cholecystectomy. Reports she continues to have bright red rectal bleeding with bowel movements and reports her bowel movements are loose since her gallbladder was removed. She currently has a biliary duct stent in place that is due to be removed April 26,2023. Assessment:     1. Gastroesophageal reflux disease, unspecified whether esophagitis present  2. Dysphagia, unspecified type  3. Rectal bleeding         Plan:   Schedule Colonoscopy and EGD  Instruct on bowel prep. Nothing to eat or drink after midnight the day of the exam.  Unable to drive for 24 hours after the procedure. No aspirin or nonsteroidal anti-inflammatories for 5 days before procedure. I have discussed the benefits, alternatives, and risks (including bleeding, perforation and death)  for pursuing Endoscopy (EGD/Colonscopy/EUS/ERCP) with the patient and they are willing to continue. We also discussed the need for anesthesia, IV access, proper dietary changes, medication changes if necessary, and need for bowel prep (if ordered) prior to their Endoscopic procedure. They are aware they must have someone accompany them to their scheduled procedure to drive them home - they agree to the above and are willing to continue. Orders  No orders of the defined types were placed in this encounter. Medications  No orders of the defined types were placed in this encounter.         Patient History:     Past Medical History:   Diagnosis Date    Anxiety     GERD (gastroesophageal reflux disease)     Headache     History of alcohol abuse     sober since 2022       Past Surgical History:   Procedure Laterality Date     SECTION      CHOLECYSTECTOMY      CHOLECYSTECTOMY, LAPAROSCOPIC N/A 2023    ROBOTIC ASSISTED LAPAROSCOPIC CHOLECYSTECTOMY WITH ICG performed by Seven Ball DO at [facility] OR    ERCP N/A 2023    Dr CHRISTINE Quinonez-w/1 cm biliary sphincterotomy, placemetn of a 10F x 7cm plastic biliary stent-Post op bile leak, repeat in 3 months    MYRINGOTOMY W/ TUBES      TUBAL LIGATION         Family History   Problem Relation Age of Onset    High Blood Pressure Mother     Heart Disease Father     High Blood Pressure Father     Cancer Paternal Grandmother         pancreatic    Colon Polyps Neg Hx     Colon Cancer Neg Hx     Esophageal Cancer Neg Hx     Liver Cancer Neg Hx     Liver Disease Neg Hx     Rectal Cancer Neg Hx     Stomach Cancer Neg Hx        Social History     Socioeconomic History    Marital status: Single     Spouse name: None    Number of children: None    Years of education: None    Highest education level: None   Tobacco Use    Smoking status: Former     Packs/day: 0.50     Types: Cigarettes     Quit date: 2022     Years since quittin.3    Smokeless tobacco: Never   Vaping Use    Vaping Use: Every day    Substances: Nicotine, Flavoring    Devices: Refillable tank   Substance and Sexual Activity    Alcohol use: Not Currently     Comment: sober 2022    Drug use: Not Currently     Social Determinants of Health     Financial Resource Strain: Medium Risk    Difficulty of Paying Living Expenses: Somewhat hard   Food Insecurity: No Food Insecurity    Worried About Running Out of Food in the Last Year: Never true    Ran Out of Food in the Last Year: Never true   Physical Activity: Insufficiently Active    Days of Exercise per Week: 3 days    Minutes of Exercise per Session: 30 min   Intimate Partner Violence: Not At Risk    Fear of Current or Ex-Partner: No    Emotionally Abused: No    Physically Abused: No    Sexually Abused: No       Current Outpatient Medications   Medication Sig Dispense Refill    hydrOXYzine HCl (ATARAX) 25 MG tablet TAKE 1 TABLET BY MOUTH EVERY NIGHT 30 tablet 0    ibuprofen (ADVIL;MOTRIN) 800 MG tablet One tablet twice daily with meals as needed 40 tablet 0    hyoscyamine (LEVSIN/SL) 125 MCG sublingual tablet Place 1 tablet under the tongue every 4 hours as needed for Cramping 90 tablet 3    omeprazole (PRILOSEC) 20 MG delayed release capsule Take 1 capsule by mouth every morning (before breakfast) 90 capsule 1    busPIRone (BUSPAR) 15 MG tablet Take 15 mg by mouth 3 times daily      lamoTRIgine (LAMICTAL) 100 MG tablet Take 150 mg by mouth daily      dicyclomine (BENTYL) 10 MG capsule Take 2 capsules by mouth 4 times daily (before meals and nightly) (Patient not taking: Reported on 3/14/2023) 120 capsule 3     No current facility-administered medications for this visit. Allergies   Allergen Reactions    Morphine Other (See Comments)     Pt states it makes her feel like her body is on fire        Review of Systems   Constitutional:  Negative for activity change, appetite change, fatigue, fever and unexpected weight change. HENT:  Positive for trouble swallowing. Respiratory:  Negative for cough, choking and shortness of breath. Cardiovascular:  Negative for chest pain. Gastrointestinal:  Positive for blood in stool. Negative for abdominal distention, abdominal pain, anal bleeding, constipation, diarrhea, nausea, rectal pain and vomiting. Allergic/Immunologic: Negative for food allergies. All other systems reviewed and are negative. Objective:     /80 (Site: Left Upper Arm, Position: Sitting, Cuff Size: Medium Adult)   Pulse 96   Ht 5' 8\" (1.727 m)   Wt (!) 308 lb (139.7 kg)   SpO2 98%   BMI 46.83 kg/m²     Physical Exam  Vitals reviewed. Constitutional:       General: She is not in acute distress. Appearance: She is well-developed.    HENT:      Head: Normocephalic and atraumatic. Right Ear: External ear normal.      Left Ear: External ear normal.      Nose: Nose normal.   Eyes:      General: No scleral icterus. Right eye: No discharge. Left eye: No discharge. Conjunctiva/sclera: Conjunctivae normal.      Pupils: Pupils are equal, round, and reactive to light. Cardiovascular:      Rate and Rhythm: Normal rate and regular rhythm. Heart sounds: Normal heart sounds. No murmur heard. Pulmonary:      Effort: Pulmonary effort is normal. No respiratory distress. Breath sounds: Normal breath sounds. No wheezing or rales. Abdominal:      General: Bowel sounds are normal. There is no distension. Palpations: Abdomen is soft. There is no mass. Tenderness: There is no abdominal tenderness. There is no guarding or rebound. Musculoskeletal:         General: Normal range of motion. Cervical back: Normal range of motion and neck supple. Skin:     General: Skin is warm and dry. Coloration: Skin is not pale. Neurological:      Mental Status: She is alert and oriented to person, place, and time.    Psychiatric:         Behavior: Behavior normal.

## 2023-04-05 DIAGNOSIS — G47.00 INSOMNIA, UNSPECIFIED TYPE: ICD-10-CM

## 2023-04-05 RX ORDER — HYDROXYZINE HYDROCHLORIDE 25 MG/1
TABLET, FILM COATED ORAL
Qty: 30 TABLET | Refills: 0 | Status: SHIPPED | OUTPATIENT
Start: 2023-04-05

## 2023-04-24 ENCOUNTER — ANESTHESIA EVENT (OUTPATIENT)
Dept: ENDOSCOPY | Age: 33
End: 2023-04-24
Payer: MEDICAID

## 2023-04-24 RX ORDER — OMEPRAZOLE 20 MG/1
20 CAPSULE, DELAYED RELEASE ORAL
Qty: 90 CAPSULE | Refills: 1 | Status: SHIPPED | OUTPATIENT
Start: 2023-04-24

## 2023-04-24 NOTE — TELEPHONE ENCOUNTER
Patient asking for refills. Last ov 3/14/23:     1. Gastroesophageal reflux disease, unspecified whether esophagitis present  2. Dysphagia, unspecified type  3.  Rectal bleeding     No follow up scheduled, but scheduled for ERCP 4/26/23 with Dr Elizabet Coley for stent removal.

## 2023-04-26 ENCOUNTER — APPOINTMENT (OUTPATIENT)
Dept: GENERAL RADIOLOGY | Age: 33
End: 2023-04-26
Attending: INTERNAL MEDICINE
Payer: MEDICAID

## 2023-04-26 ENCOUNTER — HOSPITAL ENCOUNTER (OUTPATIENT)
Age: 33
Setting detail: OUTPATIENT SURGERY
Discharge: HOME | End: 2023-04-26
Attending: INTERNAL MEDICINE | Admitting: INTERNAL MEDICINE
Payer: MEDICAID

## 2023-04-26 ENCOUNTER — ANESTHESIA (OUTPATIENT)
Dept: ENDOSCOPY | Age: 33
End: 2023-04-26
Payer: MEDICAID

## 2023-04-26 VITALS
HEIGHT: 68 IN | TEMPERATURE: 97.8 F | WEIGHT: 293 LBS | SYSTOLIC BLOOD PRESSURE: 145 MMHG | DIASTOLIC BLOOD PRESSURE: 94 MMHG | OXYGEN SATURATION: 100 % | BODY MASS INDEX: 44.41 KG/M2 | RESPIRATION RATE: 18 BRPM | HEART RATE: 65 BPM

## 2023-04-26 LAB — HCG UR QL: NEGATIVE

## 2023-04-26 PROCEDURE — 3209999900 FLUORO FOR SURGICAL PROCEDURES

## 2023-04-26 PROCEDURE — 84703 CHORIONIC GONADOTROPIN ASSAY: CPT

## 2023-04-26 PROCEDURE — 43264 ERCP REMOVE DUCT CALCULI: CPT | Performed by: INTERNAL MEDICINE

## 2023-04-26 PROCEDURE — 43275 ERCP REMOVE FORGN BODY DUCT: CPT | Performed by: INTERNAL MEDICINE

## 2023-04-26 PROCEDURE — 3700000000 HC ANESTHESIA ATTENDED CARE: Performed by: INTERNAL MEDICINE

## 2023-04-26 PROCEDURE — 7100000010 HC PHASE II RECOVERY - FIRST 15 MIN: Performed by: INTERNAL MEDICINE

## 2023-04-26 PROCEDURE — 3609015000 HC ERCP REMOVE FOREIGN BODY/STENT BILIARY/PANC DUCT: Performed by: INTERNAL MEDICINE

## 2023-04-26 PROCEDURE — 3700000001 HC ADD 15 MINUTES (ANESTHESIA): Performed by: INTERNAL MEDICINE

## 2023-04-26 PROCEDURE — 6360000002 HC RX W HCPCS: Performed by: NURSE ANESTHETIST, CERTIFIED REGISTERED

## 2023-04-26 PROCEDURE — 2580000003 HC RX 258: Performed by: INTERNAL MEDICINE

## 2023-04-26 PROCEDURE — 2500000003 HC RX 250 WO HCPCS: Performed by: NURSE ANESTHETIST, CERTIFIED REGISTERED

## 2023-04-26 PROCEDURE — 6370000000 HC RX 637 (ALT 250 FOR IP): Performed by: INTERNAL MEDICINE

## 2023-04-26 PROCEDURE — 74328 X-RAY BILE DUCT ENDOSCOPY: CPT | Performed by: INTERNAL MEDICINE

## 2023-04-26 PROCEDURE — 7100000011 HC PHASE II RECOVERY - ADDTL 15 MIN: Performed by: INTERNAL MEDICINE

## 2023-04-26 PROCEDURE — 2720000010 HC SURG SUPPLY STERILE: Performed by: INTERNAL MEDICINE

## 2023-04-26 PROCEDURE — 74328 X-RAY BILE DUCT ENDOSCOPY: CPT

## 2023-04-26 PROCEDURE — C1769 GUIDE WIRE: HCPCS | Performed by: INTERNAL MEDICINE

## 2023-04-26 PROCEDURE — 2709999900 HC NON-CHARGEABLE SUPPLY: Performed by: INTERNAL MEDICINE

## 2023-04-26 RX ORDER — PROPOFOL 10 MG/ML
INJECTION, EMULSION INTRAVENOUS CONTINUOUS PRN
Status: DISCONTINUED | OUTPATIENT
Start: 2023-04-26 | End: 2023-04-26 | Stop reason: SDUPTHER

## 2023-04-26 RX ORDER — FENTANYL CITRATE 50 UG/ML
INJECTION, SOLUTION INTRAMUSCULAR; INTRAVENOUS PRN
Status: DISCONTINUED | OUTPATIENT
Start: 2023-04-26 | End: 2023-04-26 | Stop reason: SDUPTHER

## 2023-04-26 RX ORDER — MIDAZOLAM HYDROCHLORIDE 1 MG/ML
INJECTION INTRAMUSCULAR; INTRAVENOUS PRN
Status: DISCONTINUED | OUTPATIENT
Start: 2023-04-26 | End: 2023-04-26 | Stop reason: SDUPTHER

## 2023-04-26 RX ORDER — ONDANSETRON 2 MG/ML
INJECTION INTRAMUSCULAR; INTRAVENOUS PRN
Status: DISCONTINUED | OUTPATIENT
Start: 2023-04-26 | End: 2023-04-26 | Stop reason: SDUPTHER

## 2023-04-26 RX ORDER — LIDOCAINE HYDROCHLORIDE 10 MG/ML
INJECTION, SOLUTION INFILTRATION; PERINEURAL PRN
Status: DISCONTINUED | OUTPATIENT
Start: 2023-04-26 | End: 2023-04-26 | Stop reason: SDUPTHER

## 2023-04-26 RX ORDER — SODIUM CHLORIDE, SODIUM LACTATE, POTASSIUM CHLORIDE, CALCIUM CHLORIDE 600; 310; 30; 20 MG/100ML; MG/100ML; MG/100ML; MG/100ML
INJECTION, SOLUTION INTRAVENOUS CONTINUOUS
Status: DISCONTINUED | OUTPATIENT
Start: 2023-04-26 | End: 2023-04-26 | Stop reason: HOSPADM

## 2023-04-26 RX ADMIN — ONDANSETRON 4 MG: 2 INJECTION INTRAMUSCULAR; INTRAVENOUS at 12:02

## 2023-04-26 RX ADMIN — LIDOCAINE HYDROCHLORIDE: 20 SOLUTION ORAL; TOPICAL at 12:46

## 2023-04-26 RX ADMIN — MIDAZOLAM 2 MG: 1 INJECTION INTRAMUSCULAR; INTRAVENOUS at 12:00

## 2023-04-26 RX ADMIN — SODIUM CHLORIDE, POTASSIUM CHLORIDE, SODIUM LACTATE AND CALCIUM CHLORIDE: 600; 310; 30; 20 INJECTION, SOLUTION INTRAVENOUS at 11:04

## 2023-04-26 RX ADMIN — PROPOFOL 120 MCG/KG/MIN: 10 INJECTION, EMULSION INTRAVENOUS at 12:01

## 2023-04-26 RX ADMIN — LIDOCAINE HYDROCHLORIDE 40 MG: 10 INJECTION, SOLUTION INFILTRATION; PERINEURAL at 12:01

## 2023-04-26 RX ADMIN — FENTANYL CITRATE 100 MCG: 50 INJECTION INTRAMUSCULAR; INTRAVENOUS at 12:01

## 2023-04-26 ASSESSMENT — PAIN DESCRIPTION - LOCATION
LOCATION: OTHER (COMMENT)
LOCATION: OTHER (COMMENT)
LOCATION: CHEST;ABDOMEN

## 2023-04-26 ASSESSMENT — PAIN SCALES - GENERAL
PAINLEVEL_OUTOF10: 0
PAINLEVEL_OUTOF10: 7
PAINLEVEL_OUTOF10: 8
PAINLEVEL_OUTOF10: 7
PAINLEVEL_OUTOF10: 8

## 2023-04-26 ASSESSMENT — PAIN - FUNCTIONAL ASSESSMENT: PAIN_FUNCTIONAL_ASSESSMENT: 0-10

## 2023-04-26 ASSESSMENT — PAIN DESCRIPTION - DESCRIPTORS
DESCRIPTORS: ACHING
DESCRIPTORS: DISCOMFORT

## 2023-04-26 ASSESSMENT — PAIN SCALES - WONG BAKER: WONGBAKER_NUMERICALRESPONSE: 2

## 2023-04-26 ASSESSMENT — PAIN DESCRIPTION - PAIN TYPE: TYPE: SURGICAL PAIN

## 2023-04-26 NOTE — OP NOTE
Endoscopic Procedure Note    Patient: Reinaldo Mike : 1990  Med Rec#: [id number] Acc#: [id number]     Primary Care Provider YINKA Wooten  Referring Provider: Shonda Elias DO    Endoscopist: Alex Fitzpatrick MD    Date of Procedure:  2023     Procedure:   1. ERCP with stent removal  2. ERCP with stone/sludge removal  3. Intra procedure interpretation of biliary cholangiogram [id number]    Indications:   1. Recent ERCP with stone removal   2. S/p cholecystectomy    Anesthesia:  TIVA     Estimated Blood Loss: minimal    Procedure:   Prior to the procedure the patient's chart was reviewed and informed consent was obtained. Risk and Benefits (Risks including but not limited to bleeding, perforation, infection, 8 to 10% risk of post ERCP pancreatitis, and even death) were discussed with the patient. They were agreeable to continue. Patient was brought to the operating room, underwent general anesthesia, and placed in the prone position. A side-viewing duodenoscope was advanced from the oropharynx down to the distal duodenum and reduced into a short position opposite the ampulla. The ampulla appeared c/w sphincterotomy and previous stent placement. A  film was obtained which showed stent in proper position. Stent removal:  An existing stent was seen emanating from the ampulla. This was grasped and removed with a polypectomy snare and removed in its entirety through the scope. The bile duct was then cannulated using a 0.035 x 260 cm straight Dreamwire. A short nose traction sphincterotome was advanced over the wire and the bile duct was deeply cannulated. Contrast was injected. I personally interpreted the bile duct images. The flow of contrast was adequate. Contrast injection showed filling defect in the distal CBD. The pancreatic duct was not cannulated nor injected. The bile duct was swept multiple times starting the bifurcation with a 12mm biliary extraction balloon.  A small stones

## 2023-04-26 NOTE — ANESTHESIA POSTPROCEDURE EVALUATION
Department of Anesthesiology  Postprocedure Note    Patient: Adams Gardner  MRN: [id number]  YOB: 1990  Date of evaluation: 2023      Procedure Summary     Date: 23 Room / Location: [address]    Anesthesia Start: 6765 Anesthesia Stop:     Procedure: ERCP STENT REMOVAL Diagnosis:       Encounter for removal of biliary stent      (Encounter for removal of biliary stent [Z46.89])    Surgeons: Yovani Osborn MD Responsible Provider: Rudean Apgar, APRN - CRNA    Anesthesia Type: general ASA Status: 3          Anesthesia Type: No value filed.     Sravani Phase I:      Sravani Phase II:        Anesthesia Post Evaluation    Patient location during evaluation: bedside  Patient participation: complete - patient participated  Level of consciousness: sleepy but conscious  Pain score: 0  Airway patency: patent  Nausea & Vomiting: no nausea and no vomiting  Complications: no  Cardiovascular status: hemodynamically stable and blood pressure returned to baseline  Respiratory status: acceptable and nasal cannula  Hydration status: stable

## 2023-04-26 NOTE — ANESTHESIA PRE PROCEDURE
Department of Anesthesiology  Preprocedure Note       Name:  Juwan Reason   Age:  29 y.o.  :  1990                                          MRN:  [id number]         Date:  2023      Surgeon: Kristian White):  Tigist Terry MD    Procedure: Procedure(s):  ERCP STENT REMOVAL    Medications prior to admission:   Prior to Admission medications    Medication Sig Start Date End Date Taking? Authorizing Provider   omeprazole (PRILOSEC) 20 MG delayed release capsule Take 1 capsule by mouth every morning (before breakfast) 23   YINKA Herndon   hydrOXYzine HCl (ATARAX) 25 MG tablet TAKE 1 TABLET BY MOUTH EVERY NIGHT 23   Sallie L YINKA Booker   ibuprofen (ADVIL;MOTRIN) 800 MG tablet One tablet twice daily with meals as needed 23   Estcaroline Daniel YINKA Troy   dicyclomine (BENTYL) 10 MG capsule Take 2 capsules by mouth 4 times daily (before meals and nightly)  Patient not taking: Reported on    Genevieve Molina DO   hyoscyamine (LEVSIN/SL) 125 MCG sublingual tablet Place 1 tablet under the tongue every 4 hours as needed for Cramping  Patient not taking: Reported on 85   Genevieve Molina DO   busPIRone (BUSPAR) 15 MG tablet Take 15 mg by mouth 3 times daily 12/15/22   Historical Provider, MD   lamoTRIgine (LAMICTAL) 100 MG tablet Take 1.5 tablets by mouth daily 12/15/22   Historical Provider, MD       Current medications:    No current facility-administered medications for this visit. No current outpatient medications on file. Facility-Administered Medications Ordered in Other Visits   Medication Dose Route Frequency Provider Last Rate Last Admin    indomethacin (INDOCIN) 50 MG suppository 100 mg  100 mg Rectal Once Tigist Terry MD        lactated ringers IV soln infusion   IntraVENous Continuous Tigist Terry  mL/hr at 23 1104 New Bag at 23 1104       Allergies:     Allergies   Allergen Reactions    Morphine Other (See Comments)     Pt states it

## 2023-04-26 NOTE — H&P
Patient Name: Oswaldo Reason  : 1990  MRN: [id number]  DATE: 23    Allergies: Allergies   Allergen Reactions    Morphine Other (See Comments)     Pt states it makes her feel like her body is on fire         ENDOSCOPY  History and Physical    Procedure:    [] Diagnostic Colonoscopy       [] Screening Colonoscopy  [] EGD      [x] ERCP      [] EUS       [] Other    [x] Previous office notes/History and Physical reviewed from the patients chart. Please see EMR for further details of HPI. I have examined the patient's status immediately prior to the procedure and:      Indications/HPI:    []Abdominal Pain   []Barretts  []Screening/Surveillance   []History of Polyps  []Dysphagia            [] +Cologard/DNA testing  []Abnormal Imaging              []EOE Hx              [] Family Hx of CRC/Polyps  []Anemia                            []Food Impaction       []Recent Poor Prep  []GI Bleed             []Lymphadenopathy  []History of Polyps  []Change in bowel habits []Heartburn/Reflux  []Cancer- GI/Lung  []Chest Pain - Non Cardiac []Heme (+) Stool []Ulcers  []Constipation  []Hemoptysis  []Incontinence    []Diarrhea  []Hypoxemia  []Rectal Bleed (BRBPR)  []Nausea/Vomiting   [] Varices  []Crohns/Colitis  []Pancreatic Cyst   [] Cirrhosis   []Pancreatitis    []Abnormal MRCP  []Elevated LFT [x] Stent Removal, Previous ERCP  []Other:     Anesthesia:   [x] MAC [] Moderate Sedation   [] General   [] None     ROS: 12 pt Review of Symptoms was negative unless mentioned above    Medications:   Prior to Admission medications    Medication Sig Start Date End Date Taking?  Authorizing Provider   omeprazole (PRILOSEC) 20 MG delayed release capsule Take 1 capsule by mouth every morning (before breakfast) 23   YINKA Penaloza   hydrOXYzine HCl (ATARAX) 25 MG tablet TAKE 1 TABLET BY MOUTH EVERY NIGHT 23   YINKA Day   ibuprofen (ADVIL;MOTRIN) 800 MG tablet One tablet twice daily with meals as needed

## 2023-04-26 NOTE — DISCHARGE INSTRUCTIONS
RECOMMENDATIONS:    1. Clear liquid diet today with advancing diet tomorrow as tolerated. 2.  Please call with questions/concerns.

## 2023-05-02 ENCOUNTER — E-VISIT (OUTPATIENT)
Dept: INTERNAL MEDICINE | Age: 33
End: 2023-05-02
Payer: MEDICAID

## 2023-05-02 DIAGNOSIS — J01.90 ACUTE SINUSITIS, RECURRENCE NOT SPECIFIED, UNSPECIFIED LOCATION: Primary | ICD-10-CM

## 2023-05-02 PROCEDURE — 99421 OL DIG E/M SVC 5-10 MIN: CPT | Performed by: INTERNAL MEDICINE

## 2023-05-02 RX ORDER — BENZONATATE 200 MG/1
200 CAPSULE ORAL 3 TIMES DAILY PRN
Qty: 30 CAPSULE | Refills: 0 | Status: SHIPPED | OUTPATIENT
Start: 2023-05-02

## 2023-05-02 RX ORDER — AZITHROMYCIN 250 MG/1
250 TABLET, FILM COATED ORAL SEE ADMIN INSTRUCTIONS
Qty: 6 TABLET | Refills: 0 | Status: SHIPPED | OUTPATIENT
Start: 2023-05-02 | End: 2023-05-07

## 2023-05-02 ASSESSMENT — LIFESTYLE VARIABLES
SMOKING_STATUS: NO, I'M A FORMER SMOKER
PACKS_PER_DAY: 7
SMOKING_YEARS: 13

## 2023-05-02 NOTE — PROGRESS NOTES
Patient submitted an E-visit for sinus infection. Tessalon Perles and a Alberto called into pharmacy. Patient advised that if her symptoms get worse to contact her PCP.   About 5 minutes spent on E-visit    Electronically signed by Ambrosio Vargas MD on 5/2/2023 at 12:06 PM

## 2023-05-11 ENCOUNTER — OFFICE VISIT (OUTPATIENT)
Dept: PRIMARY CARE CLINIC | Age: 33
End: 2023-05-11
Payer: MEDICAID

## 2023-05-11 VITALS
BODY MASS INDEX: 44.41 KG/M2 | HEIGHT: 68 IN | HEART RATE: 85 BPM | SYSTOLIC BLOOD PRESSURE: 134 MMHG | WEIGHT: 293 LBS | DIASTOLIC BLOOD PRESSURE: 88 MMHG | OXYGEN SATURATION: 98 % | TEMPERATURE: 96.9 F

## 2023-05-11 DIAGNOSIS — E66.01 CLASS 3 SEVERE OBESITY DUE TO EXCESS CALORIES WITHOUT SERIOUS COMORBIDITY WITH BODY MASS INDEX (BMI) OF 45.0 TO 49.9 IN ADULT (HCC): Primary | ICD-10-CM

## 2023-05-11 DIAGNOSIS — Z12.4 PAP SMEAR FOR CERVICAL CANCER SCREENING: ICD-10-CM

## 2023-05-11 PROCEDURE — 99213 OFFICE O/P EST LOW 20 MIN: CPT | Performed by: NURSE PRACTITIONER

## 2023-05-11 RX ORDER — HYDROXYZINE PAMOATE 25 MG/1
1 CAPSULE ORAL 3 TIMES DAILY
COMMUNITY
Start: 2023-04-26

## 2023-05-11 ASSESSMENT — ENCOUNTER SYMPTOMS
ALLERGIC/IMMUNOLOGIC NEGATIVE: 1
GASTROINTESTINAL NEGATIVE: 1
EYES NEGATIVE: 1
RESPIRATORY NEGATIVE: 1

## 2023-05-11 NOTE — PROGRESS NOTES
Family History   Problem Relation Age of Onset    High Blood Pressure Mother     Heart Disease Father     High Blood Pressure Father     Cancer Paternal Grandmother         pancreatic    Colon Polyps Neg Hx     Colon Cancer Neg Hx     Esophageal Cancer Neg Hx     Liver Cancer Neg Hx     Liver Disease Neg Hx     Rectal Cancer Neg Hx     Stomach Cancer Neg Hx        Social History     Tobacco Use    Smoking status: Former     Packs/day: 0.50     Types: Cigarettes     Quit date: 2022     Years since quittin.4    Smokeless tobacco: Never   Substance Use Topics    Alcohol use: Not Currently     Comment: sober 2022      Current Outpatient Medications on File Prior to Visit   Medication Sig Dispense Refill    hydrOXYzine pamoate (VISTARIL) 25 MG capsule Take 1 capsule by mouth 3 times daily      omeprazole (PRILOSEC) 20 MG delayed release capsule Take 1 capsule by mouth every morning (before breakfast) 90 capsule 1    ibuprofen (ADVIL;MOTRIN) 800 MG tablet One tablet twice daily with meals as needed 40 tablet 0    busPIRone (BUSPAR) 15 MG tablet Take 15 mg by mouth 3 times daily      lamoTRIgine (LAMICTAL) 100 MG tablet Take 1.5 tablets by mouth daily       No current facility-administered medications on file prior to visit.      Allergies   Allergen Reactions    Morphine Other (See Comments)     Pt states it makes her feel like her body is on fire     Azithromycin Rash       Health Maintenance   Topic Date Due    Varicella vaccine (1 of 2 - 2-dose childhood series) Never done    HIV screen  Never done    Hepatitis C screen  Never done    Cervical cancer screen  Never done    COVID-19 Vaccine (1) 2024 (Originally 1990)    Flu vaccine (Season Ended) 2023    Depression Screen  2024    DTaP/Tdap/Td vaccine (3 - Td or Tdap) 2028    Hepatitis A vaccine  Aged Out    Hib vaccine  Aged Out    Meningococcal (ACWY) vaccine  Aged Out    Pneumococcal 0-64 years Vaccine  Aged Out

## 2023-06-04 DIAGNOSIS — G47.00 INSOMNIA, UNSPECIFIED TYPE: ICD-10-CM

## 2023-06-06 RX ORDER — HYDROXYZINE HYDROCHLORIDE 25 MG/1
TABLET, FILM COATED ORAL
Qty: 30 TABLET | Refills: 0 | OUTPATIENT
Start: 2023-06-06

## 2023-06-19 ENCOUNTER — OFFICE VISIT (OUTPATIENT)
Dept: PRIMARY CARE CLINIC | Age: 33
End: 2023-06-19
Payer: MEDICAID

## 2023-06-19 VITALS
DIASTOLIC BLOOD PRESSURE: 72 MMHG | OXYGEN SATURATION: 97 % | HEART RATE: 93 BPM | SYSTOLIC BLOOD PRESSURE: 124 MMHG | WEIGHT: 293 LBS | BODY MASS INDEX: 49.63 KG/M2 | TEMPERATURE: 98.2 F

## 2023-06-19 DIAGNOSIS — Z72.89 CURRENT EVERY DAY VAPING: ICD-10-CM

## 2023-06-19 DIAGNOSIS — E66.01 CLASS 3 SEVERE OBESITY DUE TO EXCESS CALORIES WITHOUT SERIOUS COMORBIDITY WITH BODY MASS INDEX (BMI) OF 45.0 TO 49.9 IN ADULT (HCC): Primary | ICD-10-CM

## 2023-06-19 PROCEDURE — 99214 OFFICE O/P EST MOD 30 MIN: CPT | Performed by: NURSE PRACTITIONER

## 2023-06-19 ASSESSMENT — ENCOUNTER SYMPTOMS
EYES NEGATIVE: 1
ALLERGIC/IMMUNOLOGIC NEGATIVE: 1
RESPIRATORY NEGATIVE: 1
GASTROINTESTINAL NEGATIVE: 1

## 2023-06-19 NOTE — PROGRESS NOTES
[address] [facility]  [address] [address]  [address] [address]  Dept: [phone]  Dept Fax: [phone]  Loc: [phone]    Wiley Bolaños is a 34 y.o. female who presents today for her medical conditions/complaints as noted below. Wiley Bolaños is c/o of Follow-up        HPI:   She presents for follow up on weight. She has lost one pound since last month. Her insurance has denied Rybelsus and Ozempic. She has tried cutting back on portions and certain foods. States she was told by the pharmacy that Sonja Warner would not be covered unless she was a diabetic or pre-diabetic. She recently had a more sedentary job that she is no longer working at. She feels this has something to do with her not being able to lose weight she wanted to. She also mentions that she does not want to try Phentermine only because she is worried about addiction. She states she has been clean and sober for a year and her son is about to return home to her.      POCT A1C  today in office 5.0  HPI   Chief Complaint   Patient presents with    Follow-up     Past Medical History:   Diagnosis Date    Anxiety     GERD (gastroesophageal reflux disease)     Headache     History of alcohol abuse     sober since 2022      Past Surgical History:   Procedure Laterality Date     SECTION      CHOLECYSTECTOMY      CHOLECYSTECTOMY, LAPAROSCOPIC N/A 2023    ROBOTIC ASSISTED LAPAROSCOPIC CHOLECYSTECTOMY WITH ICG performed by Alberto Clements DO at [facility] OR    ERCP N/A 2023    Dr CHRISTINE Quinonez-w/1 cm biliary sphincterotomy, placemetn of a 10F x 7cm plastic biliary stent-Post op bile leak, repeat in 3 months    ERCP N/A 2023    Dr Channing Quinonez-w/stone, sludge, and indwelling biliary stent removal    MYRINGOTOMY W/ TUBES      TUBAL LIGATION         Vitals 2023/9163   SYSTOLIC [phone] -   DIASTOLIC [phone] -   Site Left Upper

## 2023-07-03 ENCOUNTER — OFFICE VISIT (OUTPATIENT)
Dept: PRIMARY CARE CLINIC | Age: 33
End: 2023-07-03
Payer: MEDICAID

## 2023-07-03 VITALS
OXYGEN SATURATION: 97 % | BODY MASS INDEX: 44.41 KG/M2 | TEMPERATURE: 96.9 F | WEIGHT: 293 LBS | HEART RATE: 101 BPM | SYSTOLIC BLOOD PRESSURE: 120 MMHG | DIASTOLIC BLOOD PRESSURE: 70 MMHG | HEIGHT: 68 IN

## 2023-07-03 DIAGNOSIS — J02.0 STREP PHARYNGITIS: Primary | ICD-10-CM

## 2023-07-03 DIAGNOSIS — K04.7 DENTAL ABSCESS: ICD-10-CM

## 2023-07-03 LAB — S PYO AG THROAT QL: POSITIVE

## 2023-07-03 PROCEDURE — 99214 OFFICE O/P EST MOD 30 MIN: CPT | Performed by: NURSE PRACTITIONER

## 2023-07-03 RX ORDER — CEPHALEXIN 500 MG/1
500 CAPSULE ORAL 2 TIMES DAILY
Qty: 20 CAPSULE | Refills: 0 | Status: SHIPPED | OUTPATIENT
Start: 2023-07-03 | End: 2023-07-13

## 2023-07-03 ASSESSMENT — ENCOUNTER SYMPTOMS
VOMITING: 0
SORE THROAT: 1
CHEST TIGHTNESS: 0
ABDOMINAL PAIN: 0
NAUSEA: 0
COUGH: 1
DIARRHEA: 0
SHORTNESS OF BREATH: 0
COLOR CHANGE: 0

## 2023-07-03 NOTE — PROGRESS NOTES
omeprazole (PRILOSEC) 20 MG delayed release capsule Take 1 capsule by mouth every morning (before breakfast) 90 capsule 1    ibuprofen (ADVIL;MOTRIN) 800 MG tablet One tablet twice daily with meals as needed 40 tablet 0    busPIRone (BUSPAR) 15 MG tablet Take 15 mg by mouth 3 times daily      lamoTRIgine (LAMICTAL) 100 MG tablet Take 1.5 tablets by mouth daily      naltrexone-buPROPion (CONTRAVE) 8-90 MG per extended release tablet Take 2 tablets by mouth 2 times daily (Patient not taking: Reported on 7/3/2023) 60 tablet 0     No current facility-administered medications for this visit. Allergies   Allergen Reactions    Morphine Other (See Comments)     Pt states it makes her feel like her body is on fire     Azithromycin Rash       Family History   Problem Relation Age of Onset    High Blood Pressure Mother     Heart Disease Father     High Blood Pressure Father     Cancer Paternal Grandmother         pancreatic    Colon Polyps Neg Hx     Colon Cancer Neg Hx     Esophageal Cancer Neg Hx     Liver Cancer Neg Hx     Liver Disease Neg Hx     Rectal Cancer Neg Hx     Stomach Cancer Neg Hx                Subjective:      Review of Systems   Constitutional:  Negative for activity change and fever. HENT:  Positive for congestion, dental problem and sore throat. Negative for ear pain. Respiratory:  Positive for cough. Negative for chest tightness and shortness of breath. Cardiovascular:  Negative for chest pain. Gastrointestinal:  Negative for abdominal pain, diarrhea, nausea and vomiting. Genitourinary:  Negative for frequency and urgency. Musculoskeletal:  Negative for arthralgias and myalgias. Skin:  Negative for color change. Neurological:  Negative for dizziness, weakness and numbness. Psychiatric/Behavioral:  Negative for agitation. The patient is not nervous/anxious. Objective:     Physical Exam  Vitals reviewed. Constitutional:       Appearance: Normal appearance.    HENT:      Head:

## 2023-07-06 ENCOUNTER — TELEPHONE (OUTPATIENT)
Dept: GASTROENTEROLOGY | Age: 33
End: 2023-07-06

## 2023-07-06 NOTE — TELEPHONE ENCOUNTER
Shyann requests that clinic return her call. The best time to reach her is Anytime. Carolyn Maki states that her symptoms are worsening and is ready to reschedule her procedures. Please contact Shyann to advise. Thank you.

## 2023-07-17 ENCOUNTER — OFFICE VISIT (OUTPATIENT)
Dept: PRIMARY CARE CLINIC | Age: 33
End: 2023-07-17
Payer: MEDICAID

## 2023-07-17 VITALS
WEIGHT: 293 LBS | TEMPERATURE: 98.1 F | DIASTOLIC BLOOD PRESSURE: 88 MMHG | HEART RATE: 78 BPM | OXYGEN SATURATION: 97 % | BODY MASS INDEX: 50.81 KG/M2 | SYSTOLIC BLOOD PRESSURE: 126 MMHG

## 2023-07-17 DIAGNOSIS — K92.1 BLOODY STOOLS: ICD-10-CM

## 2023-07-17 DIAGNOSIS — E66.01 CLASS 3 SEVERE OBESITY DUE TO EXCESS CALORIES WITHOUT SERIOUS COMORBIDITY WITH BODY MASS INDEX (BMI) OF 50.0 TO 59.9 IN ADULT (HCC): Primary | ICD-10-CM

## 2023-07-17 PROCEDURE — 99213 OFFICE O/P EST LOW 20 MIN: CPT | Performed by: NURSE PRACTITIONER

## 2023-07-17 RX ORDER — SEMAGLUTIDE 0.25 MG/.5ML
0.25 INJECTION, SOLUTION SUBCUTANEOUS
Qty: 0.5 ML | Refills: 0 | Status: SHIPPED | OUTPATIENT
Start: 2023-07-17

## 2023-07-17 ASSESSMENT — ENCOUNTER SYMPTOMS
ALLERGIC/IMMUNOLOGIC NEGATIVE: 1
EYES NEGATIVE: 1
RESPIRATORY NEGATIVE: 1
BLOOD IN STOOL: 1

## 2023-07-17 NOTE — PROGRESS NOTES
Reactions    Morphine Other (See Comments)     Pt states it makes her feel like her body is on fire     Azithromycin Rash       Health Maintenance   Topic Date Due    Varicella vaccine (1 of 2 - 2-dose childhood series) Never done    HIV screen  Never done    Hepatitis C screen  Never done    Cervical cancer screen  Never done    COVID-19 Vaccine (1) 01/01/2024 (Originally 1990)    Flu vaccine (1) 08/01/2023    Depression Screen  01/11/2024    DTaP/Tdap/Td vaccine (3 - Td or Tdap) 06/21/2028    Hepatitis A vaccine  Aged Out    Hib vaccine  Aged Out    Meningococcal (ACWY) vaccine  Aged Out    Pneumococcal 0-64 years Vaccine  Aged Out       Subjective   SUBJECTIVE/OBJECTIVE:  @HPI@    Review of Systems   Constitutional:  Positive for unexpected weight change (weight gain). HENT: Negative. Eyes: Negative. Respiratory: Negative. Cardiovascular: Negative. Gastrointestinal:  Positive for blood in stool. Endocrine: Negative. Genitourinary: Negative. Musculoskeletal: Negative. Skin: Negative. Allergic/Immunologic: Negative. Neurological: Negative. Hematological: Negative. Psychiatric/Behavioral: Negative. Objective   Physical Exam  Vitals and nursing note reviewed. Constitutional:       Appearance: Normal appearance. She is obese. HENT:      Head: Normocephalic. Nose: Nose normal.   Cardiovascular:      Rate and Rhythm: Normal rate and regular rhythm. Pulses: Normal pulses. Heart sounds: Normal heart sounds. Pulmonary:      Effort: Pulmonary effort is normal.      Breath sounds: Normal breath sounds. No wheezing or rhonchi. Abdominal:      General: Abdomen is flat. Bowel sounds are normal.      Palpations: Abdomen is soft. Tenderness: There is no abdominal tenderness. Musculoskeletal:         General: Normal range of motion. Cervical back: Normal range of motion. Skin:     General: Skin is warm and dry.    Neurological:      Mental

## 2023-07-28 ENCOUNTER — OFFICE VISIT (OUTPATIENT)
Dept: PRIMARY CARE CLINIC | Age: 33
End: 2023-07-28
Payer: MEDICAID

## 2023-07-28 VITALS
HEIGHT: 68 IN | BODY MASS INDEX: 44.41 KG/M2 | SYSTOLIC BLOOD PRESSURE: 160 MMHG | DIASTOLIC BLOOD PRESSURE: 74 MMHG | HEART RATE: 92 BPM | WEIGHT: 293 LBS | TEMPERATURE: 98.6 F | OXYGEN SATURATION: 91 %

## 2023-07-28 DIAGNOSIS — K52.9 CHRONIC DIARRHEA: ICD-10-CM

## 2023-07-28 DIAGNOSIS — A08.4 VIRAL GASTROENTERITIS: Primary | ICD-10-CM

## 2023-07-28 PROCEDURE — 99214 OFFICE O/P EST MOD 30 MIN: CPT | Performed by: FAMILY MEDICINE

## 2023-07-28 RX ORDER — CHOLESTYRAMINE 4 G/9G
1 POWDER, FOR SUSPENSION ORAL DAILY
Qty: 90 PACKET | Refills: 3 | Status: SHIPPED | OUTPATIENT
Start: 2023-07-28

## 2023-07-28 NOTE — PROGRESS NOTES
Subjective:       HPI:   The patient had to leave work due to having nausea and vomiting. This started yesterday. She is having no fever or chills. She is having no change in abdominal cramping. She has chronic abdominal cramping. This nausea and vomiting have resolved. She has no dizziness. She needs a return to work note. Chronic diarrhea: The patient has chronic diarrhea and abdominal cramping since she had her gallbladder removed in 2023. She has not tried anything to help this.      Past Medical History:   Diagnosis Date    Anxiety     GERD (gastroesophageal reflux disease)     Headache     History of alcohol abuse     sober since 2022     Past Surgical History:   Procedure Laterality Date     SECTION      CHOLECYSTECTOMY      CHOLECYSTECTOMY, LAPAROSCOPIC N/A 2023    ROBOTIC ASSISTED LAPAROSCOPIC CHOLECYSTECTOMY WITH ICG [facility]    ERCP N/A 2023    Dr CHRISTINE Quinonez-w/1 cm biliary sphincterotomy, placemetn of a 10F x 7cm plastic biliary stent-Post op bile leak, repeat in 3 months    ERCP N/A 2023    Dr Mervat Quinonez-w/stone, sludge, and indwelling biliary stent removal    MYRINGOTOMY W/ TUBES      TUBAL LIGATION       Family History   Problem Relation Age of Onset    High Blood Pressure Mother     Heart Disease Father     High Blood Pressure Father     Cancer Paternal Grandmother         pancreatic    Colon Polyps Neg Hx     Colon Cancer Neg Hx     Esophageal Cancer Neg Hx     Liver Cancer Neg Hx     Liver Disease Neg Hx     Rectal Cancer Neg Hx     Stomach Cancer Neg Hx      Social History     Tobacco Use    Smoking status: Former     Packs/day: 0.50     Types: Cigarettes     Quit date: 2022     Years since quittin.6    Smokeless tobacco: Never   Substance Use Topics    Alcohol use: Not Currently     Comment: sober 2022      Current Outpatient Medications   Medication Sig Dispense Refill    cholestyramine (QUESTRAN) 4 g packet

## 2023-07-30 ASSESSMENT — ENCOUNTER SYMPTOMS
COUGH: 0
DIARRHEA: 1
CONSTIPATION: 0
SHORTNESS OF BREATH: 0
TROUBLE SWALLOWING: 0
ABDOMINAL PAIN: 1
NAUSEA: 1
VOMITING: 1
WHEEZING: 0

## 2023-08-01 ENCOUNTER — OFFICE VISIT (OUTPATIENT)
Dept: PRIMARY CARE CLINIC | Age: 33
End: 2023-08-01
Payer: MEDICAID

## 2023-08-01 VITALS
WEIGHT: 293 LBS | TEMPERATURE: 98.2 F | BODY MASS INDEX: 50.91 KG/M2 | DIASTOLIC BLOOD PRESSURE: 82 MMHG | SYSTOLIC BLOOD PRESSURE: 124 MMHG | OXYGEN SATURATION: 99 % | HEART RATE: 89 BPM

## 2023-08-01 DIAGNOSIS — S61.431A PUNCTURE WOUND OF RIGHT HAND WITHOUT FOREIGN BODY, INITIAL ENCOUNTER: Primary | ICD-10-CM

## 2023-08-01 DIAGNOSIS — E66.01 CLASS 3 SEVERE OBESITY DUE TO EXCESS CALORIES WITHOUT SERIOUS COMORBIDITY WITH BODY MASS INDEX (BMI) OF 50.0 TO 59.9 IN ADULT (HCC): ICD-10-CM

## 2023-08-01 PROCEDURE — 99214 OFFICE O/P EST MOD 30 MIN: CPT | Performed by: NURSE PRACTITIONER

## 2023-08-01 RX ORDER — SEMAGLUTIDE 0.25 MG/.5ML
0.25 INJECTION, SOLUTION SUBCUTANEOUS
Qty: 0.5 ML | Refills: 0 | Status: SHIPPED | OUTPATIENT
Start: 2023-08-01

## 2023-08-01 RX ORDER — CEPHALEXIN 500 MG/1
500 CAPSULE ORAL 2 TIMES DAILY
Qty: 20 CAPSULE | Refills: 0 | Status: SHIPPED | OUTPATIENT
Start: 2023-08-01 | End: 2023-08-11

## 2023-08-01 ASSESSMENT — ENCOUNTER SYMPTOMS
RESPIRATORY NEGATIVE: 1
EYES NEGATIVE: 1
GASTROINTESTINAL NEGATIVE: 1
ALLERGIC/IMMUNOLOGIC NEGATIVE: 1

## 2023-08-01 NOTE — PROGRESS NOTES
Azithromycin Rash       Health Maintenance   Topic Date Due    Varicella vaccine (1 of 2 - 2-dose childhood series) Never done    HIV screen  Never done    Hepatitis C screen  Never done    Cervical cancer screen  Never done    Flu vaccine (1) Never done    COVID-19 Vaccine (1) 01/01/2024 (Originally 1990)    Depression Screen  01/11/2024    DTaP/Tdap/Td vaccine (3 - Td or Tdap) 06/21/2028    Hepatitis A vaccine  Aged Out    Hib vaccine  Aged Out    Meningococcal (ACWY) vaccine  Aged Out    Pneumococcal 0-64 years Vaccine  Aged Out       Subjective   SUBJECTIVE/OBJECTIVE:  @HPI@    Review of Systems   Constitutional:  Positive for unexpected weight change. HENT: Negative. Eyes: Negative. Respiratory: Negative. Cardiovascular: Negative. Gastrointestinal: Negative. Endocrine: Negative. Genitourinary: Negative. Musculoskeletal: Negative. Skin:  Positive for wound. Allergic/Immunologic: Negative. Neurological: Negative. Hematological: Negative. Psychiatric/Behavioral: Negative. Objective   Physical Exam  Vitals and nursing note reviewed. Constitutional:       Appearance: Normal appearance. HENT:      Head: Normocephalic. Nose: Nose normal.   Cardiovascular:      Rate and Rhythm: Normal rate and regular rhythm. Pulses: Normal pulses. Heart sounds: Normal heart sounds. Pulmonary:      Effort: Pulmonary effort is normal.      Breath sounds: Normal breath sounds. No wheezing or rhonchi. Abdominal:      General: Abdomen is flat. Bowel sounds are normal.      Palpations: Abdomen is soft. Tenderness: There is no abdominal tenderness. Musculoskeletal:         General: Normal range of motion. Cervical back: Normal range of motion. Skin:     General: Skin is warm and dry. Findings: Wound present. Comments: Three scabbed skin lesions to the palm of her left hand without drainage. Mild erythema and tender noted on palpation.

## 2023-08-08 ENCOUNTER — OFFICE VISIT (OUTPATIENT)
Dept: PRIMARY CARE CLINIC | Age: 33
End: 2023-08-08
Payer: MEDICAID

## 2023-08-08 VITALS
DIASTOLIC BLOOD PRESSURE: 82 MMHG | BODY MASS INDEX: 50.94 KG/M2 | WEIGHT: 293 LBS | HEART RATE: 99 BPM | OXYGEN SATURATION: 98 % | SYSTOLIC BLOOD PRESSURE: 120 MMHG | TEMPERATURE: 98.3 F

## 2023-08-08 DIAGNOSIS — K04.7 DENTAL ABSCESS: ICD-10-CM

## 2023-08-08 DIAGNOSIS — S61.431D: Primary | ICD-10-CM

## 2023-08-08 DIAGNOSIS — E66.01 CLASS 3 SEVERE OBESITY DUE TO EXCESS CALORIES WITHOUT SERIOUS COMORBIDITY WITH BODY MASS INDEX (BMI) OF 50.0 TO 59.9 IN ADULT (HCC): ICD-10-CM

## 2023-08-08 LAB
ALBUMIN SERPL-MCNC: 3.5 G/DL (ref 3.5–5.2)
ALP SERPL-CCNC: 137 U/L (ref 35–104)
ALT SERPL-CCNC: 40 U/L (ref 5–33)
ANION GAP SERPL CALCULATED.3IONS-SCNC: 8 MMOL/L (ref 7–19)
AST SERPL-CCNC: 31 U/L (ref 5–32)
BASOPHILS # BLD: 0.1 K/UL (ref 0–0.2)
BASOPHILS NFR BLD: 0.6 % (ref 0–1)
BILIRUB SERPL-MCNC: <0.2 MG/DL (ref 0.2–1.2)
BUN SERPL-MCNC: 15 MG/DL (ref 6–20)
CALCIUM SERPL-MCNC: 9 MG/DL (ref 8.6–10)
CHLORIDE SERPL-SCNC: 107 MMOL/L (ref 98–111)
CO2 SERPL-SCNC: 24 MMOL/L (ref 22–29)
CREAT SERPL-MCNC: 0.6 MG/DL (ref 0.5–0.9)
EOSINOPHIL # BLD: 0.2 K/UL (ref 0–0.6)
EOSINOPHIL NFR BLD: 1.9 % (ref 0–5)
ERYTHROCYTE [DISTWIDTH] IN BLOOD BY AUTOMATED COUNT: 14.5 % (ref 11.5–14.5)
GLUCOSE SERPL-MCNC: 79 MG/DL (ref 74–109)
HCT VFR BLD AUTO: 40 % (ref 37–47)
HGB BLD-MCNC: 12.4 G/DL (ref 12–16)
IMM GRANULOCYTES # BLD: 0 K/UL
LYMPHOCYTES # BLD: 1.3 K/UL (ref 1.1–4.5)
LYMPHOCYTES NFR BLD: 17.2 % (ref 20–40)
MCH RBC QN AUTO: 27.1 PG (ref 27–31)
MCHC RBC AUTO-ENTMCNC: 31 G/DL (ref 33–37)
MCV RBC AUTO: 87.5 FL (ref 81–99)
MONOCYTES # BLD: 0.9 K/UL (ref 0–0.9)
MONOCYTES NFR BLD: 11.9 % (ref 0–10)
NEUTROPHILS # BLD: 5.3 K/UL (ref 1.5–7.5)
NEUTS SEG NFR BLD: 68.1 % (ref 50–65)
PLATELET # BLD AUTO: 359 K/UL (ref 130–400)
PMV BLD AUTO: 11.5 FL (ref 9.4–12.3)
POTASSIUM SERPL-SCNC: 4.2 MMOL/L (ref 3.5–5)
PROT SERPL-MCNC: 6.5 G/DL (ref 6.6–8.7)
RBC # BLD AUTO: 4.57 M/UL (ref 4.2–5.4)
SODIUM SERPL-SCNC: 139 MMOL/L (ref 136–145)
WBC # BLD AUTO: 7.8 K/UL (ref 4.8–10.8)

## 2023-08-08 PROCEDURE — 99214 OFFICE O/P EST MOD 30 MIN: CPT | Performed by: NURSE PRACTITIONER

## 2023-08-08 RX ORDER — ORLISTAT 120 MG/1
120 CAPSULE ORAL 2 TIMES DAILY WITH MEALS
Qty: 60 CAPSULE | Refills: 0 | Status: SHIPPED | OUTPATIENT
Start: 2023-08-08 | End: 2023-09-07

## 2023-08-08 RX ORDER — AMOXICILLIN 500 MG/1
500 CAPSULE ORAL 2 TIMES DAILY
Qty: 20 CAPSULE | Refills: 0 | Status: SHIPPED | OUTPATIENT
Start: 2023-08-08 | End: 2023-08-18

## 2023-08-08 ASSESSMENT — ENCOUNTER SYMPTOMS
EYES NEGATIVE: 1
RESPIRATORY NEGATIVE: 1
GASTROINTESTINAL NEGATIVE: 1
ALLERGIC/IMMUNOLOGIC NEGATIVE: 1

## 2023-08-08 NOTE — PROGRESS NOTES
Tenderness: There is no abdominal tenderness. Musculoskeletal:         General: Normal range of motion. Cervical back: Normal range of motion. Skin:     General: Skin is warm and dry. Comments: Healing wounds to right hand. No erythema or tenderness noted on exam   Neurological:      Mental Status: She is alert and oriented to person, place, and time. Psychiatric:         Mood and Affect: Mood normal.         Behavior: Behavior normal.          ASSESSMENT/PLAN:  1. Puncture wound of right hand without complication, subsequent encounter  2. Dental abscess  3. Class 3 severe obesity due to excess calories without serious comorbidity with body mass index (BMI) of 50.0 to 59.9 in adult [facility])  -     Comprehensive Metabolic Panel; Future  -     CBC with Auto Differential; Future      Return in about 4 weeks (around 9/5/2023) for follow up with PCP. Work excuse-ok to return to work 8/9/23  Labs today and call with results  Amoxicillin 500mg bid x 10 days  Start Xenical 150mg twice daily with meals for weight loss  Follow up in one month    PDMP Monitoring:    Last PDMP [place] as Reviewed:  Review User Review Instant Review Result            Urine Drug Screenings (1 yr)    No resulted procedures found. Medication Contract and Consent for Opioid Use Documents Filed        No documents found                     Patient given educational materials -see patient instructions. Discussed use, benefit, and side effects of prescribed medications. All patient questions answered. Pt voiced understanding. Reviewed health maintenance. Instructed to continue currentmedications, diet and exercise. Patient agreed with treatment plan. Follow up as directed.    MEDICATIONS:  Orders Placed This Encounter   Medications    amoxicillin (AMOXIL) 500 MG capsule     Sig: Take 1 capsule by mouth 2 times daily for 10 days     Dispense:  20 capsule     Refill:  0    orlistat (XENICAL) 120 MG capsule     Sig: Take 1 capsule

## 2023-08-14 ENCOUNTER — OFFICE VISIT (OUTPATIENT)
Dept: PRIMARY CARE CLINIC | Age: 33
End: 2023-08-14
Payer: MEDICAID

## 2023-08-14 VITALS
SYSTOLIC BLOOD PRESSURE: 132 MMHG | BODY MASS INDEX: 51.91 KG/M2 | HEIGHT: 63 IN | WEIGHT: 293 LBS | DIASTOLIC BLOOD PRESSURE: 80 MMHG | OXYGEN SATURATION: 98 % | HEART RATE: 101 BPM | TEMPERATURE: 98.1 F

## 2023-08-14 DIAGNOSIS — R25.2 HAND CRAMPS: Primary | ICD-10-CM

## 2023-08-14 DIAGNOSIS — M25.641 JOINT STIFFNESS OF HAND, RIGHT: ICD-10-CM

## 2023-08-14 PROCEDURE — 99213 OFFICE O/P EST LOW 20 MIN: CPT | Performed by: NURSE PRACTITIONER

## 2023-08-14 NOTE — PROGRESS NOTES
[facility]  [address]  [place] [address]  Dept: [phone]  Dept Fax: [phone]  Loc: [phone]    Vic Connolly is a 34 y.o. female who presents today for her medical conditions/complaints as noted below. Vic Connolly is c/o of Hand Injury (Pt states she works for [employer] and her Left hand locked up. Pt states is happened on this past Wed night. Pt has forms from her work and she is wanting to be released. )        HPI:   She presents today to have paperwork completed for her employer. She states she returned to work after being off with a hand infection and her right hand cramped up. She states she feels it may be because she as not worked in over a week. She denies any injury. She is eager to go back to work. Potassium level normal 4.2. Reports no hand cramping today. HPI   Chief Complaint   Patient presents with    Hand Injury     Pt states she works for [employer] and her Left hand locked up. Pt states is happened on this past Wed night. Pt has forms from her work and she is wanting to be released.       Past Medical History:   Diagnosis Date    Anxiety     GERD (gastroesophageal reflux disease)     Headache     History of alcohol abuse     sober since 2022      Past Surgical History:   Procedure Laterality Date     SECTION      CHOLECYSTECTOMY      CHOLECYSTECTOMY, LAPAROSCOPIC N/A 2023    ROBOTIC ASSISTED LAPAROSCOPIC CHOLECYSTECTOMY WITH ICG performed by Kimmy Madrigal DO at [facility] OR    ERCP N/A 2023    Dr CHRISTINE Quinonez-w/1 cm biliary sphincterotomy, placemetn of a 10F x 7cm plastic biliary stent-Post op bile leak, repeat in 3 months    ERCP N/A 2023    Dr Melissa Quinonez-w/stone, sludge, and indwelling biliary stent removal    MYRINGOTOMY W/ TUBES      TUBAL LIGATION         Vitals 2023 2023 2023 2023 2023 3//0728   SYSTOLIC [phone]

## 2023-08-16 ENCOUNTER — OFFICE VISIT (OUTPATIENT)
Dept: OBGYN CLINIC | Age: 33
End: 2023-08-16
Payer: MEDICAID

## 2023-08-16 VITALS
DIASTOLIC BLOOD PRESSURE: 77 MMHG | HEART RATE: 92 BPM | WEIGHT: 293 LBS | HEIGHT: 68 IN | BODY MASS INDEX: 44.41 KG/M2 | SYSTOLIC BLOOD PRESSURE: 118 MMHG

## 2023-08-16 DIAGNOSIS — Z01.419 WELL WOMAN EXAM: Primary | ICD-10-CM

## 2023-08-16 DIAGNOSIS — Z11.51 SCREENING FOR HPV (HUMAN PAPILLOMAVIRUS): ICD-10-CM

## 2023-08-16 DIAGNOSIS — Z11.3 SCREEN FOR STD (SEXUALLY TRANSMITTED DISEASE): ICD-10-CM

## 2023-08-16 DIAGNOSIS — Z12.4 SCREENING FOR CERVICAL CANCER: ICD-10-CM

## 2023-08-16 PROCEDURE — 99385 PREV VISIT NEW AGE 18-39: CPT | Performed by: NURSE PRACTITIONER

## 2023-08-16 RX ORDER — CARIPRAZINE 1.5 MG/1
CAPSULE, GELATIN COATED ORAL
COMMUNITY
Start: 2023-08-16

## 2023-08-16 ASSESSMENT — ENCOUNTER SYMPTOMS
GASTROINTESTINAL NEGATIVE: 1
EYES NEGATIVE: 1
RESPIRATORY NEGATIVE: 1
GASTROINTESTINAL NEGATIVE: 1
ALLERGIC/IMMUNOLOGIC NEGATIVE: 1
ALLERGIC/IMMUNOLOGIC NEGATIVE: 1
RESPIRATORY NEGATIVE: 1
EYES NEGATIVE: 1

## 2023-08-17 LAB
C TRACH DNA CVX QL NAA+PROBE: NOT DETECTED
N GONORRHOEA DNA SPEC QL NAA+PROBE: NOT DETECTED
TRICHOMONAS VAGINALIS DNA: DETECTED

## 2023-08-18 RX ORDER — METRONIDAZOLE 500 MG/1
500 TABLET ORAL 2 TIMES DAILY WITH MEALS
Qty: 14 TABLET | Refills: 0 | Status: SHIPPED | OUTPATIENT
Start: 2023-08-18 | End: 2023-08-25

## 2023-08-19 LAB
HPV HR 12 DNA SPEC QL NAA+PROBE: NOT DETECTED
HPV16 DNA SPEC QL NAA+PROBE: NOT DETECTED
HPV16+18+H RISK 12 DNA SPEC-IMP: NORMAL
HPV18 DNA SPEC QL NAA+PROBE: NOT DETECTED

## 2023-10-05 ENCOUNTER — OFFICE VISIT (OUTPATIENT)
Dept: PRIMARY CARE CLINIC | Age: 33
End: 2023-10-05
Payer: MEDICAID

## 2023-10-05 VITALS
HEIGHT: 63 IN | TEMPERATURE: 96.8 F | DIASTOLIC BLOOD PRESSURE: 80 MMHG | HEART RATE: 99 BPM | WEIGHT: 293 LBS | OXYGEN SATURATION: 98 % | BODY MASS INDEX: 51.91 KG/M2 | SYSTOLIC BLOOD PRESSURE: 130 MMHG

## 2023-10-05 DIAGNOSIS — H66.92 LEFT OTITIS MEDIA, UNSPECIFIED OTITIS MEDIA TYPE: Primary | ICD-10-CM

## 2023-10-05 DIAGNOSIS — J06.9 UPPER RESPIRATORY TRACT INFECTION, UNSPECIFIED TYPE: ICD-10-CM

## 2023-10-05 LAB
INFLUENZA A ANTIGEN, POC: NEGATIVE
INFLUENZA B ANTIGEN, POC: NEGATIVE
LOT EXPIRE DATE: NORMAL
LOT KIT NUMBER: NORMAL
SARS-COV-2, POC: NORMAL
VALID INTERNAL CONTROL: NORMAL
VENDOR AND KIT NAME POC: NORMAL

## 2023-10-05 PROCEDURE — 99213 OFFICE O/P EST LOW 20 MIN: CPT | Performed by: NURSE PRACTITIONER

## 2023-10-05 RX ORDER — AMOXICILLIN AND CLAVULANATE POTASSIUM 875; 125 MG/1; MG/1
1 TABLET, FILM COATED ORAL 2 TIMES DAILY
Qty: 20 TABLET | Refills: 0 | Status: SHIPPED | OUTPATIENT
Start: 2023-10-05 | End: 2023-10-15

## 2023-10-05 NOTE — ASSESSMENT & PLAN NOTE
Patient here today with complaints of bilateral otalgia. She states the left ear has been hurting for the past 3 days, and notes that her right began hurting this morning. She denies any associated fever, but notes cough, congestion, and severe headache. Left tympanic membrane is erythematous with cloudy effusion present. The right ear reveals serous effusion without erythema. In office COVID and Flu tests resutled negative. Prescribed oral antibiotics to treat otitis media. Encouraged increased hydration and use of over the counter Tylenol and Ibuprofen.

## 2023-10-12 ASSESSMENT — ENCOUNTER SYMPTOMS
DIARRHEA: 0
SHORTNESS OF BREATH: 0
CHEST TIGHTNESS: 0
NAUSEA: 0
COUGH: 0
SORE THROAT: 0
VOMITING: 0
ABDOMINAL PAIN: 0
COLOR CHANGE: 0

## 2023-10-12 NOTE — PROGRESS NOTES
10/5/2023     Kaye Chávez (:  1990) is a 34 y.o. female,Established patient, here for evaluation of the following chief complaint(s):  Migraine, Otalgia, and Congestion      ASSESSMENT/PLAN:  1. Left otitis media, unspecified otitis media type  Assessment & Plan:   Patient here today with complaints of bilateral otalgia. She states the left ear has been hurting for the past 3 days, and notes that her right began hurting this morning. She denies any associated fever, but notes cough, congestion, and severe headache. Left tympanic membrane is erythematous with cloudy effusion present. The right ear reveals serous effusion without erythema. In office COVID and Flu tests resutled negative. Prescribed oral antibiotics to treat otitis media. Encouraged increased hydration and use of over the counter Tylenol and Ibuprofen. Orders:  -     amoxicillin-clavulanate (AUGMENTIN) 875-125 MG per tablet; Take 1 tablet by mouth 2 times daily for 10 days, Disp-20 tablet, R-0Normal  2. Upper respiratory tract infection, unspecified type  -     POCT COVID-19 & Influenza A/B      Return if symptoms worsen or fail to improve. SUBJECTIVE/OBJECTIVE:  Migraine  Associated symptoms include headaches. Pertinent negatives include no abdominal pain, arthralgias, chest pain, congestion, coughing, fever, myalgias, nausea, numbness, sore throat, vomiting or weakness. Otalgia  Associated symptoms include headaches. Pertinent negatives include no abdominal pain, arthralgias, chest pain, congestion, coughing, fever, myalgias, nausea, numbness, sore throat, vomiting or weakness. Prior to Visit Medications    Medication Sig Taking?  Authorizing Provider   amoxicillin-clavulanate (AUGMENTIN) 875-125 MG per tablet Take 1 tablet by mouth 2 times daily for 10 days Yes Cassidy Wood APRN - CNP   VRAYLAR 1.5 MG capsule  Yes Provider, MD Tammi   hydrOXYzine pamoate (VISTARIL) 25 MG capsule Take 1 capsule by mouth 3 times

## 2023-11-13 ENCOUNTER — OFFICE VISIT (OUTPATIENT)
Dept: PRIMARY CARE CLINIC | Age: 33
End: 2023-11-13

## 2023-11-13 VITALS
BODY MASS INDEX: 59.17 KG/M2 | WEIGHT: 293 LBS | HEART RATE: 97 BPM | DIASTOLIC BLOOD PRESSURE: 78 MMHG | OXYGEN SATURATION: 98 % | SYSTOLIC BLOOD PRESSURE: 132 MMHG | TEMPERATURE: 97.8 F

## 2023-11-13 DIAGNOSIS — Z11.1 VISIT FOR TB SKIN TEST: Primary | ICD-10-CM

## 2023-11-13 ASSESSMENT — ENCOUNTER SYMPTOMS
GASTROINTESTINAL NEGATIVE: 1
RESPIRATORY NEGATIVE: 1
ALLERGIC/IMMUNOLOGIC NEGATIVE: 1
EYES NEGATIVE: 1

## 2023-11-13 NOTE — PROGRESS NOTES
educational materials -see patient instructions. Discussed use, benefit, and side effects of prescribed medications. All patient questions answered. Pt voiced understanding. Reviewed health maintenance. Instructed to continue currentmedications, diet and exercise. Patient agreed with treatment plan. Follow up as directed. MEDICATIONS:  No orders of the defined types were placed in this encounter. ORDERS:  Orders Placed This Encounter   Procedures    Mantoux testing       Follow-up:  Return for keep follow up with PCP. PATIENT INSTRUCTIONS:  There are no Patient Instructions on file for this visit. Electronically signed by YINKA Scales on 11/13/2023 at 11:45 PM    EMR Dragon/transcription disclaimer:  Much of thisencounter note is electronic transcription/translation of spoken language to printed texts. The electronic translation of spoken language may be erroneous, or at times, nonsensical words or phrases may be inadvertentlytranscribed.   Although I have reviewed the note for such errors, some may still exist.

## 2023-11-30 ENCOUNTER — OFFICE VISIT (OUTPATIENT)
Dept: PRIMARY CARE CLINIC | Age: 33
End: 2023-11-30
Payer: MEDICAID

## 2023-11-30 VITALS
HEART RATE: 105 BPM | WEIGHT: 293 LBS | BODY MASS INDEX: 60.94 KG/M2 | TEMPERATURE: 97.8 F | OXYGEN SATURATION: 99 % | DIASTOLIC BLOOD PRESSURE: 72 MMHG | SYSTOLIC BLOOD PRESSURE: 130 MMHG

## 2023-11-30 DIAGNOSIS — R05.1 ACUTE COUGH: ICD-10-CM

## 2023-11-30 DIAGNOSIS — J32.9 SINUSITIS, UNSPECIFIED CHRONICITY, UNSPECIFIED LOCATION: Primary | ICD-10-CM

## 2023-11-30 PROCEDURE — 99213 OFFICE O/P EST LOW 20 MIN: CPT | Performed by: NURSE PRACTITIONER

## 2023-11-30 RX ORDER — PREDNISONE 20 MG/1
20 TABLET ORAL 2 TIMES DAILY
Qty: 10 TABLET | Refills: 0 | Status: SHIPPED | OUTPATIENT
Start: 2023-11-30 | End: 2023-12-05

## 2023-11-30 RX ORDER — FLUTICASONE PROPIONATE 50 MCG
2 SPRAY, SUSPENSION (ML) NASAL DAILY
Qty: 16 G | Refills: 0 | Status: SHIPPED | OUTPATIENT
Start: 2023-11-30

## 2023-11-30 RX ORDER — AMOXICILLIN AND CLAVULANATE POTASSIUM 500; 125 MG/1; MG/1
1 TABLET, FILM COATED ORAL 2 TIMES DAILY
Qty: 20 TABLET | Refills: 0 | Status: SHIPPED | OUTPATIENT
Start: 2023-11-30 | End: 2023-12-10

## 2023-11-30 ASSESSMENT — ENCOUNTER SYMPTOMS
ALLERGIC/IMMUNOLOGIC NEGATIVE: 1
SINUS PAIN: 1
GASTROINTESTINAL NEGATIVE: 1
COUGH: 1
SINUS PRESSURE: 1
EYES NEGATIVE: 1

## 2023-11-30 NOTE — PROGRESS NOTES
[facility] PRIMARY CARE  [facility],[address]  [place] [address]  Dept: [phone]  Dept Fax: [phone]  Loc: [phone]    Virginia Landry is a 34 y.o. female who presents today for her medical conditions/complaints as noted below. Virginia Landry is c/o of Otalgia (\"Both ears achy, right ear more than left. \" ), Cough (Coughing up green mucus ), Facial Pain (Pain behind eyes), and Congestion (Sinus drainage )        HPI:   She presents with complaints of ear pain, coughing up green, pressure behind her eyes and sinus congestion and drainage that started on two days ago. She is not taking any medicine. States she took Ibuprofen a couple of hours ago. She mentions that she takes the Ibuprofen when she feels \"feverish\" but has not taken her temperature. HPI   Chief Complaint   Patient presents with    Otalgia     \"Both ears achy, right ear more than left. \"     Cough     Coughing up green mucus     Facial Pain     Pain behind eyes    Congestion     Sinus drainage      Past Medical History:   Diagnosis Date    Anxiety     GERD (gastroesophageal reflux disease)     Headache     History of alcohol abuse     sober since 2022      Past Surgical History:   Procedure Laterality Date     SECTION      CHOLECYSTECTOMY      CHOLECYSTECTOMY, LAPAROSCOPIC N/A 2023    ROBOTIC ASSISTED LAPAROSCOPIC CHOLECYSTECTOMY WITH ICG performed by Rufina Beyer DO at [facility] OR    ERCP N/A 2023    Dr CHRISTINE Quinonez-w/1 cm biliary sphincterotomy, placemetn of a 10F x 7cm plastic biliary stent-Post op bile leak, repeat in 3 months    ERCP N/A 2023    Dr Rowdy Quinonez-w/stone, sludge, and indwelling biliary stent removal    MYRINGOTOMY W/ TUBES      TUBAL LIGATION             2023    10:54 AM 2023     8:37 AM 10/5/2023     8:48 AM 2023     2:58 PM 2023    10:27 AM 2023     9:02 AM   Vitals   SYSTOLIC [phone]   DIASTOLIC 72 [phone]

## 2023-12-04 RX ORDER — FLUTICASONE PROPIONATE 50 MCG
2 SPRAY, SUSPENSION (ML) NASAL DAILY
Qty: 48 G | OUTPATIENT
Start: 2023-12-04

## 2023-12-09 ENCOUNTER — HOSPITAL ENCOUNTER (EMERGENCY)
Age: 33
Discharge: HOME | End: 2023-12-10
Payer: MEDICAID

## 2023-12-09 ENCOUNTER — APPOINTMENT (OUTPATIENT)
Dept: CT IMAGING | Age: 33
End: 2023-12-09
Payer: MEDICAID

## 2023-12-09 VITALS
SYSTOLIC BLOOD PRESSURE: 115 MMHG | TEMPERATURE: 98.2 F | DIASTOLIC BLOOD PRESSURE: 69 MMHG | OXYGEN SATURATION: 95 % | BODY MASS INDEX: 60.92 KG/M2 | RESPIRATION RATE: 17 BRPM | WEIGHT: 293 LBS | HEART RATE: 94 BPM

## 2023-12-09 DIAGNOSIS — K52.9 COLITIS: Primary | ICD-10-CM

## 2023-12-09 LAB
ALBUMIN SERPL-MCNC: 3.3 G/DL (ref 3.5–5.2)
ALP SERPL-CCNC: 148 U/L (ref 35–104)
ALT SERPL-CCNC: 43 U/L (ref 5–33)
ANION GAP SERPL CALCULATED.3IONS-SCNC: 8 MMOL/L (ref 7–19)
AST SERPL-CCNC: 27 U/L (ref 5–32)
BASOPHILS # BLD: 0.1 K/UL (ref 0–0.2)
BASOPHILS NFR BLD: 0.3 % (ref 0–1)
BILIRUB SERPL-MCNC: <0.2 MG/DL (ref 0.2–1.2)
BUN SERPL-MCNC: 17 MG/DL (ref 6–20)
CALCIUM SERPL-MCNC: 9.1 MG/DL (ref 8.6–10)
CHLORIDE SERPL-SCNC: 106 MMOL/L (ref 98–111)
CO2 SERPL-SCNC: 26 MMOL/L (ref 22–29)
CREAT SERPL-MCNC: 0.6 MG/DL (ref 0.5–0.9)
EOSINOPHIL # BLD: 0.1 K/UL (ref 0–0.6)
EOSINOPHIL NFR BLD: 0.2 % (ref 0–5)
ERYTHROCYTE [DISTWIDTH] IN BLOOD BY AUTOMATED COUNT: 15 % (ref 11.5–14.5)
GLUCOSE SERPL-MCNC: 95 MG/DL (ref 74–109)
HCG SERPL QL: NEGATIVE
HCT VFR BLD AUTO: 42.6 % (ref 37–47)
HGB BLD-MCNC: 13.4 G/DL (ref 12–16)
IMM GRANULOCYTES # BLD: 0.2 K/UL
LACTATE BLDV-SCNC: 0.9 MMOL/L (ref 0.5–1.9)
LIPASE SERPL-CCNC: 18 U/L (ref 13–60)
LYMPHOCYTES # BLD: 3.6 K/UL (ref 1.1–4.5)
LYMPHOCYTES NFR BLD: 17.9 % (ref 20–40)
MCH RBC QN AUTO: 27 PG (ref 27–31)
MCHC RBC AUTO-ENTMCNC: 31.5 G/DL (ref 33–37)
MCV RBC AUTO: 85.7 FL (ref 81–99)
MONOCYTES # BLD: 1.4 K/UL (ref 0–0.9)
MONOCYTES NFR BLD: 6.7 % (ref 0–10)
NEUTROPHILS # BLD: 15 K/UL (ref 1.5–7.5)
NEUTS SEG NFR BLD: 74 % (ref 50–65)
PLATELET # BLD AUTO: 433 K/UL (ref 130–400)
PMV BLD AUTO: 10.6 FL (ref 9.4–12.3)
POTASSIUM SERPL-SCNC: 4.4 MMOL/L (ref 3.5–5)
PROT SERPL-MCNC: 6.3 G/DL (ref 6.6–8.7)
RBC # BLD AUTO: 4.97 M/UL (ref 4.2–5.4)
SODIUM SERPL-SCNC: 140 MMOL/L (ref 136–145)
WBC # BLD AUTO: 20.3 K/UL (ref 4.8–10.8)

## 2023-12-09 PROCEDURE — 85025 COMPLETE CBC W/AUTO DIFF WBC: CPT

## 2023-12-09 PROCEDURE — 84703 CHORIONIC GONADOTROPIN ASSAY: CPT

## 2023-12-09 PROCEDURE — 96375 TX/PRO/DX INJ NEW DRUG ADDON: CPT

## 2023-12-09 PROCEDURE — 80053 COMPREHEN METABOLIC PANEL: CPT

## 2023-12-09 PROCEDURE — 6360000002 HC RX W HCPCS: Performed by: PHYSICIAN ASSISTANT

## 2023-12-09 PROCEDURE — 2580000003 HC RX 258: Performed by: PHYSICIAN ASSISTANT

## 2023-12-09 PROCEDURE — 6360000004 HC RX CONTRAST MEDICATION: Performed by: PHYSICIAN ASSISTANT

## 2023-12-09 PROCEDURE — 96372 THER/PROPH/DIAG INJ SC/IM: CPT

## 2023-12-09 PROCEDURE — 74177 CT ABD & PELVIS W/CONTRAST: CPT

## 2023-12-09 PROCEDURE — 99285 EMERGENCY DEPT VISIT HI MDM: CPT

## 2023-12-09 PROCEDURE — 87040 BLOOD CULTURE FOR BACTERIA: CPT

## 2023-12-09 PROCEDURE — 36415 COLL VENOUS BLD VENIPUNCTURE: CPT

## 2023-12-09 PROCEDURE — 83690 ASSAY OF LIPASE: CPT

## 2023-12-09 PROCEDURE — 96365 THER/PROPH/DIAG IV INF INIT: CPT

## 2023-12-09 PROCEDURE — 83605 ASSAY OF LACTIC ACID: CPT

## 2023-12-09 RX ORDER — CIPROFLOXACIN 2 MG/ML
400 INJECTION, SOLUTION INTRAVENOUS ONCE
Status: COMPLETED | OUTPATIENT
Start: 2023-12-09 | End: 2023-12-09

## 2023-12-09 RX ORDER — CIPROFLOXACIN 500 MG/1
500 TABLET, FILM COATED ORAL 2 TIMES DAILY
Qty: 20 TABLET | Refills: 0 | Status: SHIPPED | OUTPATIENT
Start: 2023-12-09 | End: 2023-12-19

## 2023-12-09 RX ORDER — 0.9 % SODIUM CHLORIDE 0.9 %
1000 INTRAVENOUS SOLUTION INTRAVENOUS ONCE
Status: COMPLETED | OUTPATIENT
Start: 2023-12-09 | End: 2023-12-09

## 2023-12-09 RX ORDER — FENTANYL CITRATE 50 UG/ML
50 INJECTION, SOLUTION INTRAMUSCULAR; INTRAVENOUS ONCE
Status: COMPLETED | OUTPATIENT
Start: 2023-12-09 | End: 2023-12-09

## 2023-12-09 RX ORDER — METRONIDAZOLE 500 MG/100ML
500 INJECTION, SOLUTION INTRAVENOUS ONCE
Status: COMPLETED | OUTPATIENT
Start: 2023-12-09 | End: 2023-12-10

## 2023-12-09 RX ORDER — METRONIDAZOLE 500 MG/1
500 TABLET ORAL 3 TIMES DAILY
Qty: 30 TABLET | Refills: 0 | Status: SHIPPED | OUTPATIENT
Start: 2023-12-09 | End: 2023-12-19

## 2023-12-09 RX ORDER — DICYCLOMINE HYDROCHLORIDE 10 MG/ML
20 INJECTION INTRAMUSCULAR ONCE
Status: COMPLETED | OUTPATIENT
Start: 2023-12-09 | End: 2023-12-09

## 2023-12-09 RX ORDER — METOCLOPRAMIDE HYDROCHLORIDE 5 MG/ML
10 INJECTION INTRAMUSCULAR; INTRAVENOUS ONCE
Status: COMPLETED | OUTPATIENT
Start: 2023-12-09 | End: 2023-12-09

## 2023-12-09 RX ADMIN — FENTANYL CITRATE 50 MCG: 50 INJECTION INTRAMUSCULAR; INTRAVENOUS at 22:02

## 2023-12-09 RX ADMIN — METOCLOPRAMIDE 10 MG: 5 INJECTION, SOLUTION INTRAMUSCULAR; INTRAVENOUS at 22:02

## 2023-12-09 RX ADMIN — CIPROFLOXACIN 400 MG: 2 INJECTION, SOLUTION INTRAVENOUS at 22:05

## 2023-12-09 RX ADMIN — SODIUM CHLORIDE 1000 ML: 9 INJECTION, SOLUTION INTRAVENOUS at 19:03

## 2023-12-09 RX ADMIN — METRONIDAZOLE 500 MG: 500 INJECTION, SOLUTION INTRAVENOUS at 23:04

## 2023-12-09 RX ADMIN — DICYCLOMINE HYDROCHLORIDE 20 MG: 10 INJECTION, SOLUTION INTRAMUSCULAR at 19:06

## 2023-12-09 RX ADMIN — IOPAMIDOL 70 ML: 755 INJECTION, SOLUTION INTRAVENOUS at 19:30

## 2023-12-09 ASSESSMENT — ENCOUNTER SYMPTOMS
ABDOMINAL DISTENTION: 0
COUGH: 0
PHOTOPHOBIA: 0
SHORTNESS OF BREATH: 0
EYE DISCHARGE: 0
SORE THROAT: 0
EYE PAIN: 0
APNEA: 0
ABDOMINAL PAIN: 1
BLOOD IN STOOL: 1
NAUSEA: 0
BACK PAIN: 0
COLOR CHANGE: 0
RHINORRHEA: 0

## 2023-12-09 ASSESSMENT — PAIN SCALES - GENERAL
PAINLEVEL_OUTOF10: 8
PAINLEVEL_OUTOF10: 7

## 2023-12-09 ASSESSMENT — PAIN - FUNCTIONAL ASSESSMENT: PAIN_FUNCTIONAL_ASSESSMENT: 0-10

## 2023-12-09 ASSESSMENT — PAIN DESCRIPTION - LOCATION: LOCATION: ABDOMEN

## 2023-12-11 ENCOUNTER — OFFICE VISIT (OUTPATIENT)
Dept: PRIMARY CARE CLINIC | Age: 33
End: 2023-12-11
Payer: MEDICAID

## 2023-12-11 VITALS
HEART RATE: 106 BPM | WEIGHT: 293 LBS | DIASTOLIC BLOOD PRESSURE: 84 MMHG | SYSTOLIC BLOOD PRESSURE: 122 MMHG | OXYGEN SATURATION: 97 % | TEMPERATURE: 97.5 F | BODY MASS INDEX: 60.97 KG/M2

## 2023-12-11 DIAGNOSIS — K52.9 COLITIS: Primary | ICD-10-CM

## 2023-12-11 DIAGNOSIS — Z52.008 PLASMA DONOR: ICD-10-CM

## 2023-12-11 PROCEDURE — 99213 OFFICE O/P EST LOW 20 MIN: CPT | Performed by: NURSE PRACTITIONER

## 2023-12-13 ASSESSMENT — ENCOUNTER SYMPTOMS
RESPIRATORY NEGATIVE: 1
ALLERGIC/IMMUNOLOGIC NEGATIVE: 1
GASTROINTESTINAL NEGATIVE: 1
EYES NEGATIVE: 1

## 2023-12-14 LAB
BACTERIA BLD CULT ORG #2: NORMAL
BACTERIA BLD CULT: NORMAL

## 2023-12-31 ENCOUNTER — APPOINTMENT (OUTPATIENT)
Dept: CT IMAGING | Age: 33
End: 2023-12-31
Payer: MEDICAID

## 2023-12-31 ENCOUNTER — HOSPITAL ENCOUNTER (EMERGENCY)
Age: 33
Discharge: HOME | End: 2023-12-31
Attending: EMERGENCY MEDICINE
Payer: MEDICAID

## 2023-12-31 VITALS
RESPIRATION RATE: 16 BRPM | SYSTOLIC BLOOD PRESSURE: 138 MMHG | HEART RATE: 115 BPM | WEIGHT: 293 LBS | OXYGEN SATURATION: 95 % | DIASTOLIC BLOOD PRESSURE: 99 MMHG | TEMPERATURE: 98.1 F | HEIGHT: 68 IN | BODY MASS INDEX: 44.41 KG/M2

## 2023-12-31 DIAGNOSIS — K92.2 LOWER GI BLEED: Primary | ICD-10-CM

## 2023-12-31 LAB
ABO/RH: NORMAL
ALBUMIN SERPL-MCNC: 3.6 G/DL (ref 3.5–5.2)
ALP SERPL-CCNC: 110 U/L (ref 35–104)
ALT SERPL-CCNC: 25 U/L (ref 5–33)
ANION GAP SERPL CALCULATED.3IONS-SCNC: 9 MMOL/L (ref 7–19)
ANTIBODY SCREEN: NORMAL
AST SERPL-CCNC: 18 U/L (ref 5–32)
BASOPHILS # BLD: 0.1 K/UL (ref 0–0.2)
BASOPHILS NFR BLD: 0.7 % (ref 0–1)
BILIRUB SERPL-MCNC: <0.2 MG/DL (ref 0.2–1.2)
BUN SERPL-MCNC: 13 MG/DL (ref 6–20)
CALCIUM SERPL-MCNC: 8.8 MG/DL (ref 8.6–10)
CHLORIDE SERPL-SCNC: 106 MMOL/L (ref 98–111)
CO2 SERPL-SCNC: 23 MMOL/L (ref 22–29)
CREAT SERPL-MCNC: 0.7 MG/DL (ref 0.5–0.9)
EOSINOPHIL # BLD: 0.1 K/UL (ref 0–0.6)
EOSINOPHIL NFR BLD: 1 % (ref 0–5)
ERYTHROCYTE [DISTWIDTH] IN BLOOD BY AUTOMATED COUNT: 14.7 % (ref 11.5–14.5)
GLUCOSE SERPL-MCNC: 93 MG/DL (ref 74–109)
HCT VFR BLD AUTO: 42.7 % (ref 37–47)
HGB BLD-MCNC: 13.6 G/DL (ref 12–16)
IMM GRANULOCYTES # BLD: 0.1 K/UL
LYMPHOCYTES # BLD: 1.6 K/UL (ref 1.1–4.5)
LYMPHOCYTES NFR BLD: 12 % (ref 20–40)
MCH RBC QN AUTO: 27.5 PG (ref 27–31)
MCHC RBC AUTO-ENTMCNC: 31.9 G/DL (ref 33–37)
MCV RBC AUTO: 86.3 FL (ref 81–99)
MONOCYTES # BLD: 1.1 K/UL (ref 0–0.9)
MONOCYTES NFR BLD: 8.7 % (ref 0–10)
NEUTROPHILS # BLD: 10 K/UL (ref 1.5–7.5)
NEUTS SEG NFR BLD: 76.8 % (ref 50–65)
PLATELET # BLD AUTO: 263 K/UL (ref 130–400)
PLATELET SLIDE REVIEW: ADEQUATE
PMV BLD AUTO: 11.3 FL (ref 9.4–12.3)
POTASSIUM SERPL-SCNC: 3.8 MMOL/L (ref 3.5–5)
PROT SERPL-MCNC: 6.6 G/DL (ref 6.6–8.7)
RBC # BLD AUTO: 4.95 M/UL (ref 4.2–5.4)
REASON FOR REJECTION: NORMAL
REJECTED TEST: NORMAL
SODIUM SERPL-SCNC: 138 MMOL/L (ref 136–145)
WBC # BLD AUTO: 13 K/UL (ref 4.8–10.8)

## 2023-12-31 PROCEDURE — 86901 BLOOD TYPING SEROLOGIC RH(D): CPT

## 2023-12-31 PROCEDURE — 86850 RBC ANTIBODY SCREEN: CPT

## 2023-12-31 PROCEDURE — 99285 EMERGENCY DEPT VISIT HI MDM: CPT

## 2023-12-31 PROCEDURE — 6360000004 HC RX CONTRAST MEDICATION: Performed by: EMERGENCY MEDICINE

## 2023-12-31 PROCEDURE — 80053 COMPREHEN METABOLIC PANEL: CPT

## 2023-12-31 PROCEDURE — 6360000002 HC RX W HCPCS: Performed by: EMERGENCY MEDICINE

## 2023-12-31 PROCEDURE — 74177 CT ABD & PELVIS W/CONTRAST: CPT

## 2023-12-31 PROCEDURE — 96374 THER/PROPH/DIAG INJ IV PUSH: CPT

## 2023-12-31 PROCEDURE — 36415 COLL VENOUS BLD VENIPUNCTURE: CPT

## 2023-12-31 PROCEDURE — 85025 COMPLETE CBC W/AUTO DIFF WBC: CPT

## 2023-12-31 PROCEDURE — 93005 ELECTROCARDIOGRAM TRACING: CPT | Performed by: EMERGENCY MEDICINE

## 2023-12-31 PROCEDURE — 2580000003 HC RX 258: Performed by: EMERGENCY MEDICINE

## 2023-12-31 PROCEDURE — 6360000002 HC RX W HCPCS

## 2023-12-31 PROCEDURE — 86900 BLOOD TYPING SEROLOGIC ABO: CPT

## 2023-12-31 PROCEDURE — 96375 TX/PRO/DX INJ NEW DRUG ADDON: CPT

## 2023-12-31 RX ORDER — SODIUM CHLORIDE, SODIUM LACTATE, POTASSIUM CHLORIDE, AND CALCIUM CHLORIDE .6; .31; .03; .02 G/100ML; G/100ML; G/100ML; G/100ML
1000 INJECTION, SOLUTION INTRAVENOUS ONCE
Status: COMPLETED | OUTPATIENT
Start: 2023-12-31 | End: 2023-12-31

## 2023-12-31 RX ORDER — ONDANSETRON 2 MG/ML
4 INJECTION INTRAMUSCULAR; INTRAVENOUS ONCE
Status: COMPLETED | OUTPATIENT
Start: 2023-12-31 | End: 2023-12-31

## 2023-12-31 RX ORDER — HYDROMORPHONE HYDROCHLORIDE 1 MG/ML
1 INJECTION, SOLUTION INTRAMUSCULAR; INTRAVENOUS; SUBCUTANEOUS ONCE
Status: COMPLETED | OUTPATIENT
Start: 2023-12-31 | End: 2023-12-31

## 2023-12-31 RX ADMIN — HYDROMORPHONE HYDROCHLORIDE 1 MG: 1 INJECTION, SOLUTION INTRAMUSCULAR; INTRAVENOUS; SUBCUTANEOUS at 06:21

## 2023-12-31 RX ADMIN — SODIUM CHLORIDE, POTASSIUM CHLORIDE, SODIUM LACTATE AND CALCIUM CHLORIDE 1000 ML: 600; 310; 30; 20 INJECTION, SOLUTION INTRAVENOUS at 06:24

## 2023-12-31 RX ADMIN — ONDANSETRON 4 MG: 2 INJECTION INTRAMUSCULAR; INTRAVENOUS at 06:21

## 2023-12-31 RX ADMIN — IOPAMIDOL 70 ML: 755 INJECTION, SOLUTION INTRAVENOUS at 06:40

## 2023-12-31 ASSESSMENT — PAIN SCALES - GENERAL: PAINLEVEL_OUTOF10: 9

## 2023-12-31 NOTE — ED PROVIDER NOTES
[facility] EMERGENCY DEPT  eMERGENCY dEPARTMENT eNCOUnter      Pt Name: Shyann Hardwick  MRN: [id number]  Birthdate 1990  Date of evaluation: 2023  Provider: Abhi العراقي MD    CHIEF COMPLAINT       Chief Complaint   Patient presents with    Rectal Bleeding         HISTORY OF PRESENT ILLNESS   (Location/Symptom, Timing/Onset,Context/Setting, Quality, Duration, Modifying Factors, Severity)  Note limiting factors.   Shyann Hardwick is a 33 y.o. female who presents to the emergency department for evaluation regarding bright red rectal bleeding.  Patient states that for about the past 2 days she has been having ongoing episodes of lower quadrant abdominal pain.  States that this evening she had several episodes of diarrhea along with some bright red blood identified in the stool.  She does have a recent history of colitis about 1 month previously.  She has not seen GI or underwent previous colonoscopy.  Denies fevers or chills.  She does feel somewhat lightheaded.  Not currently maintained on any oral anticoagulant medications.    HPI    NursingNotes were reviewed.    REVIEW OF SYSTEMS    (2-9 systems for level 4, 10 or more for level 5)     Review of Systems   Constitutional:  Negative for chills and fever.   Respiratory:  Negative for shortness of breath.    Cardiovascular:  Negative for chest pain and palpitations.   Gastrointestinal:  Positive for abdominal pain and blood in stool. Negative for vomiting.   Neurological:  Positive for dizziness and syncope.   All other systems reviewed and are negative.           PAST MEDICALHISTORY     Past Medical History:   Diagnosis Date    Anxiety     GERD (gastroesophageal reflux disease)     Headache     History of alcohol abuse     sober since 2022         SURGICAL HISTORY       Past Surgical History:   Procedure Laterality Date     SECTION      CHOLECYSTECTOMY      CHOLECYSTECTOMY, LAPAROSCOPIC N/A 2023    ROBOTIC ASSISTED LAPAROSCOPIC CHOLECYSTECTOMY WITH

## 2024-01-02 LAB
EKG P AXIS: 38 DEGREES
EKG P-R INTERVAL: 144 MS
EKG Q-T INTERVAL: 294 MS
EKG QRS DURATION: 70 MS
EKG QTC CALCULATION (BAZETT): 390 MS
EKG T AXIS: 11 DEGREES

## 2024-01-02 PROCEDURE — 93010 ELECTROCARDIOGRAM REPORT: CPT | Performed by: INTERNAL MEDICINE

## 2024-01-04 ENCOUNTER — OFFICE VISIT (OUTPATIENT)
Age: 34
End: 2024-01-04
Payer: MEDICAID

## 2024-01-04 VITALS
WEIGHT: 293 LBS | BODY MASS INDEX: 44.41 KG/M2 | SYSTOLIC BLOOD PRESSURE: 116 MMHG | HEART RATE: 110 BPM | DIASTOLIC BLOOD PRESSURE: 66 MMHG | TEMPERATURE: 97.2 F | OXYGEN SATURATION: 95 % | HEIGHT: 68 IN | RESPIRATION RATE: 24 BRPM

## 2024-01-04 DIAGNOSIS — J02.0 STREP THROAT: Primary | ICD-10-CM

## 2024-01-04 DIAGNOSIS — R05.9 COUGH, UNSPECIFIED TYPE: ICD-10-CM

## 2024-01-04 LAB
INFLUENZA A ANTIBODY: NORMAL
INFLUENZA B ANTIBODY: NORMAL
S PYO AG THROAT QL: POSITIVE

## 2024-01-04 PROCEDURE — 99203 OFFICE O/P NEW LOW 30 MIN: CPT

## 2024-01-04 RX ORDER — AMOXICILLIN 875 MG/1
875 TABLET, COATED ORAL 2 TIMES DAILY
Qty: 20 TABLET | Refills: 0 | Status: SHIPPED | OUTPATIENT
Start: 2024-01-04 | End: 2024-01-14

## 2024-01-04 ASSESSMENT — ENCOUNTER SYMPTOMS
DIARRHEA: 0
VOMITING: 1
SHORTNESS OF BREATH: 1
COUGH: 1
SINUS PAIN: 0
SINUS PRESSURE: 0
SORE THROAT: 1
RHINORRHEA: 0
NAUSEA: 1

## 2024-01-04 NOTE — PATIENT INSTRUCTIONS
-Take full course of antibiotics  -Monitor for fever and treat as needed with tylenol or ibuprofen  -Recommended throat lozenges as needed and salt water gargles three times daily  -Replace toothbrush in 24-48 hours after antibiotics are started  -The patient is to follow up with PCP or return to clinic if symptoms worsen/fail to improve.

## 2024-01-04 NOTE — PROGRESS NOTES
[facility]  [facility]  [address] [place] [address] DRIVE  [place] [address]  Dept: [phone]  Dept Fax: [phone]  Loc: [phone]    Shyann Hardwick is a 33 y.o. female who presents today for her medical conditions/complaints as noted below.  Shyann Hardwick is complaining of Cough, Nausea & Vomiting, Headache, Fever, and Chills (Started a week ago)        HPI:   Pt reports she just got out of the hospital 1 week ago.  Pt reports she has been unable to keep anything down x 1 week.    Cough  This is a new problem. The current episode started in the past 7 days. The problem has been waxing and waning. The problem occurs every few minutes. The cough is Non-productive. Associated symptoms include chills, a fever, headaches, a sore throat and shortness of breath. Pertinent negatives include no ear pain or rhinorrhea.   Nausea & Vomiting  This is a new problem. The current episode started in the past 7 days. The problem occurs intermittently. The problem has been waxing and waning. Associated symptoms include chills, coughing, a fever, headaches, nausea, a sore throat and vomiting. Pertinent negatives include no congestion. The symptoms are aggravated by drinking and eating. She has tried nothing for the symptoms.       Past Medical History:   Diagnosis Date    Anxiety     GERD (gastroesophageal reflux disease)     Headache     History of alcohol abuse     sober since 2022       Past Surgical History:   Procedure Laterality Date     SECTION      CHOLECYSTECTOMY      CHOLECYSTECTOMY, LAPAROSCOPIC N/A 2023    ROBOTIC ASSISTED LAPAROSCOPIC CHOLECYSTECTOMY WITH ICG performed by Genevieve Molina DO at [facility] OR    ERCP N/A 2023    Dr CHRISTINE Quinonez-keshawn/1 cm biliary sphincterotomy, placemetn of a 10F x 7cm plastic biliary stent-Post op bile leak, repeat in 3 months    ERCP N/A 2023    Dr CHRISTINE Luna/stone, sludge, and indwelling biliary stent removal    MYRINGOTOMY

## 2024-03-07 ENCOUNTER — OFFICE VISIT (OUTPATIENT)
Dept: PRIMARY CARE CLINIC | Age: 34
End: 2024-03-07
Payer: MEDICAID

## 2024-03-07 VITALS
WEIGHT: 293 LBS | DIASTOLIC BLOOD PRESSURE: 84 MMHG | HEART RATE: 115 BPM | TEMPERATURE: 97.5 F | BODY MASS INDEX: 49.93 KG/M2 | SYSTOLIC BLOOD PRESSURE: 118 MMHG | OXYGEN SATURATION: 99 %

## 2024-03-07 DIAGNOSIS — Z13.29 SCREENING FOR THYROID DISORDER: ICD-10-CM

## 2024-03-07 DIAGNOSIS — K21.9 GASTROESOPHAGEAL REFLUX DISEASE WITHOUT ESOPHAGITIS: ICD-10-CM

## 2024-03-07 DIAGNOSIS — Z13.220 SCREENING, LIPID: ICD-10-CM

## 2024-03-07 DIAGNOSIS — Z00.00 PHYSICAL EXAM: Primary | ICD-10-CM

## 2024-03-07 DIAGNOSIS — E66.01 CLASS 3 SEVERE OBESITY DUE TO EXCESS CALORIES WITHOUT SERIOUS COMORBIDITY WITH BODY MASS INDEX (BMI) OF 45.0 TO 49.9 IN ADULT (HCC): ICD-10-CM

## 2024-03-07 PROCEDURE — 99395 PREV VISIT EST AGE 18-39: CPT | Performed by: NURSE PRACTITIONER

## 2024-03-07 RX ORDER — OMEPRAZOLE 20 MG/1
20 CAPSULE, DELAYED RELEASE ORAL
Qty: 90 CAPSULE | Refills: 1 | Status: SHIPPED | OUTPATIENT
Start: 2024-03-07

## 2024-03-07 SDOH — ECONOMIC STABILITY: FOOD INSECURITY: WITHIN THE PAST 12 MONTHS, YOU WORRIED THAT YOUR FOOD WOULD RUN OUT BEFORE YOU GOT MONEY TO BUY MORE.: NEVER TRUE

## 2024-03-07 SDOH — ECONOMIC STABILITY: INCOME INSECURITY: HOW HARD IS IT FOR YOU TO PAY FOR THE VERY BASICS LIKE FOOD, HOUSING, MEDICAL CARE, AND HEATING?: NOT VERY HARD

## 2024-03-07 SDOH — ECONOMIC STABILITY: FOOD INSECURITY: WITHIN THE PAST 12 MONTHS, THE FOOD YOU BOUGHT JUST DIDN'T LAST AND YOU DIDN'T HAVE MONEY TO GET MORE.: NEVER TRUE

## 2024-03-07 SDOH — ECONOMIC STABILITY: HOUSING INSECURITY
IN THE LAST 12 MONTHS, WAS THERE A TIME WHEN YOU DID NOT HAVE A STEADY PLACE TO SLEEP OR SLEPT IN A SHELTER (INCLUDING NOW)?: NO

## 2024-03-07 ASSESSMENT — PATIENT HEALTH QUESTIONNAIRE - PHQ9
1. LITTLE INTEREST OR PLEASURE IN DOING THINGS: 0
SUM OF ALL RESPONSES TO PHQ9 QUESTIONS 1 & 2: 0
SUM OF ALL RESPONSES TO PHQ QUESTIONS 1-9: 0
SUM OF ALL RESPONSES TO PHQ QUESTIONS 1-9: 0
2. FEELING DOWN, DEPRESSED OR HOPELESS: 0
SUM OF ALL RESPONSES TO PHQ QUESTIONS 1-9: 0
SUM OF ALL RESPONSES TO PHQ QUESTIONS 1-9: 0

## 2024-03-07 NOTE — PROGRESS NOTES
Aged Out    Pneumococcal 0-64 years Vaccine  Aged Out       Subjective   SUBJECTIVE/OBJECTIVE:  @HPI@    Review of Systems   Constitutional: Negative.    HENT: Negative.     Eyes: Negative.    Respiratory: Negative.     Cardiovascular: Negative.    Gastrointestinal: Negative.         Acid reflux     Endocrine: Negative.    Genitourinary: Negative.    Musculoskeletal: Negative.    Skin: Negative.    Allergic/Immunologic: Negative.    Neurological: Negative.    Hematological: Negative.    Psychiatric/Behavioral: Negative.            Objective   Physical Exam  Vitals and nursing note reviewed.   Constitutional:       Appearance: Normal appearance. She is obese.   HENT:      Head: Normocephalic.      Nose: Nose normal.   Cardiovascular:      Rate and Rhythm: Normal rate and regular rhythm.      Pulses: Normal pulses.      Heart sounds: Normal heart sounds.   Pulmonary:      Effort: Pulmonary effort is normal.      Breath sounds: Normal breath sounds. No wheezing or rhonchi.   Abdominal:      General: Abdomen is flat. Bowel sounds are normal.      Palpations: Abdomen is soft.      Tenderness: There is no abdominal tenderness.   Musculoskeletal:         General: Normal range of motion.      Cervical back: Normal range of motion.   Skin:     General: Skin is warm and dry.   Neurological:      Mental Status: She is alert and oriented to person, place, and time.   Psychiatric:         Mood and Affect: Mood normal.         Behavior: Behavior normal.            ASSESSMENT/PLAN:  1. Physical exam  -     Comprehensive Metabolic Panel; Future  -     CBC with Auto Differential; Future  -     TSH; Future  -     Lipid, Fasting; Future  2. Gastroesophageal reflux disease without esophagitis  3. Screening for thyroid disorder  -     TSH; Future  4. Screening, lipid  -     Lipid, Fasting; Future  5. Class 3 severe obesity due to excess calories without serious comorbidity with body mass index (BMI) of 45.0 to 49.9 in adult

## 2024-03-11 ASSESSMENT — ENCOUNTER SYMPTOMS
GASTROINTESTINAL NEGATIVE: 1
EYES NEGATIVE: 1
RESPIRATORY NEGATIVE: 1
ALLERGIC/IMMUNOLOGIC NEGATIVE: 1

## 2024-03-27 ENCOUNTER — HOSPITAL ENCOUNTER (EMERGENCY)
Age: 34
Discharge: HOME | End: 2024-03-27
Attending: EMERGENCY MEDICINE
Payer: MEDICAID

## 2024-03-27 VITALS
HEIGHT: 68 IN | DIASTOLIC BLOOD PRESSURE: 67 MMHG | TEMPERATURE: 97.8 F | HEART RATE: 94 BPM | SYSTOLIC BLOOD PRESSURE: 110 MMHG | OXYGEN SATURATION: 97 % | RESPIRATION RATE: 18 BRPM | WEIGHT: 293 LBS | BODY MASS INDEX: 44.41 KG/M2

## 2024-03-27 DIAGNOSIS — R19.7 BLOODY DIARRHEA: Primary | ICD-10-CM

## 2024-03-27 LAB
ALBUMIN SERPL-MCNC: 3.4 G/DL (ref 3.5–5.2)
ALP SERPL-CCNC: 110 U/L (ref 35–104)
ALT SERPL-CCNC: 28 U/L (ref 5–33)
ANION GAP SERPL CALCULATED.3IONS-SCNC: 6 MMOL/L (ref 7–19)
AST SERPL-CCNC: 20 U/L (ref 5–32)
BASOPHILS # BLD: 0.1 K/UL (ref 0–0.2)
BASOPHILS NFR BLD: 0.6 % (ref 0–1)
BILIRUB SERPL-MCNC: <0.2 MG/DL (ref 0.2–1.2)
BILIRUB UR QL STRIP: NEGATIVE
BUN SERPL-MCNC: 11 MG/DL (ref 6–20)
CALCIUM SERPL-MCNC: 8.9 MG/DL (ref 8.6–10)
CHLORIDE SERPL-SCNC: 104 MMOL/L (ref 98–111)
CLARITY UR: CLEAR
CO2 SERPL-SCNC: 28 MMOL/L (ref 22–29)
COLOR UR: YELLOW
CREAT SERPL-MCNC: 0.6 MG/DL (ref 0.5–0.9)
EOSINOPHIL # BLD: 0.3 K/UL (ref 0–0.6)
EOSINOPHIL NFR BLD: 2.7 % (ref 0–5)
ERYTHROCYTE [DISTWIDTH] IN BLOOD BY AUTOMATED COUNT: 15.1 % (ref 11.5–14.5)
GLUCOSE SERPL-MCNC: 100 MG/DL (ref 74–109)
GLUCOSE UR STRIP.AUTO-MCNC: NEGATIVE MG/DL
HCG SERPL QL: NEGATIVE
HCT VFR BLD AUTO: 43.9 % (ref 37–47)
HGB BLD-MCNC: 13.8 G/DL (ref 12–16)
HGB UR STRIP.AUTO-MCNC: NEGATIVE MG/L
IMM GRANULOCYTES # BLD: 0 K/UL
KETONES UR STRIP.AUTO-MCNC: NEGATIVE MG/DL
LEUKOCYTE ESTERASE UR QL STRIP.AUTO: NEGATIVE
LIPASE SERPL-CCNC: 28 U/L (ref 13–60)
LYMPHOCYTES # BLD: 3.3 K/UL (ref 1.1–4.5)
LYMPHOCYTES NFR BLD: 33.1 % (ref 20–40)
MCH RBC QN AUTO: 27 PG (ref 27–31)
MCHC RBC AUTO-ENTMCNC: 31.4 G/DL (ref 33–37)
MCV RBC AUTO: 85.9 FL (ref 81–99)
MONOCYTES # BLD: 0.8 K/UL (ref 0–0.9)
MONOCYTES NFR BLD: 7.8 % (ref 0–10)
NEUTROPHILS # BLD: 5.5 K/UL (ref 1.5–7.5)
NEUTS SEG NFR BLD: 55.4 % (ref 50–65)
NITRITE UR QL STRIP.AUTO: NEGATIVE
PH UR STRIP.AUTO: 6.5 [PH] (ref 5–8)
PLATELET # BLD AUTO: 419 K/UL (ref 130–400)
PMV BLD AUTO: 11.2 FL (ref 9.4–12.3)
POTASSIUM SERPL-SCNC: 4.2 MMOL/L (ref 3.5–5)
PROT SERPL-MCNC: 6.1 G/DL (ref 6.6–8.7)
PROT UR STRIP.AUTO-MCNC: NEGATIVE MG/DL
RBC # BLD AUTO: 5.11 M/UL (ref 4.2–5.4)
REASON FOR REJECTION: NORMAL
REJECTED TEST: NORMAL
SODIUM SERPL-SCNC: 138 MMOL/L (ref 136–145)
SP GR UR STRIP.AUTO: 1.01 (ref 1–1.03)
UROBILINOGEN UR STRIP.AUTO-MCNC: 0.2 E.U./DL
WBC # BLD AUTO: 9.9 K/UL (ref 4.8–10.8)

## 2024-03-27 PROCEDURE — 96374 THER/PROPH/DIAG INJ IV PUSH: CPT

## 2024-03-27 PROCEDURE — 80053 COMPREHEN METABOLIC PANEL: CPT

## 2024-03-27 PROCEDURE — 84703 CHORIONIC GONADOTROPIN ASSAY: CPT

## 2024-03-27 PROCEDURE — 96368 THER/DIAG CONCURRENT INF: CPT

## 2024-03-27 PROCEDURE — 99284 EMERGENCY DEPT VISIT MOD MDM: CPT

## 2024-03-27 PROCEDURE — 81003 URINALYSIS AUTO W/O SCOPE: CPT

## 2024-03-27 PROCEDURE — 85025 COMPLETE CBC W/AUTO DIFF WBC: CPT

## 2024-03-27 PROCEDURE — 6360000002 HC RX W HCPCS: Performed by: EMERGENCY MEDICINE

## 2024-03-27 PROCEDURE — 96365 THER/PROPH/DIAG IV INF INIT: CPT

## 2024-03-27 PROCEDURE — 2580000003 HC RX 258: Performed by: EMERGENCY MEDICINE

## 2024-03-27 PROCEDURE — 83690 ASSAY OF LIPASE: CPT

## 2024-03-27 PROCEDURE — C9113 INJ PANTOPRAZOLE SODIUM, VIA: HCPCS | Performed by: EMERGENCY MEDICINE

## 2024-03-27 PROCEDURE — 96375 TX/PRO/DX INJ NEW DRUG ADDON: CPT

## 2024-03-27 PROCEDURE — 36415 COLL VENOUS BLD VENIPUNCTURE: CPT

## 2024-03-27 RX ORDER — METRONIDAZOLE 500 MG/100ML
500 INJECTION, SOLUTION INTRAVENOUS ONCE
Status: COMPLETED | OUTPATIENT
Start: 2024-03-27 | End: 2024-03-27

## 2024-03-27 RX ORDER — CIPROFLOXACIN 500 MG/1
500 TABLET, FILM COATED ORAL 2 TIMES DAILY
Qty: 10 TABLET | Refills: 0 | Status: SHIPPED | OUTPATIENT
Start: 2024-03-27 | End: 2024-04-01

## 2024-03-27 RX ORDER — METOCLOPRAMIDE HYDROCHLORIDE 5 MG/ML
10 INJECTION INTRAMUSCULAR; INTRAVENOUS ONCE
Status: COMPLETED | OUTPATIENT
Start: 2024-03-27 | End: 2024-03-27

## 2024-03-27 RX ORDER — HYDROCODONE BITARTRATE AND ACETAMINOPHEN 5; 325 MG/1; MG/1
1 TABLET ORAL ONCE
Status: DISCONTINUED | OUTPATIENT
Start: 2024-03-27 | End: 2024-03-27 | Stop reason: HOSPADM

## 2024-03-27 RX ORDER — CIPROFLOXACIN 2 MG/ML
400 INJECTION, SOLUTION INTRAVENOUS ONCE
Status: COMPLETED | OUTPATIENT
Start: 2024-03-27 | End: 2024-03-27

## 2024-03-27 RX ORDER — METRONIDAZOLE 500 MG/1
500 TABLET ORAL 3 TIMES DAILY
Qty: 15 TABLET | Refills: 0 | Status: SHIPPED | OUTPATIENT
Start: 2024-03-27 | End: 2024-04-01

## 2024-03-27 RX ORDER — SODIUM CHLORIDE, SODIUM LACTATE, POTASSIUM CHLORIDE, AND CALCIUM CHLORIDE .6; .31; .03; .02 G/100ML; G/100ML; G/100ML; G/100ML
1000 INJECTION, SOLUTION INTRAVENOUS ONCE
Status: COMPLETED | OUTPATIENT
Start: 2024-03-27 | End: 2024-03-27

## 2024-03-27 RX ADMIN — METRONIDAZOLE 500 MG: 500 INJECTION, SOLUTION INTRAVENOUS at 14:11

## 2024-03-27 RX ADMIN — SODIUM CHLORIDE, POTASSIUM CHLORIDE, SODIUM LACTATE AND CALCIUM CHLORIDE 1000 ML: 600; 310; 30; 20 INJECTION, SOLUTION INTRAVENOUS at 14:18

## 2024-03-27 RX ADMIN — METOCLOPRAMIDE 10 MG: 5 INJECTION, SOLUTION INTRAMUSCULAR; INTRAVENOUS at 14:11

## 2024-03-27 RX ADMIN — CIPROFLOXACIN 400 MG: 400 INJECTION, SOLUTION INTRAVENOUS at 14:10

## 2024-03-27 RX ADMIN — SODIUM CHLORIDE, PRESERVATIVE FREE 40 MG: 5 INJECTION INTRAVENOUS at 15:15

## 2024-03-27 ASSESSMENT — ENCOUNTER SYMPTOMS
EYES NEGATIVE: 1
ABDOMINAL PAIN: 1
BLOOD IN STOOL: 1
RESPIRATORY NEGATIVE: 1
DIARRHEA: 1

## 2024-03-27 NOTE — ED PROVIDER NOTES
[facility] EMERGENCY DEPT  EMERGENCY DEPARTMENT ENCOUNTER      Pt Name: Shyann Hardwick  MRN: [id number]  Birthdate 1990  Date of evaluation: 3/27/2024  Provider: Larry Rodriguez Jr, MD    CHIEF COMPLAINT       Chief Complaint   Patient presents with    Abdominal Pain     With bloating since this AM    Rectal Bleeding     BRB starting this AM, h/o colitis         HISTORY OF PRESENT ILLNESS   (Location/Symptom, Timing/Onset,Context/Setting, Quality, Duration, Modifying Factors, Severity)  Note limiting factors.   Shyann Hardwick is a 33 y.o. female who presents to the emergency department for evaluation after having abdominal bloating, pain, and bloody diarrhea recently.  Says that she been having diarrhea for the last week and a half but then today started having bloody stools along with the pain.  Has had several episodes of this recently and has been diagnosed and treated for colitis.  Says the episodes have improved with antibiotics.  She had a referral to GI and was scheduled for follow-up with him and had plans for a colonoscopy but had to cancel that and has not rescheduled due to issues with insurance coverage.  Says she should be able to see them after this month.  Has not had any prior colonoscopies.  Has a history of cholecystectomy in the past and had an ERCP around that time    Pain is primarily in the suprapubic abdomen radiating up the right side into the epigastric region    HPI    NursingNotes were reviewed.    REVIEW OF SYSTEMS    (2-9 systems for level 4, 10 or more for level 5)     Review of Systems   Constitutional: Negative.    HENT: Negative.     Eyes: Negative.    Respiratory: Negative.     Cardiovascular: Negative.    Gastrointestinal:  Positive for abdominal pain, blood in stool and diarrhea.   Genitourinary: Negative.    Musculoskeletal: Negative.    Skin: Negative.    Neurological: Negative.    Hematological: Negative.    Psychiatric/Behavioral: Negative.         A complete review of systems was

## 2024-05-06 ENCOUNTER — HOSPITAL ENCOUNTER (EMERGENCY)
Age: 34
Discharge: HOME | End: 2024-05-06
Payer: MEDICAID

## 2024-05-06 ENCOUNTER — APPOINTMENT (OUTPATIENT)
Dept: GENERAL RADIOLOGY | Age: 34
End: 2024-05-06
Payer: MEDICAID

## 2024-05-06 VITALS
TEMPERATURE: 97.1 F | OXYGEN SATURATION: 98 % | WEIGHT: 293 LBS | DIASTOLIC BLOOD PRESSURE: 101 MMHG | SYSTOLIC BLOOD PRESSURE: 131 MMHG | HEART RATE: 95 BPM | HEIGHT: 68 IN | BODY MASS INDEX: 44.41 KG/M2 | RESPIRATION RATE: 14 BRPM

## 2024-05-06 DIAGNOSIS — S93.402A SPRAIN OF LEFT ANKLE, UNSPECIFIED LIGAMENT, INITIAL ENCOUNTER: Primary | ICD-10-CM

## 2024-05-06 PROCEDURE — 73630 X-RAY EXAM OF FOOT: CPT

## 2024-05-06 PROCEDURE — 73610 X-RAY EXAM OF ANKLE: CPT

## 2024-05-06 PROCEDURE — 6370000000 HC RX 637 (ALT 250 FOR IP): Performed by: NURSE PRACTITIONER

## 2024-05-06 PROCEDURE — 99283 EMERGENCY DEPT VISIT LOW MDM: CPT

## 2024-05-06 RX ORDER — ONDANSETRON 4 MG/1
4 TABLET, ORALLY DISINTEGRATING ORAL ONCE
Status: COMPLETED | OUTPATIENT
Start: 2024-05-06 | End: 2024-05-06

## 2024-05-06 RX ORDER — OXYCODONE AND ACETAMINOPHEN 10; 325 MG/1; MG/1
1 TABLET ORAL ONCE
Status: COMPLETED | OUTPATIENT
Start: 2024-05-06 | End: 2024-05-06

## 2024-05-06 RX ORDER — HYDROCODONE BITARTRATE AND ACETAMINOPHEN 5; 325 MG/1; MG/1
1 TABLET ORAL EVERY 6 HOURS PRN
Qty: 12 TABLET | Refills: 0 | Status: SHIPPED | OUTPATIENT
Start: 2024-05-06 | End: 2024-05-09

## 2024-05-06 RX ADMIN — ONDANSETRON 4 MG: 4 TABLET, ORALLY DISINTEGRATING ORAL at 18:54

## 2024-05-06 RX ADMIN — OXYCODONE HYDROCHLORIDE AND ACETAMINOPHEN 1 TABLET: 10; 325 TABLET ORAL at 18:54

## 2024-05-06 ASSESSMENT — ENCOUNTER SYMPTOMS: RESPIRATORY NEGATIVE: 1

## 2024-05-06 ASSESSMENT — PAIN DESCRIPTION - LOCATION: LOCATION: ANKLE

## 2024-05-06 ASSESSMENT — PAIN SCALES - GENERAL: PAINLEVEL_OUTOF10: 9

## 2024-05-06 ASSESSMENT — PAIN DESCRIPTION - ORIENTATION: ORIENTATION: LEFT

## 2024-05-06 NOTE — ED PROVIDER NOTES
[facility] EMERGENCY DEPT  EMERGENCY DEPARTMENT ENCOUNTER      Pt Name: Shyann Hardwick  MRN: [id number]  Birthdate 1990  Date of evaluation: 2024  Provider: YINKA Schneider NP    CHIEF COMPLAINT     No chief complaint on file.        HISTORY OF PRESENT ILLNESS   (Location/Symptom, Timing/Onset,Context/Setting, Quality, Duration, Modifying Factors, Severity)  Note limiting factors.   Shyann Hardwick is a 33 y.o. female who presents to the emergency department with complaint of left lateral ankle pain.  She reports stepping down from a camper 2 days ago and inverting her left ankle.  Since that time, there has been increased swelling and now bruising.  She reports painful ambulation.        The history is provided by the patient.       NursingNotes were reviewed.    REVIEW OF SYSTEMS    (2-9 systems for level 4, 10 or more for level 5)     Review of Systems   Constitutional:  Positive for activity change.   Respiratory: Negative.     Cardiovascular: Negative.    Musculoskeletal:  Positive for arthralgias, joint swelling and myalgias.   Neurological:  Negative for numbness.   All other systems reviewed and are negative.      A complete review of systems was performed and is negative except as noted above in the HPI.       PAST MEDICAL HISTORY     Past Medical History:   Diagnosis Date    Anxiety     GERD (gastroesophageal reflux disease)     Headache     History of alcohol abuse     sober since 2022         SURGICAL HISTORY       Past Surgical History:   Procedure Laterality Date     SECTION      CHOLECYSTECTOMY      CHOLECYSTECTOMY, LAPAROSCOPIC N/A 2023    ROBOTIC ASSISTED LAPAROSCOPIC CHOLECYSTECTOMY WITH ICG performed by Genevieve Molina DO at [facility] OR    ERCP N/A 2023    Dr CHRISTINE Quinonez-keshawn/1 cm biliary sphincterotomy, placemetn of a 10F x 7cm plastic biliary stent-Post op bile leak, repeat in 3 months    ERCP N/A 2023    Dr CHRISTINE Quinonez-keshawn/stone, sludge, and indwelling biliary stent removal

## 2024-05-07 NOTE — DISCHARGE INSTRUCTIONS
Wear the boot for at least two weeks.   If no improvement, follow up with your MD or Ortho.  Keep elevated as much as possible.  Ice 3-4 times daily for 20 minutes each time.  Return to ER for any new, worsening, or change in condition.

## 2024-07-25 ENCOUNTER — OFFICE VISIT (OUTPATIENT)
Dept: PRIMARY CARE CLINIC | Age: 34
End: 2024-07-25
Payer: MEDICAID

## 2024-07-25 VITALS
WEIGHT: 293 LBS | OXYGEN SATURATION: 99 % | SYSTOLIC BLOOD PRESSURE: 132 MMHG | TEMPERATURE: 97.7 F | BODY MASS INDEX: 50.18 KG/M2 | HEART RATE: 97 BPM | DIASTOLIC BLOOD PRESSURE: 88 MMHG

## 2024-07-25 DIAGNOSIS — R51.9 ACUTE NONINTRACTABLE HEADACHE, UNSPECIFIED HEADACHE TYPE: ICD-10-CM

## 2024-07-25 DIAGNOSIS — A08.4 VIRAL GASTROENTERITIS: Primary | ICD-10-CM

## 2024-07-25 DIAGNOSIS — Z20.822 EXPOSURE TO COVID-19 VIRUS: ICD-10-CM

## 2024-07-25 PROCEDURE — 87428 SARSCOV & INF VIR A&B AG IA: CPT | Performed by: NURSE PRACTITIONER

## 2024-07-25 PROCEDURE — 99213 OFFICE O/P EST LOW 20 MIN: CPT | Performed by: NURSE PRACTITIONER

## 2024-07-25 RX ORDER — OMEPRAZOLE 20 MG/1
20 CAPSULE, DELAYED RELEASE ORAL
Qty: 90 CAPSULE | Refills: 1 | Status: SHIPPED | OUTPATIENT
Start: 2024-07-25

## 2024-07-25 RX ORDER — ONDANSETRON 4 MG/1
4 TABLET, FILM COATED ORAL EVERY 8 HOURS PRN
Qty: 6 TABLET | Refills: 0 | Status: SHIPPED | OUTPATIENT
Start: 2024-07-25 | End: 2024-07-27

## 2024-07-25 NOTE — PROGRESS NOTES
[facility]  [address]  [address]  [place] [address]  Dept: [phone]  Dept Fax: [phone]  Loc: [phone]    Shyann Hardwick is a 34 y.o. female who presents today for her medical conditions/complaints as noted below.  Shyann Hardwick is c/o of Headache, Nausea & Vomiting, and Diarrhea        HPI:   She presents today with complaints of headache, nausea, vomiting and diarrhea that started yesterday.  She states three of her co-workers have COVID.  She works in medical field. She has been taking Pepto Bismol. She reports \"at least 10 times\" earlier today.      HPI   Chief Complaint   Patient presents with    Headache    Nausea & Vomiting    Diarrhea     Past Medical History:   Diagnosis Date    Anxiety     GERD (gastroesophageal reflux disease)     Headache     History of alcohol abuse     sober since 2022      Past Surgical History:   Procedure Laterality Date     SECTION      CHOLECYSTECTOMY      CHOLECYSTECTOMY, LAPAROSCOPIC N/A 2023    ROBOTIC ASSISTED LAPAROSCOPIC CHOLECYSTECTOMY WITH ICG performed by Genevieve Molina DO at [facility] OR    ERCP N/A 2023    Dr CHRISTINE Quinonez-w/1 cm biliary sphincterotomy, placemetn of a 10F x 7cm plastic biliary stent-Post op bile leak, repeat in 3 months    ERCP N/A 2023    Dr CHRISTINE Quinonez-w/stone, sludge, and indwelling biliary stent removal    MYRINGOTOMY W/ TUBES      TUBAL LIGATION             2024     2:39 PM 2024     6:07 PM 2024     5:01 PM 3/27/2024     2:01 PM 3/27/2024     1:25 PM 3/27/2024    12:57 PM   Vitals   SYSTOLIC 132  131 110 137 150   DIASTOLIC 88  101 67 84 78   Pulse 97  95   94   Temp 97.7 °F (36.5 °C)  97.1 °F (36.2 °C)   97.8 °F (36.6 °C)   Respirations   14   18   SpO2 99 %  98 %   97 %   Weight - Scale 330 lb 330 lb 330 lb   328 lb   Height  5' 8\"    5' 8\"   Body Mass Index  50.18 kg/m2    49.87 kg/m2   Pain Level  9           Family History   Problem Relation Age of

## 2024-09-09 ENCOUNTER — TELEPHONE (OUTPATIENT)
Dept: PRIMARY CARE CLINIC | Age: 34
End: 2024-09-09

## (undated) DEVICE — SUTURE V-LOC 180 SZ 3-0 L6IN ABSRB GRN V-20 L26MM 1/2 CIR VLOCL0604

## (undated) DEVICE — SPHINCTEROTOME: Brand: DREAMTOME™ RX 44

## (undated) DEVICE — TISSUE RETRIEVAL SYSTEM: Brand: INZII RETRIEVAL SYSTEM

## (undated) DEVICE — GOWN,PREVENTION PLUS,XLN/2XL,ST,22/CS: Brand: MEDLINE

## (undated) DEVICE — GENERAL LAP CDS

## (undated) DEVICE — SOLUTION ANTIFOG VIS SYS CLEARIFY LAPSCP

## (undated) DEVICE — ANESTHESIA CIRCUIT ADULT-LF: Brand: MEDLINE INDUSTRIES, INC.

## (undated) DEVICE — SUTURE MCRYL SZ 4-0 L18IN ABSRB UD L19MM PS-2 3/8 CIR PRIM Y496G

## (undated) DEVICE — GLOVE SURG SZ 7 CRM LTX FREE POLYISOPRENE POLYMER BEAD ANTI

## (undated) DEVICE — ENDO KIT,LOURDES HOSPITAL: Brand: MEDLINE INDUSTRIES, INC.

## (undated) DEVICE — AGENT HEMSTAT 3GM PURIFIED PLNT STARCH PWD ABSRB ARISTA AH

## (undated) DEVICE — SUTURE VCRL SZ 0 L27IN ABSRB VLT L36MM UR-5 5/8 CIR J376H

## (undated) DEVICE — MAX-A-L MAX ADULT LONG PROBE: Brand: MEDLINE

## (undated) DEVICE — Device

## (undated) DEVICE — COVER LT HNDL BLU PLAS

## (undated) DEVICE — ARM DRAPE

## (undated) DEVICE — COVER,MAYO STAND,STERILE: Brand: MEDLINE

## (undated) DEVICE — ADHESIVE SKIN CLSR 0.7ML TOP DERMBND ADV

## (undated) DEVICE — SNARE ENDOSCP L240CM OD24MM LOOP W10MM RND INSUL STIFF BRAID

## (undated) DEVICE — TUBE ET 7MM NSL ORAL BASIC CUF INTMED MURPHY EYE RADPQ MRK

## (undated) DEVICE — MASK VENTILATOR MED AD SUPERNOVA ET

## (undated) DEVICE — CONTRAST IOTHALAMATE MEGLUMINE 60% 50 ML INJ CONRAY 60

## (undated) DEVICE — GOWN,PREVENTION PLUS,XL,ST,24/CS: Brand: MEDLINE

## (undated) DEVICE — SYSTEM BX CAP BILI RAP EXCHG CAP LOK DEV COMPATIBLE W/ OLY

## (undated) DEVICE — DRAIN SURG 15FR 100% SIL RND END PERF

## (undated) DEVICE — SUCTION IRRIGATOR: Brand: ENDOWRIST

## (undated) DEVICE — SUTURE ETHLN SZ 2-0 L30IN NONABSORBABLE BLK L36MM FSLX 3/8 1674H

## (undated) DEVICE — LARYNGOSCOPE VID MILLER 2 MTL BLADE M HNDL CURAPLEX

## (undated) DEVICE — SINGLE USE DISTAL COVER MAJ-2315: Brand: SINGLE USE DISTAL COVER

## (undated) DEVICE — RETRIEVAL BALLOON CATHETER: Brand: EXTRACTOR™ PRO RX

## (undated) DEVICE — BLADELESS OBTURATOR: Brand: WECK VISTA

## (undated) DEVICE — RESERVOIR,SUCTION,100CC,SILICONE: Brand: MEDLINE

## (undated) DEVICE — CANNULA SEAL